# Patient Record
Sex: FEMALE | Race: WHITE | NOT HISPANIC OR LATINO | Employment: OTHER | URBAN - METROPOLITAN AREA
[De-identification: names, ages, dates, MRNs, and addresses within clinical notes are randomized per-mention and may not be internally consistent; named-entity substitution may affect disease eponyms.]

---

## 2023-06-14 ENCOUNTER — HOSPITAL ENCOUNTER (OUTPATIENT)
Dept: DATA CONVERSION | Facility: HOSPITAL | Age: 83
End: 2023-06-14
Attending: ANESTHESIOLOGY | Admitting: ANESTHESIOLOGY
Payer: MEDICARE

## 2023-06-14 DIAGNOSIS — G89.29 OTHER CHRONIC PAIN: ICD-10-CM

## 2023-06-14 DIAGNOSIS — M53.3 SACROCOCCYGEAL DISORDERS, NOT ELSEWHERE CLASSIFIED: ICD-10-CM

## 2023-06-14 DIAGNOSIS — M54.16 RADICULOPATHY, LUMBAR REGION: ICD-10-CM

## 2023-09-07 VITALS — BODY MASS INDEX: 23.59 KG/M2 | HEIGHT: 60 IN | WEIGHT: 120.15 LBS

## 2024-05-21 NOTE — PROGRESS NOTES
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  Patient has no incontinence no saddle paresthesia no paralysis no weakness in her lower extremities.  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 The patient is not interested in any more extensive procedure like Vertiflex or surgery since the injections are helping her. We will plan on bilateral L5-S1 interlaminar LORRAINE soon          Last procedure:   6/14/2023 right L5-S1 interlaminar LORRAINE patient has had 75% improvement in pain and function  9/6/2022 left L5-S1 interlaminar LORRAINE the patient has had a 60% improvement in pain and function  12/13/2021 bilateral L3-5 MBB the patient has had a 30% improvement in pain 30% functional improvement but only for few weeks and did not work until 2 weeks later.  Patient had some back injection at the [facility] in the past        Portions of record reviewed for pertinent issues:  active problem list, medication list, allergies, family history, social history, notes from last encounter, encounters, lab results, imaging and other system records.        I have personally reviewed the OARRS report for this patient. This report is scanned into the electronic medical record. I have considered the risks of abuse, dependence, addiction and diversion. It showed: No chronic controlled substance  OPIOID RISK ASSESSMENT SCORE 0/26  Aberrant behavior: None     Pending law suits: None retired teacher,   Social History: Lives with  who does the cooking, as 4 adult children, 11 grandchildren and 4 great-grandchildren, finished college BA                    Radiological studies:  5/27/2022 bone density done showed normal T score of the lumbar spine with -1.5 in the neck of the femur     5/9/2022 MRI of the lumbar spine showed severe stenosis at the L4-5 may be a candidate for Vertiflex procedure:  FINDINGS:  This report assumes 5 non-rib bearing lumbar vertebral bodies. The  lowest intervertebral disc will be labeled L5-S1.     There is dextrocurvature. There is straightening of the lumbar spine.  There is grade 1 anterolisthesis at L4-L5. Vertebral body heights are  maintained. There is marked intervertebral disc height narrowing at  L2-L3 and L5-S1 and mild to moderate intervertebral disc height  narrowing at T12-L1, L1-L2, L3-L4, and L4-L5 with chronic  degenerative endplate changes. There is slight STIR hyperintense  signal located within some of the endplates, most pronounced at  T12-L1 and L1-L2 which may reflect degenerative osseous edema versus  reactive hypervascularity. Marrow signal is overall within normal  limits.     The conus medullaris terminates at the level of L1-L2 and is  unremarkable in appearance.     At T12-L1, there is a small diffuse disc bulge with osteophytic  spurring and there is a right paracentral disc protrusion with  inferior migration which partially effaces the  ventral thecal sac.  There is no spinal canal stenosis. There is no neural foraminal  narrowing or facet osteoarthropathy.     At L1-L2, there is a small diffuse disc bulge, slightly eccentric to  the right with slight superior migration which along with ligamentum  flavum thickening causes mild-to-moderate spinal canal stenosis.  There is no striking neural foraminal narrowing or facet  osteoarthropathy.     At L2-L3, there are small posterior osteophytes which partially  effaces the ventral thecal sac and along with flavum thickening  causes overall mild-to-moderate spinal canal stenosis, more  pronounced on the left side. There is extension of the disc and  osteophyte into the bilateral neural foramina and there is resulting  mild bilateral neural foraminal narrowing. There is no striking facet  osteoarthropathy.     At L3-L4, there is a small diffuse disc bulge with osteophytic  spurring which along ligamentum flavum thickening causes  mild-to-moderate spinal canal stenosis. There is extension of disc  and osteophyte into the left neural foramen resulting in moderate  neural foraminal narrowing. There is mild extension of the disc and  osteophyte into the right neural foramen causing at most mild right  foraminal narrowing. There is moderate bilateral facet  osteoarthropathy.     At L4-L5, there is a diffuse disc bulge which along with grade 1  anterolisthesis, ligamentum flavum thickening, and marked facet  osteoarthropathy causes marked spinal canal stenosis. There is  extension of disc into the bilateral neural foramina and along with  facet arthropathy causes moderate to marked left and moderate right  neural foraminal narrowing.     At L5-S1, there is a small diffuse disc bulge with osteophytic  spurring. There is no spinal canal stenosis. Disc and osteophyte  extends into the right neural foramen and causes marked foraminal  narrowing. There is no striking narrowing of the left neural foramen.  There is  marked right facet osteoarthropathy.     IMPRESSION:  Multilevel degenerative changes of the lumbar spine including marked  spinal canal stenosis at L4-L5.     This study was interpreted at [facility].            11/19/2021 lumbar x-ray with flexion extension showed:  Lumbar x-ray with flexion-extension showed severe spondylosis and desiccated disc spaces in the home lumbar spine with flexion-extension     CT abdomen and pelvis in 2013 which I reviewed and shows multiple area of spinal stenosis with desiccated disc space in the lower lumbar               Review of Systems   HENT: Negative.     Eyes: Negative.    Respiratory: Negative.     Cardiovascular: Negative.    Gastrointestinal: Negative.    Endocrine: Negative.    Genitourinary: Negative.    Musculoskeletal:  Positive for back pain and myalgias.   Skin: Negative.    Neurological: Negative.    Hematological: Negative.    Psychiatric/Behavioral: Negative.          Physical Exam  Vitals and nursing note reviewed.   Constitutional:       Appearance: Normal appearance.       HENT:      Head: Normocephalic and atraumatic.      Nose: Nose normal.   Eyes:      Extraocular Movements: Extraocular movements intact.      Conjunctiva/sclera: Conjunctivae normal.      Pupils: Pupils are equal, round, and reactive to light.   Cardiovascular:      Rate and Rhythm: Normal rate and regular rhythm.      Pulses: Normal pulses.      Heart sounds: Normal heart sounds.   Pulmonary:      Effort: Pulmonary effort is normal.      Breath sounds: Normal breath sounds.   Abdominal:      General: Abdomen is flat. Bowel sounds are normal.      Palpations: Abdomen is soft.   Musculoskeletal:         General: Tenderness present.   Skin:     General: Skin is warm.   Neurological:      General: No focal deficit present.      Mental Status: She is alert.   Psychiatric:         Mood and Affect: Mood normal.         Behavior: Behavior normal.                     Plan  At least 50% of the visit was involved in the discussion of the options for treatment. We discussed exercises, medication, interventional therapies and surgery. Healthy life style is essential with patient hard work to achieve the wellness. In addition; discussion with the patient and/or family about any of the diagnostic results, impressions and/or recommended diagnostic studies, prognosis, risks and benefits of treatment options, instructions for treatment and/or follow-up, importance of compliance with chosen treatment options, risk-factor reduction, and patient/family education.         Pool therapy, walking in the pool, at least 3x per week for 30 minutes  Continue self-directed physical therapy  Repeat bilateral L5-S1 interlaminar LORRAINE to treat L4-5 stenosis after lorazepam 1 mg  Start a trial of tramadol 37.5 mg to take with acetaminophen 500 mg 3 times daily as needed  Healthy lifestyle and anti-inflammatory diet in addition to weight control discussed with the patient  Alternative chronic pain therapies was discussed, encouraged and information was handed  Return to Clinic 3 months     *Please note this report has been produced using speech recognition software and may contain errors related to that system including grammar, punctuation and spelling as well as words and phrases that may be inappropriate. If there are questions or concerns, please feel free to contact me to clarify.    Marlon Morris MD

## 2024-05-22 ENCOUNTER — OFFICE VISIT (OUTPATIENT)
Dept: PAIN MEDICINE | Facility: CLINIC | Age: 84
End: 2024-05-22
Payer: MEDICARE

## 2024-05-22 VITALS
RESPIRATION RATE: 18 BRPM | WEIGHT: 120 LBS | DIASTOLIC BLOOD PRESSURE: 65 MMHG | TEMPERATURE: 98 F | BODY MASS INDEX: 23.56 KG/M2 | SYSTOLIC BLOOD PRESSURE: 144 MMHG | OXYGEN SATURATION: 100 % | HEIGHT: 60 IN | HEART RATE: 69 BPM

## 2024-05-22 DIAGNOSIS — M54.16 SPINAL STENOSIS OF LUMBAR REGION WITH RADICULOPATHY: Primary | ICD-10-CM

## 2024-05-22 DIAGNOSIS — N18.30 STAGE 3 CHRONIC KIDNEY DISEASE, UNSPECIFIED WHETHER STAGE 3A OR 3B CKD (MULTI): ICD-10-CM

## 2024-05-22 DIAGNOSIS — M48.061 SPINAL STENOSIS OF LUMBAR REGION WITH RADICULOPATHY: Primary | ICD-10-CM

## 2024-05-22 PROCEDURE — 99214 OFFICE O/P EST MOD 30 MIN: CPT | Performed by: ANESTHESIOLOGY

## 2024-05-22 PROCEDURE — 1125F AMNT PAIN NOTED PAIN PRSNT: CPT | Performed by: ANESTHESIOLOGY

## 2024-05-22 PROCEDURE — 1036F TOBACCO NON-USER: CPT | Performed by: ANESTHESIOLOGY

## 2024-05-22 PROCEDURE — 1157F ADVNC CARE PLAN IN RCRD: CPT | Performed by: ANESTHESIOLOGY

## 2024-05-22 PROCEDURE — 1159F MED LIST DOCD IN RCRD: CPT | Performed by: ANESTHESIOLOGY

## 2024-05-22 RX ORDER — LOSARTAN POTASSIUM 50 MG/1
1 TABLET ORAL DAILY
COMMUNITY

## 2024-05-22 RX ORDER — FUROSEMIDE 40 MG/1
1 TABLET ORAL DAILY
COMMUNITY
Start: 2022-07-19 | End: 2024-05-31 | Stop reason: WASHOUT

## 2024-05-22 RX ORDER — TRAMADOL HYDROCHLORIDE AND ACETAMINOPHEN 37.5; 325 MG/1; MG/1
1 TABLET, FILM COATED ORAL EVERY 8 HOURS PRN
Qty: 21 TABLET | Refills: 0 | Status: SHIPPED | OUTPATIENT
Start: 2024-05-22

## 2024-05-22 RX ORDER — LORAZEPAM 1 MG/1
TABLET ORAL
Qty: 1 TABLET | Refills: 2 | Status: SHIPPED | OUTPATIENT
Start: 2024-05-22 | End: 2024-05-31 | Stop reason: WASHOUT

## 2024-05-22 RX ORDER — LANOLIN ALCOHOL/MO/W.PET/CERES
400 CREAM (GRAM) TOPICAL 2 TIMES DAILY
COMMUNITY
Start: 2023-07-20 | End: 2024-05-31 | Stop reason: WASHOUT

## 2024-05-22 RX ORDER — ACETAMINOPHEN 500 MG
TABLET ORAL
COMMUNITY

## 2024-05-22 RX ORDER — MULTIVIT-MIN/IRON/FOLIC ACID/K 45-800-120
CAPSULE ORAL
COMMUNITY

## 2024-05-22 RX ORDER — CARVEDILOL 6.25 MG/1
TABLET ORAL EVERY 12 HOURS
COMMUNITY
Start: 2022-07-28

## 2024-05-22 RX ORDER — LORAZEPAM 1 MG/1
TABLET ORAL
COMMUNITY
Start: 2022-08-24 | End: 2024-05-31 | Stop reason: WASHOUT

## 2024-05-22 RX ORDER — THYROID 90 MG/1
90 TABLET ORAL
COMMUNITY

## 2024-05-22 SDOH — ECONOMIC STABILITY: FOOD INSECURITY: WITHIN THE PAST 12 MONTHS, THE FOOD YOU BOUGHT JUST DIDN'T LAST AND YOU DIDN'T HAVE MONEY TO GET MORE.: NEVER TRUE

## 2024-05-22 SDOH — ECONOMIC STABILITY: FOOD INSECURITY: WITHIN THE PAST 12 MONTHS, YOU WORRIED THAT YOUR FOOD WOULD RUN OUT BEFORE YOU GOT MONEY TO BUY MORE.: NEVER TRUE

## 2024-05-22 ASSESSMENT — LIFESTYLE VARIABLES
AUDIT TOTAL SCORE: 1
TOTAL SCORE: 0
AUDIT-C TOTAL SCORE: 1
HOW OFTEN DO YOU HAVE A DRINK CONTAINING ALCOHOL: MONTHLY OR LESS
HOW OFTEN DURING THE LAST YEAR HAVE YOU BEEN UNABLE TO REMEMBER WHAT HAPPENED THE NIGHT BEFORE BECAUSE YOU HAD BEEN DRINKING: NEVER
HOW OFTEN DURING THE LAST YEAR HAVE YOU HAD A FEELING OF GUILT OR REMORSE AFTER DRINKING: NEVER
HOW OFTEN DURING THE LAST YEAR HAVE YOU FAILED TO DO WHAT WAS NORMALLY EXPECTED FROM YOU BECAUSE OF DRINKING: NEVER
HOW MANY STANDARD DRINKS CONTAINING ALCOHOL DO YOU HAVE ON A TYPICAL DAY: 1 OR 2
SKIP TO QUESTIONS 9-10: 1
HOW OFTEN DO YOU HAVE SIX OR MORE DRINKS ON ONE OCCASION: NEVER
HAS A RELATIVE, FRIEND, DOCTOR, OR ANOTHER HEALTH PROFESSIONAL EXPRESSED CONCERN ABOUT YOUR DRINKING OR SUGGESTED YOU CUT DOWN: NO
HAVE YOU OR SOMEONE ELSE BEEN INJURED AS A RESULT OF YOUR DRINKING: NO
HOW OFTEN DURING THE LAST YEAR HAVE YOU FOUND THAT YOU WERE NOT ABLE TO STOP DRINKING ONCE YOU HAD STARTED: NEVER
HOW OFTEN DURING THE LAST YEAR HAVE YOU NEEDED AN ALCOHOLIC DRINK FIRST THING IN THE MORNING TO GET YOURSELF GOING AFTER A NIGHT OF HEAVY DRINKING: NEVER

## 2024-05-22 ASSESSMENT — ENCOUNTER SYMPTOMS
MYALGIAS: 1
EYES NEGATIVE: 1
DEPRESSION: 0
GASTROINTESTINAL NEGATIVE: 1
PSYCHIATRIC NEGATIVE: 1
ENDOCRINE NEGATIVE: 1
BACK PAIN: 1
NEUROLOGICAL NEGATIVE: 1
LOSS OF SENSATION IN FEET: 0
RESPIRATORY NEGATIVE: 1
HEMATOLOGIC/LYMPHATIC NEGATIVE: 1
OCCASIONAL FEELINGS OF UNSTEADINESS: 0
CARDIOVASCULAR NEGATIVE: 1

## 2024-05-22 ASSESSMENT — PATIENT HEALTH QUESTIONNAIRE - PHQ9
SUM OF ALL RESPONSES TO PHQ9 QUESTIONS 1 AND 2: 0
1. LITTLE INTEREST OR PLEASURE IN DOING THINGS: NOT AT ALL
2. FEELING DOWN, DEPRESSED OR HOPELESS: NOT AT ALL

## 2024-05-22 ASSESSMENT — COLUMBIA-SUICIDE SEVERITY RATING SCALE - C-SSRS
6. HAVE YOU EVER DONE ANYTHING, STARTED TO DO ANYTHING, OR PREPARED TO DO ANYTHING TO END YOUR LIFE?: NO
2. HAVE YOU ACTUALLY HAD ANY THOUGHTS OF KILLING YOURSELF?: NO
1. IN THE PAST MONTH, HAVE YOU WISHED YOU WERE DEAD OR WISHED YOU COULD GO TO SLEEP AND NOT WAKE UP?: NO

## 2024-05-22 ASSESSMENT — PAIN SCALES - GENERAL: PAINLEVEL: 6

## 2024-05-22 NOTE — H&P (VIEW-ONLY)
This is 83 y.o.  female with who has been treated for Lower back pain. Pain was 75 %  better, lasting about six months. The pain is back ,the pain is described as achiness and sharp and is relieved by Lying down with who is here for follow-up   Chief Complaint   Patient presents with    Follow-up     6/14/2023 right L5-S1 interlaminar LORRAINE,  Would like another injection .     Pain is worst with walking or doing any physical activity .  Pain Therapies: Injections        Santana Each Box that Applies Female Male   FAMILY HISTORY OF SUBSTANCE ABUSE  Santana the boxes that applies   Alcohol ?  1    ? 3   Illegal drugs ?  2 ? 3   Rx drugs ?  4 ? 4   PERSONAL HISTORY OF SUBSTNACE ABUSE   Alcohol ?  3 ?  3   Illegal drugs ?  4 ?  4    Rx drugs ?  5 ?  5   Age Between 16-45 years ?  1 ?  1   History of Preadolescent Sexual Abuse ?  3 ?  0   PSYCHOLOGIC DISEASE   ADD, OCD, bipolar, schizophrenia ?  2 ?  2   Depression ?  1 ?  1   Scoring Totals 0      Scoring (Risk)  0-3 - Low  4-7 - Moderate  8 - High    Opioid Risk Assessment Score 0/26

## 2024-05-30 PROBLEM — I10 ESSENTIAL HYPERTENSION: Status: ACTIVE | Noted: 2021-01-05

## 2024-05-30 PROBLEM — M54.16 LUMBAR RADICULOPATHY: Status: ACTIVE | Noted: 2024-05-30

## 2024-05-30 PROBLEM — N39.41 URGE INCONTINENCE: Status: ACTIVE | Noted: 2017-06-14

## 2024-05-30 PROBLEM — N18.32 CHRONIC KIDNEY DISEASE (CKD) STAGE G3B/A2, MODERATELY DECREASED GLOMERULAR FILTRATION RATE (GFR) BETWEEN 30-44 ML/MIN/1.73 SQUARE METER AND ALBUMINURIA CREATININE RATIO BETWEEN 30-299 MG/G (C* (MULTI): Status: ACTIVE | Noted: 2020-07-24

## 2024-05-30 PROBLEM — E89.0 POST-SURGICAL HYPOTHYROIDISM: Status: ACTIVE | Noted: 2023-07-17

## 2024-05-30 PROBLEM — Z85.29 HISTORY OF MALIGNANT NEOPLASM OF THORACIC CAVITY STRUCTURE: Status: ACTIVE | Noted: 2024-05-30

## 2024-05-30 PROBLEM — I34.0 NONRHEUMATIC MITRAL VALVE REGURGITATION: Status: ACTIVE | Noted: 2022-07-28

## 2024-05-30 PROBLEM — Z85.038 HISTORY OF MALIGNANT NEOPLASM OF COLON: Status: ACTIVE | Noted: 2024-05-30

## 2024-05-30 PROBLEM — H90.3 SENSORY HEARING LOSS, BILATERAL: Status: ACTIVE | Noted: 2019-06-12

## 2024-05-31 ENCOUNTER — OFFICE VISIT (OUTPATIENT)
Dept: CARDIOLOGY | Facility: CLINIC | Age: 84
End: 2024-05-31
Payer: MEDICARE

## 2024-05-31 VITALS
BODY MASS INDEX: 20.9 KG/M2 | DIASTOLIC BLOOD PRESSURE: 62 MMHG | SYSTOLIC BLOOD PRESSURE: 144 MMHG | OXYGEN SATURATION: 100 % | WEIGHT: 107 LBS | HEART RATE: 81 BPM

## 2024-05-31 DIAGNOSIS — I34.0 NONRHEUMATIC MITRAL VALVE REGURGITATION: ICD-10-CM

## 2024-05-31 DIAGNOSIS — I10 ESSENTIAL HYPERTENSION: ICD-10-CM

## 2024-05-31 DIAGNOSIS — R06.02 SHORTNESS OF BREATH: ICD-10-CM

## 2024-05-31 DIAGNOSIS — N18.32 CHRONIC KIDNEY DISEASE (CKD) STAGE G3B/A2, MODERATELY DECREASED GLOMERULAR FILTRATION RATE (GFR) BETWEEN 30-44 ML/MIN/1.73 SQUARE METER AND ALBUMINURIA CREATININE RATIO BETWEEN 30-299 MG/G (C* (MULTI): ICD-10-CM

## 2024-05-31 DIAGNOSIS — I50.32 CHRONIC DIASTOLIC HEART FAILURE (MULTI): Primary | ICD-10-CM

## 2024-05-31 PROCEDURE — 3078F DIAST BP <80 MM HG: CPT | Performed by: STUDENT IN AN ORGANIZED HEALTH CARE EDUCATION/TRAINING PROGRAM

## 2024-05-31 PROCEDURE — 1159F MED LIST DOCD IN RCRD: CPT | Performed by: STUDENT IN AN ORGANIZED HEALTH CARE EDUCATION/TRAINING PROGRAM

## 2024-05-31 PROCEDURE — 1160F RVW MEDS BY RX/DR IN RCRD: CPT | Performed by: STUDENT IN AN ORGANIZED HEALTH CARE EDUCATION/TRAINING PROGRAM

## 2024-05-31 PROCEDURE — 3077F SYST BP >= 140 MM HG: CPT | Performed by: STUDENT IN AN ORGANIZED HEALTH CARE EDUCATION/TRAINING PROGRAM

## 2024-05-31 PROCEDURE — 1157F ADVNC CARE PLAN IN RCRD: CPT | Performed by: STUDENT IN AN ORGANIZED HEALTH CARE EDUCATION/TRAINING PROGRAM

## 2024-05-31 PROCEDURE — 99204 OFFICE O/P NEW MOD 45 MIN: CPT | Performed by: STUDENT IN AN ORGANIZED HEALTH CARE EDUCATION/TRAINING PROGRAM

## 2024-05-31 RX ORDER — TORSEMIDE 20 MG/1
20 TABLET ORAL DAILY
COMMUNITY

## 2024-05-31 NOTE — PROGRESS NOTES
Cardiology New  Outpatient Visit    Reason for referral: chronic diastolic heart failure; mitral regurgitation     HPI: Xin Fajardo is a 83 y.o.  female who presents today for chronic diastolic heart failure, hx mitral regurgitation. Past medical history of chronic diastolic heart failure, mitral regurgitation (Moderate per TTE ), primary hypertension, dyslipidemia, hypothyroidism, Stage IIIb CKD, osteoporosis, hx colon + lung cancer, hx thyroid cancer.     Xin presented to cardiology clinic on 2024.  Patient notes worsening dyspnea over the past year.  No recent hospitalizations for acute on chronic heart failure. History of 5-FU for colon cancer in remote past.  Patient with a history of moderate MR per transthoracic echocardiogram .  Recommended further evaluation with repeat TTE and checking BNP.     Past Medical History:   - As above    Surgical History:   She has a past surgical history that includes Other surgical history (2021); Other surgical history (2021); Other surgical history (2021); Other surgical history (2021); US guided percutaneous peritoneal or retroperitoneal fluid collection drainage (2013); and US guided abscess drain (2013).    Family History:   Family History   Problem Relation Name Age of Onset    Heart disease Mother      Heart attack Father  56         from MI    Coronary artery disease Father      Breast cancer Sister         Allergies:  Amlodipine     Social History:   - Non smoker (quit in remote past- quit ); no illicit drug use    Prior Cardiovascular Testing (personally reviewed):     TTE ()- moderate MR    Review of Systems:  Review of Systems   Constitutional: Negative.   HENT: Negative.     Eyes: Negative.    Cardiovascular:  Positive for dyspnea on exertion. Negative for chest pain, near-syncope, orthopnea, palpitations, paroxysmal nocturnal dyspnea and syncope.   Respiratory:  Positive for  shortness of breath.    Endocrine: Negative.    Hematologic/Lymphatic: Negative.    Skin: Negative.    Musculoskeletal: Negative.    Gastrointestinal: Negative.    Genitourinary: Negative.    Neurological: Negative.    Psychiatric/Behavioral: Negative.     Allergic/Immunologic: Negative.        Objective     Outpatient Medications:    Current Outpatient Medications:     carvedilol (Coreg) 6.25 mg tablet, Take by mouth every 12 hours., Disp: , Rfl:     cholecalciferol (Vitamin D-3) 50 mcg (2,000 unit) capsule, Take by mouth., Disp: , Rfl:     losartan (Cozaar) 50 mg tablet, Take 1 tablet (50 mg) by mouth once daily., Disp: , Rfl:     multivitamin-min-iron-FA-vit K (Bariatric Multivitamins) 45 mg iron- 800 mcg-120 mcg capsule, Take by mouth., Disp: , Rfl:     thyroid, pork, ([place] Thyroid) 90 mg tablet, Take 1 tablet (90 mg) by mouth once daily in the morning. Take before meals., Disp: , Rfl:     traMADoL-acetaminophen (Ultracet) 37.5-325 mg tablet, Take 1 tablet by mouth every 8 hours if needed for severe pain (7 - 10) for up to 21 doses. Take with acetaminophen 500 mg, Disp: 21 tablet, Rfl: 0    denosumab (Prolia) 60 mg/mL syringe, Inject 1 mL (60 mg) under the skin every 6 months., Disp: , Rfl:     torsemide (Demadex) 20 mg tablet, Take 1 tablet (20 mg) by mouth once daily., Disp: , Rfl:     Current Facility-Administered Medications:     perflutren lipid microspheres (Definity) injection 2 mL of dilution, 2 mL of dilution, intravenous, Once in imaging, Idris Wright MD     Last Recorded Vitals  /62 (BP Location: Left arm, Patient Position: Sitting, BP Cuff Size: Adult)   Pulse 81   Wt 48.5 kg (107 lb)   SpO2 100%   BMI 20.90 kg/m²     Physical Exam:  Physical Exam  Constitutional:       General: She is not in acute distress.  HENT:      Head: Normocephalic.      Mouth/Throat:      Mouth: Mucous membranes are moist.   Eyes:      Extraocular Movements: Extraocular movements intact.       Conjunctiva/sclera: Conjunctivae normal.   Neck:      Vascular: No carotid bruit or JVD.   Cardiovascular:      Rate and Rhythm: Normal rate and regular rhythm.      Pulses: Normal pulses.      Heart sounds: No murmur heard.  Pulmonary:      Effort: Pulmonary effort is normal. No respiratory distress.      Breath sounds: Normal breath sounds.   Abdominal:      General: Bowel sounds are normal. There is no distension.      Palpations: Abdomen is soft.   Musculoskeletal:         General: No swelling.   Skin:     General: Skin is warm and dry.   Neurological:      General: No focal deficit present.      Mental Status: She is alert.      Cranial Nerves: No cranial nerve deficit.      Motor: No weakness.   Psychiatric:         Mood and Affect: Mood normal.         Behavior: Behavior normal.         Lab Review:    Lab Results   Component Value Date    GLUCOSE 97 06/04/2024    CALCIUM 9.3 06/04/2024     06/04/2024    K 3.9 06/04/2024    CO2 11 (L) 06/04/2024     (H) 06/04/2024    BUN 43 (H) 06/04/2024    CREATININE 1.48 (H) 06/04/2024       Lab Results   Component Value Date    WBC 9.7 06/07/2024    HGB 10.1 (L) 06/07/2024    HCT 32.5 (L) 06/07/2024     (H) 06/07/2024     06/07/2024       Lab Results   Component Value Date    BNP 72 06/04/2024       Assessment:   83 y.o.  female who presents today for chronic diastolic heart failure, hx mitral regurgitation. Past medical history of chronic diastolic heart failure, mitral regurgitation (Moderate per TTE 2022), primary hypertension, dyslipidemia, hypothyroidism, Stage IIIb CKD, osteoporosis, hx colon + lung cancer, hx thyroid cancer.     Patient with worsening dyspnea that is unrelated to exertion over the past year.  Appears euvolemic on exam.  Recommend checking complete transthoracic echocardiogram and BNP given remote history of moderate mitral regurgitation per TTE in 2022.      Overall Plan:  1.  Dyspnea  - Check complete  "transthoracic echocardiogram  - Check BNP, CBC (dyspnea could be secondary to anemia)   - Will consider stress testing pending clinical course and echocardiogram results    2.  History of moderate mitral regurgitation  - Check complete transthoracic echocardiogram as above    3.  Healthcare maintenance  - Check CBC, CMP    4. Discussed \"red flag\" symptoms that should prompt immediate medical attention; patient verbalized understanding    Disposition: Return to cardiology clinic after above testing    Idris Wright MD        "

## 2024-05-31 NOTE — PATIENT INSTRUCTIONS
For your shortness of breath we will further investigate with a heart ultrasound (echocardiogram) and checking a metabolic panel / BNP lab    We will see you back in heart clinic after your testing.     Please call my nurse Denisse with any questions; her contact information is below.     Thank you for your visit today. Please contact our office (via [employer] or phone) with any additional questions.     [facility] [place]    Denisse, RN/Clinic Nurse for:    Dr. Adriana Blackmon    [address], [address]  [place] [address]    Phone: [phone] Press Option 5 then Option 3 to speak with the Clinic Nurse (Denisse)    _____    To Reach:    Billing Questions -    [phone]  Scheduling / Rescheduling -  Option 1  Refills / Medication Requests -  Option 3  General Office /  -  Option 4  Results -     Option 6  Medical Records -    Option 7  Repeat Options -    Option 9

## 2024-06-04 ENCOUNTER — LAB (OUTPATIENT)
Dept: LAB | Facility: LAB | Age: 84
End: 2024-06-04
Payer: MEDICARE

## 2024-06-04 ENCOUNTER — HOSPITAL ENCOUNTER (OUTPATIENT)
Dept: CARDIOLOGY | Facility: CLINIC | Age: 84
Discharge: HOME | End: 2024-06-04
Payer: MEDICARE

## 2024-06-04 DIAGNOSIS — I50.32 CHRONIC DIASTOLIC HEART FAILURE (MULTI): ICD-10-CM

## 2024-06-04 DIAGNOSIS — N18.32 CHRONIC KIDNEY DISEASE (CKD) STAGE G3B/A2, MODERATELY DECREASED GLOMERULAR FILTRATION RATE (GFR) BETWEEN 30-44 ML/MIN/1.73 SQUARE METER AND ALBUMINURIA CREATININE RATIO BETWEEN 30-299 MG/G (C* (MULTI): ICD-10-CM

## 2024-06-04 DIAGNOSIS — I50.22 CHRONIC SYSTOLIC (CONGESTIVE) HEART FAILURE (MULTI): ICD-10-CM

## 2024-06-04 DIAGNOSIS — R06.02 SHORTNESS OF BREATH: ICD-10-CM

## 2024-06-04 DIAGNOSIS — I34.0 NONRHEUMATIC MITRAL VALVE REGURGITATION: ICD-10-CM

## 2024-06-04 PROCEDURE — 93306 TTE W/DOPPLER COMPLETE: CPT

## 2024-06-04 PROCEDURE — 93306 TTE W/DOPPLER COMPLETE: CPT | Performed by: STUDENT IN AN ORGANIZED HEALTH CARE EDUCATION/TRAINING PROGRAM

## 2024-06-04 PROCEDURE — 36415 COLL VENOUS BLD VENIPUNCTURE: CPT

## 2024-06-04 PROCEDURE — 80053 COMPREHEN METABOLIC PANEL: CPT

## 2024-06-04 PROCEDURE — 83880 ASSAY OF NATRIURETIC PEPTIDE: CPT

## 2024-06-05 LAB
ALBUMIN SERPL BCP-MCNC: 3.3 G/DL (ref 3.4–5)
ALP SERPL-CCNC: 47 U/L (ref 33–136)
ALT SERPL W P-5'-P-CCNC: 12 U/L (ref 7–45)
ANION GAP SERPL CALC-SCNC: 16 MMOL/L (ref 10–20)
AST SERPL W P-5'-P-CCNC: 14 U/L (ref 9–39)
BILIRUB SERPL-MCNC: 0.2 MG/DL (ref 0–1.2)
BNP SERPL-MCNC: 72 PG/ML (ref 0–99)
BUN SERPL-MCNC: 43 MG/DL (ref 6–23)
CALCIUM SERPL-MCNC: 9.3 MG/DL (ref 8.6–10.6)
CHLORIDE SERPL-SCNC: 121 MMOL/L (ref 98–107)
CO2 SERPL-SCNC: 11 MMOL/L (ref 21–32)
CREAT SERPL-MCNC: 1.48 MG/DL (ref 0.5–1.05)
EGFRCR SERPLBLD CKD-EPI 2021: 35 ML/MIN/1.73M*2
GLUCOSE SERPL-MCNC: 97 MG/DL (ref 74–99)
POTASSIUM SERPL-SCNC: 3.9 MMOL/L (ref 3.5–5.3)
PROT SERPL-MCNC: 5.9 G/DL (ref 6.4–8.2)
SODIUM SERPL-SCNC: 144 MMOL/L (ref 136–145)

## 2024-06-06 LAB
EJECTION FRACTION APICAL 4 CHAMBER: 68.6
LEFT ATRIUM VOLUME AREA LENGTH INDEX BSA: 24.1 ML/M2
LEFT VENTRICLE INTERNAL DIMENSION DIASTOLE: 3.64 CM (ref 3.5–6)
LEFT VENTRICULAR OUTFLOW TRACT DIAMETER: 1.6 CM
LV EJECTION FRACTION BIPLANE: 70 %
MITRAL VALVE E/A RATIO: 0.6
MITRAL VALVE E/E' RATIO: 0.96
RIGHT VENTRICLE FREE WALL PEAK S': 13.4 CM/S
RIGHT VENTRICLE PEAK SYSTOLIC PRESSURE: 24.7 MMHG
TRICUSPID ANNULAR PLANE SYSTOLIC EXCURSION: 2.1 CM

## 2024-06-07 ENCOUNTER — LAB (OUTPATIENT)
Dept: LAB | Facility: LAB | Age: 84
End: 2024-06-07
Payer: MEDICARE

## 2024-06-07 ENCOUNTER — OFFICE VISIT (OUTPATIENT)
Dept: CARDIOLOGY | Facility: CLINIC | Age: 84
End: 2024-06-07
Payer: MEDICARE

## 2024-06-07 VITALS
OXYGEN SATURATION: 100 % | DIASTOLIC BLOOD PRESSURE: 90 MMHG | HEART RATE: 76 BPM | BODY MASS INDEX: 20.12 KG/M2 | SYSTOLIC BLOOD PRESSURE: 142 MMHG | WEIGHT: 103 LBS

## 2024-06-07 DIAGNOSIS — I34.0 NONRHEUMATIC MITRAL VALVE REGURGITATION: ICD-10-CM

## 2024-06-07 DIAGNOSIS — I50.32 CHRONIC DIASTOLIC HEART FAILURE (MULTI): ICD-10-CM

## 2024-06-07 DIAGNOSIS — I10 ESSENTIAL HYPERTENSION: ICD-10-CM

## 2024-06-07 DIAGNOSIS — R06.09 DYSPNEA ON EXERTION: ICD-10-CM

## 2024-06-07 DIAGNOSIS — N18.32 CHRONIC KIDNEY DISEASE (CKD) STAGE G3B/A2, MODERATELY DECREASED GLOMERULAR FILTRATION RATE (GFR) BETWEEN 30-44 ML/MIN/1.73 SQUARE METER AND ALBUMINURIA CREATININE RATIO BETWEEN 30-299 MG/G (C* (MULTI): ICD-10-CM

## 2024-06-07 LAB
ERYTHROCYTE [DISTWIDTH] IN BLOOD BY AUTOMATED COUNT: 14.5 % (ref 11.5–14.5)
HCT VFR BLD AUTO: 32.5 % (ref 36–46)
HGB BLD-MCNC: 10.1 G/DL (ref 12–16)
MCH RBC QN AUTO: 32.2 PG (ref 26–34)
MCHC RBC AUTO-ENTMCNC: 31.1 G/DL (ref 32–36)
MCV RBC AUTO: 104 FL (ref 80–100)
NRBC BLD-RTO: 0 /100 WBCS (ref 0–0)
PLATELET # BLD AUTO: 390 X10*3/UL (ref 150–450)
RBC # BLD AUTO: 3.14 X10*6/UL (ref 4–5.2)
WBC # BLD AUTO: 9.7 X10*3/UL (ref 4.4–11.3)

## 2024-06-07 PROCEDURE — 99213 OFFICE O/P EST LOW 20 MIN: CPT | Performed by: STUDENT IN AN ORGANIZED HEALTH CARE EDUCATION/TRAINING PROGRAM

## 2024-06-07 PROCEDURE — 36415 COLL VENOUS BLD VENIPUNCTURE: CPT

## 2024-06-07 PROCEDURE — 93000 ELECTROCARDIOGRAM COMPLETE: CPT | Performed by: STUDENT IN AN ORGANIZED HEALTH CARE EDUCATION/TRAINING PROGRAM

## 2024-06-07 PROCEDURE — 85027 COMPLETE CBC AUTOMATED: CPT

## 2024-06-07 PROCEDURE — 1159F MED LIST DOCD IN RCRD: CPT | Performed by: STUDENT IN AN ORGANIZED HEALTH CARE EDUCATION/TRAINING PROGRAM

## 2024-06-07 PROCEDURE — 3080F DIAST BP >= 90 MM HG: CPT | Performed by: STUDENT IN AN ORGANIZED HEALTH CARE EDUCATION/TRAINING PROGRAM

## 2024-06-07 PROCEDURE — 1157F ADVNC CARE PLAN IN RCRD: CPT | Performed by: STUDENT IN AN ORGANIZED HEALTH CARE EDUCATION/TRAINING PROGRAM

## 2024-06-07 PROCEDURE — 1160F RVW MEDS BY RX/DR IN RCRD: CPT | Performed by: STUDENT IN AN ORGANIZED HEALTH CARE EDUCATION/TRAINING PROGRAM

## 2024-06-07 PROCEDURE — 3077F SYST BP >= 140 MM HG: CPT | Performed by: STUDENT IN AN ORGANIZED HEALTH CARE EDUCATION/TRAINING PROGRAM

## 2024-06-07 RX ORDER — REGADENOSON 0.08 MG/ML
0.4 INJECTION, SOLUTION INTRAVENOUS
OUTPATIENT
Start: 2024-06-07

## 2024-06-07 NOTE — PATIENT INSTRUCTIONS
For your shortness of breath we will further evaluate with a heart stress test (pharmacologic NM stress test) and check a blood count.     For your fatigue we will also check a blood count.     We recommend that you see your PCP given recent unexplained weight loss and fatigue.     We will see you back in heart clinic after your testing.       Thank you for your visit today. Please contact our office (via [employer] or phone) with any additional questions.     [facility] [place]    Denisse, MARISSA/Clinic Nurse for:    Dr. Adriana Blackmon    [address], [address]  [place] [address]    Phone: [phone] Press Option 5 then Option 3 to speak with the Clinic Nurse (Denisse)    _____    To Reach:    Billing Questions -    [phone]  Scheduling / Rescheduling -  Option 1  Refills / Medication Requests -  Option 3  General Office /  -  Option 4  Results -     Option 6  Medical Records -    Option 7  Repeat Options -    Option 9

## 2024-06-07 NOTE — PROGRESS NOTES
Cardiology Established Outpatient Visit    Reason for visit: chronic diastolic heart failure; mitral regurgitation     HPI: Xin Fajardo is a 83 y.o.  female who presents today for chronic diastolic heart failure; mitral regurgitation . Past medical history of chronic diastolic heart failure, mitral regurgitation (Moderate per TTE ), primary hypertension, dyslipidemia, hypothyroidism, Stage IIIb CKD, osteoporosis, hx colon + lung cancer, hx thyroid cancer.     Xin presented to cardiology clinic on 2024.  Patient notes worsening dyspnea over the past year.  No recent hospitalizations for acute on chronic heart failure. History of 5-FU for colon cancer in remote past.  Patient with a history of moderate MR per transthoracic echocardiogram . Recommended further evaluation with repeat TTE and checking BNP.     Xin presented to cardiology clinic on 2024 for a follow-up visit.  Dyspnea unchanged. Dyspnea at rest and worse with exertion.  BNP was un-elevated. TTE showed normal LV size and systolic function; trace to mild MR.  Given exertional dyspnea (anginal equivalent), HTN, DLD, and family history of ASHD (father-  of MI age 56) > recommend further evaluation with NM stress.      Past Medical History:   - As above    Surgical History:   She has a past surgical history that includes Other surgical history (2021); Other surgical history (2021); Other surgical history (2021); Other surgical history (2021); US guided percutaneous peritoneal or retroperitoneal fluid collection drainage (2013); and US guided abscess drain (2013).    Family History:   Family History   Problem Relation Name Age of Onset    Heart disease Mother      Heart attack Father  56         from MI    Coronary artery disease Father      Breast cancer Sister         Allergies:  Amlodipine     Social History:   - Non smoker (quit in remote past- quit ); no illicit drug  use    Prior Cardiovascular Testing (personally reviewed):     ECG 6/7/2024)- Normal sinus rhythm; normal ECG     TTE (6/4/2024)  1. Left ventricular systolic function is normal with a 65-70% estimated ejection fraction.   2. Spectral Doppler shows an impaired relaxation pattern of left ventricular diastolic filling.   3. RVSP within normal limits.   4. Trace to mild mitral regurgitation    TTE (2022)- moderate MR     Review of Systems:  Review of Systems   Constitutional: Negative.   HENT: Negative.     Eyes: Negative.    Cardiovascular:  Positive for dyspnea on exertion. Negative for chest pain, near-syncope, orthopnea, palpitations, paroxysmal nocturnal dyspnea and syncope.   Respiratory:  Positive for shortness of breath.    Endocrine: Negative.    Hematologic/Lymphatic: Negative.    Skin: Negative.    Musculoskeletal: Negative.    Gastrointestinal: Negative.    Genitourinary: Negative.    Neurological: Negative.    Psychiatric/Behavioral: Negative.     Allergic/Immunologic: Negative.        Objective     Outpatient Medications:    Current Outpatient Medications:     carvedilol (Coreg) 6.25 mg tablet, Take by mouth every 12 hours., Disp: , Rfl:     cholecalciferol (Vitamin D-3) 50 mcg (2,000 unit) capsule, Take by mouth., Disp: , Rfl:     losartan (Cozaar) 50 mg tablet, Take 1 tablet (50 mg) by mouth once daily., Disp: , Rfl:     multivitamin-min-iron-FA-vit K (Bariatric Multivitamins) 45 mg iron- 800 mcg-120 mcg capsule, Take by mouth., Disp: , Rfl:     thyroid, pork, ([place] Thyroid) 90 mg tablet, Take 1 tablet (90 mg) by mouth once daily in the morning. Take before meals., Disp: , Rfl:     torsemide (Demadex) 20 mg tablet, Take 1 tablet (20 mg) by mouth once daily., Disp: , Rfl:     traMADoL-acetaminophen (Ultracet) 37.5-325 mg tablet, Take 1 tablet by mouth every 8 hours if needed for severe pain (7 - 10) for up to 21 doses. Take with acetaminophen 500 mg (Patient not taking: Reported on 6/7/2024), Disp: 21  tablet, Rfl: 0    Current Facility-Administered Medications:     perflutren lipid microspheres (Definity) injection 2 mL of dilution, 2 mL of dilution, intravenous, Once in imaging, Idris Wright MD     Last Recorded Vitals  /90 (BP Location: Right arm, Patient Position: Sitting, BP Cuff Size: Adult)   Pulse 76   Wt 46.7 kg (103 lb)   SpO2 100%   BMI 20.12 kg/m²     Physical Exam:  Physical Exam  Constitutional:       General: She is not in acute distress.  HENT:      Head: Normocephalic.      Mouth/Throat:      Mouth: Mucous membranes are moist.   Eyes:      Extraocular Movements: Extraocular movements intact.      Conjunctiva/sclera: Conjunctivae normal.   Neck:      Vascular: No carotid bruit or JVD.   Cardiovascular:      Rate and Rhythm: Normal rate and regular rhythm.      Pulses: Normal pulses.      Heart sounds: No murmur heard.  Pulmonary:      Effort: Pulmonary effort is normal. No respiratory distress.      Breath sounds: Normal breath sounds.   Abdominal:      General: Bowel sounds are normal. There is no distension.      Palpations: Abdomen is soft.   Musculoskeletal:         General: No swelling.   Skin:     General: Skin is warm and dry.   Neurological:      General: No focal deficit present.      Mental Status: She is alert.      Cranial Nerves: No cranial nerve deficit.      Motor: No weakness.   Psychiatric:         Mood and Affect: Mood normal.         Behavior: Behavior normal.         Lab Review:    Lab Results   Component Value Date    GLUCOSE 97 06/04/2024    CALCIUM 9.3 06/04/2024     06/04/2024    K 3.9 06/04/2024    CO2 11 (L) 06/04/2024     (H) 06/04/2024    BUN 43 (H) 06/04/2024    CREATININE 1.48 (H) 06/04/2024       Lab Results   Component Value Date    BNP 72 06/04/2024       Assessment:   83 y.o.  female who presents today for chronic diastolic heart failure; mitral regurgitation . Past medical history of chronic diastolic heart failure, mitral  "regurgitation (Moderate per TTE ), primary hypertension, dyslipidemia, hypothyroidism, Stage IIIb CKD, osteoporosis, hx colon + lung cancer, hx thyroid cancer.     Xin presented to cardiology clinic on 2024 for a follow-up visit.  Dyspnea unchanged. Dyspnea at rest and worse with exertion.  BNP was un-elevated. TTE showed normal LV size and systolic function; trace to mild MR.  Given exertional dyspnea (anginal equivalent), HTN, DLD, and family history of ASHD (father-  of MI age 56) > recommend further evaluation with NM stress and check a CBC to assess for anemia (non-cardiac cause for dyspnea).     Overall Plan:  1. Chronic dyspnea; worse with exertion; cardiac risk factors as above  - check NM stress as above  - check CBC > follow-up with PCP if lab test show anemia    2. Hx mitral regurgitation  - trace to mild on recent TTE > unlikely to explain patient's dyspnea  - monitor clinically    3. Primary hypertension  - goal BP < 130/90 mmHg  - continue losartan, carvedilol    4. Stage IIIb CKD  - avoid NSAIDs  - renally dose medications    5. Discussed \"red flag\" symptoms that should prompt immediate medical attention; patient verbalized understanding    Disposition: Return to cardiology clinic after above testing    Idris Wright MD        "

## 2024-06-10 ENCOUNTER — TELEPHONE (OUTPATIENT)
Dept: CARDIOLOGY | Facility: CLINIC | Age: 84
End: 2024-06-10
Payer: MEDICARE

## 2024-06-10 ENCOUNTER — HOSPITAL ENCOUNTER (OUTPATIENT)
Dept: PAIN MEDICINE | Facility: CLINIC | Age: 84
Discharge: HOME | End: 2024-06-10
Payer: MEDICARE

## 2024-06-10 ENCOUNTER — HOSPITAL ENCOUNTER (OUTPATIENT)
Dept: RADIOLOGY | Facility: CLINIC | Age: 84
Discharge: HOME | End: 2024-06-10
Payer: MEDICARE

## 2024-06-10 VITALS
RESPIRATION RATE: 16 BRPM | OXYGEN SATURATION: 94 % | SYSTOLIC BLOOD PRESSURE: 167 MMHG | DIASTOLIC BLOOD PRESSURE: 75 MMHG | TEMPERATURE: 97.3 F | HEART RATE: 76 BPM

## 2024-06-10 DIAGNOSIS — M48.061 SPINAL STENOSIS OF LUMBAR REGION WITH RADICULOPATHY: ICD-10-CM

## 2024-06-10 DIAGNOSIS — M54.16 SPINAL STENOSIS OF LUMBAR REGION WITH RADICULOPATHY: ICD-10-CM

## 2024-06-10 PROCEDURE — 62323 NJX INTERLAMINAR LMBR/SAC: CPT | Performed by: ANESTHESIOLOGY

## 2024-06-10 RX ORDER — METHYLPREDNISOLONE ACETATE 80 MG/ML
INJECTION, SUSPENSION INTRA-ARTICULAR; INTRALESIONAL; INTRAMUSCULAR; SOFT TISSUE
Status: DISCONTINUED
Start: 2024-06-10 | End: 2024-06-11 | Stop reason: HOSPADM

## 2024-06-10 RX ORDER — SODIUM CHLORIDE 9 MG/ML
INJECTION, SOLUTION INTRAMUSCULAR; INTRAVENOUS; SUBCUTANEOUS
Status: DISCONTINUED
Start: 2024-06-10 | End: 2024-06-11 | Stop reason: HOSPADM

## 2024-06-10 ASSESSMENT — PAIN - FUNCTIONAL ASSESSMENT: PAIN_FUNCTIONAL_ASSESSMENT: 0-10

## 2024-06-10 ASSESSMENT — PAIN SCALES - GENERAL
PAINLEVEL_OUTOF10: 6
PAINLEVEL_OUTOF10: 6

## 2024-06-10 ASSESSMENT — ENCOUNTER SYMPTOMS
DEPRESSION: 0
OCCASIONAL FEELINGS OF UNSTEADINESS: 1
LOSS OF SENSATION IN FEET: 0

## 2024-06-10 NOTE — Clinical Note
Patient tolerated the procedure well and is comfortable with no complaints of pain. Vital signs stable.

## 2024-06-10 NOTE — Clinical Note
Patient in room , Patient identified . Universal protocal , and preprocedure check list ,patient positioned prone  , site prepped mid to lower back .

## 2024-06-10 NOTE — TELEPHONE ENCOUNTER
Left detailed message for patient to contact PCP based upon blood work results and anemia that is shows.  Please call us with any questions.

## 2024-06-10 NOTE — TELEPHONE ENCOUNTER
----- Message from Idris Wright MD sent at 6/9/2024  9:39 AM EDT -----  Please let patient know that her recent lab test showed anemia (low red blood cell count) which could impart explain patient's fatigue.  Recommend follow-up with her primary care physician for further evaluation and treatment.  ----- Message -----  From: Lab, Background User  Sent: 6/7/2024   1:41 PM EDT  To: Idris Wright MD

## 2024-06-10 NOTE — Clinical Note
Patient tolerated the procedure well and is comfortable with no complaints of pain. Bandage applied  Vital signs stable. Arousable prior to transport. Patient transported to PACU via wheelchair . Handoff completed.

## 2024-06-14 ENCOUNTER — HOSPITAL ENCOUNTER (OUTPATIENT)
Dept: RADIOLOGY | Facility: CLINIC | Age: 84
Discharge: HOME | End: 2024-06-14
Payer: MEDICARE

## 2024-06-14 ENCOUNTER — HOSPITAL ENCOUNTER (OUTPATIENT)
Dept: CARDIOLOGY | Facility: CLINIC | Age: 84
Discharge: HOME | End: 2024-06-14
Payer: MEDICARE

## 2024-06-14 DIAGNOSIS — I10 ESSENTIAL HYPERTENSION: Primary | ICD-10-CM

## 2024-06-14 DIAGNOSIS — R06.09 DYSPNEA ON EXERTION: ICD-10-CM

## 2024-06-14 DIAGNOSIS — R06.02 SHORTNESS OF BREATH: Primary | ICD-10-CM

## 2024-06-14 DIAGNOSIS — R06.02 SHORTNESS OF BREATH: ICD-10-CM

## 2024-06-14 PROCEDURE — 93017 CV STRESS TEST TRACING ONLY: CPT

## 2024-06-14 PROCEDURE — 93018 CV STRESS TEST I&R ONLY: CPT | Performed by: STUDENT IN AN ORGANIZED HEALTH CARE EDUCATION/TRAINING PROGRAM

## 2024-06-14 PROCEDURE — 2500000004 HC RX 250 GENERAL PHARMACY W/ HCPCS (ALT 636 FOR OP/ED): Performed by: STUDENT IN AN ORGANIZED HEALTH CARE EDUCATION/TRAINING PROGRAM

## 2024-06-14 PROCEDURE — 93016 CV STRESS TEST SUPVJ ONLY: CPT | Performed by: STUDENT IN AN ORGANIZED HEALTH CARE EDUCATION/TRAINING PROGRAM

## 2024-06-14 PROCEDURE — 78452 HT MUSCLE IMAGE SPECT MULT: CPT

## 2024-06-14 RX ORDER — REGADENOSON 0.08 MG/ML
0.4 INJECTION, SOLUTION INTRAVENOUS
Status: COMPLETED | OUTPATIENT
Start: 2024-06-14 | End: 2024-06-14

## 2024-06-19 DIAGNOSIS — R06.02 SHORTNESS OF BREATH: ICD-10-CM

## 2024-06-19 DIAGNOSIS — Z85.29 HISTORY OF MALIGNANT NEOPLASM OF THORACIC CAVITY STRUCTURE: ICD-10-CM

## 2024-06-20 ENCOUNTER — HOSPITAL ENCOUNTER (OUTPATIENT)
Dept: RADIOLOGY | Facility: CLINIC | Age: 84
Discharge: HOME | End: 2024-06-20
Payer: MEDICARE

## 2024-06-20 DIAGNOSIS — C18.2 MALIGNANT NEOPLASM OF ASCENDING COLON (MULTI): ICD-10-CM

## 2024-06-20 DIAGNOSIS — R06.02 SHORTNESS OF BREATH: ICD-10-CM

## 2024-06-20 DIAGNOSIS — Z85.038 HISTORY OF MALIGNANT NEOPLASM OF COLON: Primary | ICD-10-CM

## 2024-06-20 DIAGNOSIS — R91.8 LUNG NODULES: ICD-10-CM

## 2024-06-20 DIAGNOSIS — Z85.29 HISTORY OF MALIGNANT NEOPLASM OF THORACIC CAVITY STRUCTURE: ICD-10-CM

## 2024-06-20 PROCEDURE — 71046 X-RAY EXAM CHEST 2 VIEWS: CPT

## 2024-06-20 PROCEDURE — 71046 X-RAY EXAM CHEST 2 VIEWS: CPT | Performed by: RADIOLOGY

## 2024-06-20 RX ORDER — SODIUM BICARBONATE 650 MG/1
650 TABLET ORAL 2 TIMES DAILY
COMMUNITY
End: 2024-06-21 | Stop reason: WASHOUT

## 2024-06-20 RX ORDER — DENOSUMAB 60 MG/ML
60 INJECTION SUBCUTANEOUS
COMMUNITY

## 2024-06-20 RX ORDER — CALCIUM CARBONATE 300MG(750)
400 TABLET,CHEWABLE ORAL 2 TIMES DAILY
COMMUNITY
End: 2024-06-21 | Stop reason: WASHOUT

## 2024-06-21 ENCOUNTER — OFFICE VISIT (OUTPATIENT)
Dept: PRIMARY CARE | Facility: CLINIC | Age: 84
End: 2024-06-21
Payer: MEDICARE

## 2024-06-21 ENCOUNTER — TELEPHONE (OUTPATIENT)
Dept: PRIMARY CARE | Facility: CLINIC | Age: 84
End: 2024-06-21

## 2024-06-21 ENCOUNTER — APPOINTMENT (OUTPATIENT)
Dept: CARDIOLOGY | Facility: CLINIC | Age: 84
End: 2024-06-21
Payer: MEDICARE

## 2024-06-21 VITALS
SYSTOLIC BLOOD PRESSURE: 105 MMHG | HEART RATE: 66 BPM | WEIGHT: 101 LBS | BODY MASS INDEX: 19.73 KG/M2 | DIASTOLIC BLOOD PRESSURE: 62 MMHG | TEMPERATURE: 98.6 F | OXYGEN SATURATION: 98 %

## 2024-06-21 DIAGNOSIS — Z85.038 HISTORY OF COLON CANCER: ICD-10-CM

## 2024-06-21 DIAGNOSIS — I34.0 NONRHEUMATIC MITRAL VALVE REGURGITATION: ICD-10-CM

## 2024-06-21 DIAGNOSIS — R06.09 DOE (DYSPNEA ON EXERTION): ICD-10-CM

## 2024-06-21 DIAGNOSIS — M48.061 SPINAL STENOSIS OF LUMBAR REGION, UNSPECIFIED WHETHER NEUROGENIC CLAUDICATION PRESENT: ICD-10-CM

## 2024-06-21 DIAGNOSIS — N18.32 CHRONIC KIDNEY DISEASE (CKD) STAGE G3B/A2, MODERATELY DECREASED GLOMERULAR FILTRATION RATE (GFR) BETWEEN 30-44 ML/MIN/1.73 SQUARE METER AND ALBUMINURIA CREATININE RATIO BETWEEN 30-299 MG/G (C* (MULTI): ICD-10-CM

## 2024-06-21 DIAGNOSIS — C18.9 CARCINOMA OF COLON METASTATIC TO LUNG (MULTI): ICD-10-CM

## 2024-06-21 DIAGNOSIS — R63.4 WEIGHT LOSS, ABNORMAL: ICD-10-CM

## 2024-06-21 DIAGNOSIS — C73 PAPILLARY THYROID CARCINOMA (MULTI): ICD-10-CM

## 2024-06-21 DIAGNOSIS — E89.0 POST-SURGICAL HYPOTHYROIDISM: ICD-10-CM

## 2024-06-21 DIAGNOSIS — C78.00 CARCINOMA OF COLON METASTATIC TO LUNG (MULTI): ICD-10-CM

## 2024-06-21 DIAGNOSIS — E87.20 ACIDOSIS, UNSPECIFIED: ICD-10-CM

## 2024-06-21 DIAGNOSIS — R13.12 OROPHARYNGEAL DYSPHAGIA: ICD-10-CM

## 2024-06-21 DIAGNOSIS — I10 ESSENTIAL HYPERTENSION: Primary | ICD-10-CM

## 2024-06-21 DIAGNOSIS — Z85.29 HISTORY OF MALIGNANT NEOPLASM OF THORACIC CAVITY STRUCTURE: ICD-10-CM

## 2024-06-21 DIAGNOSIS — D53.9 MACROCYTIC ANEMIA: ICD-10-CM

## 2024-06-21 DIAGNOSIS — I50.32 CHRONIC DIASTOLIC HEART FAILURE (MULTI): ICD-10-CM

## 2024-06-21 PROCEDURE — 1159F MED LIST DOCD IN RCRD: CPT | Performed by: FAMILY MEDICINE

## 2024-06-21 PROCEDURE — 3078F DIAST BP <80 MM HG: CPT | Performed by: FAMILY MEDICINE

## 2024-06-21 PROCEDURE — 1160F RVW MEDS BY RX/DR IN RCRD: CPT | Performed by: FAMILY MEDICINE

## 2024-06-21 PROCEDURE — 1157F ADVNC CARE PLAN IN RCRD: CPT | Performed by: FAMILY MEDICINE

## 2024-06-21 PROCEDURE — 3074F SYST BP LT 130 MM HG: CPT | Performed by: FAMILY MEDICINE

## 2024-06-21 PROCEDURE — 99215 OFFICE O/P EST HI 40 MIN: CPT | Performed by: FAMILY MEDICINE

## 2024-06-21 PROCEDURE — 1036F TOBACCO NON-USER: CPT | Performed by: FAMILY MEDICINE

## 2024-06-21 NOTE — PROGRESS NOTES
Subjective   Patient ID: Xin Fajardo is a 83 y.o. female who presents for Follow-up ([facility]. Pt states there are no new issues to discuss today. ).    HPI   Ms. Fajardo was seen today, daughter Tigist Casarez present, to establish primary care within the  system.  She has multiple specialists, including:    Dr. Wright/cardiology for chronic diastolic heart failure and non-rheumatic mitral valve regurgitation  Dr. Morris/pain management for lumbar spinal stenosis with radiculopathy  Dr. Lexus Grady/endocrinology ([facility]) for osteoporosis and history of papillary thyroid cancer  Dr. Deneen Quesada/nephrology for kidney disease    Ms. Ni is concerned about loss of appetite, difficulty swallowing at times, general malaise and lethargy.  She has also had a mild degree of dyspnea on exertion, without chest pain or change in lower extremity edema.  She has a malignancy history significant for right-sided colon cancer in 1977, treated with surgery and chemotherapy (5 fluorouracil).  4 years later she had multiple lung, treated with a partial lung resection and again 5 fluorouracil.  Roughly 10 years ago she was diagnosed with papillary thyroid carcinoma and is status post thyroidectomy.    6-4-24 echocardiogram was reassuring, EF was 65-70%, impaired diastolic filling present  6-14-24 had normal stress test, both nuclear and EKG portions    Labs were recently checked 6-4-24, revealing a Creatinine of 1.48, GFR of 35, hemoglobin of 10.1 (stable),     On June 20  she had a chest x-ray done at [facility], with no acute changes.  She has not had colonoscopy in a number of years, I do not see documentation of one going back at least 8 years.  Likewise with EGD.      Review of Systems  The full review of systems is negative with the exception of what is noted in HPI     Problem List     Chronic kidney disease (CKD) stage G3b/A2, moderately decreased glomerular filtration rate (GFR) between 30-44 mL/min/1.73  square meter and albuminuria creatinine ratio between  mg/g (C* (Multi)  Essential hypertension  Chronic low back pain  History of malignant neoplasm of colon  History of malignant neoplasm of thoracic cavity structure  Lumbar radiculopathy  Mixed hyperlipidemia  Multinodular thyroid  Nonrheumatic mitral valve regurgitation  Osteoporosis  Post-surgical hypothyroidism  Sensory hearing loss, bilateral  Urge incontinence  Chronic diastolic heart failure (Multi)  Shortness of breath    Medication(s) are being taken and tolerated as prescribed, without concerns, list reconciled today in EMR    Objective   /62 (BP Location: Right arm, Patient Position: Sitting, BP Cuff Size: Adult)   Pulse 66   Temp 37 °C (98.6 °F) (Temporal)   Wt 45.8 kg (101 lb)   SpO2 98%   BMI 19.73 kg/m²     Physical Exam  Constitutional/General appearance: alert, oriented, thin appearing, in no distress  Head and face exam is normal  No scleral icterus or conjunctival erythema present  Hearing appears to be mildly reduced  Respiratory effort is normal, no dyspnea noted  Cortical function is normal  Mood, affect, are pleasant, appropriate, and interactive.  Insight is normal  Cardiac exam reveals a regular rate and rhythm,  murmur present.   Lungs are clear bilaterally,  Decreased air exchange noted in the left base.  Trace bilateral lower extremity edema present, primarily ankles  Abdomen is soft, nondistended, normal bowel sounds present.  Mild abdominal tenderness without rebound or guarding noted superior to the umbilicus to the epigastric region  Assessment/Plan     Today we discussed in detail Ms. Fajardo's collection of symptoms, including dysphagia, unintentional weight loss, weakness, anorexia, lethargy, all of which have developed in the last 6 weeks.  She also has had a degree of dyspnea on exertion (recent nuclear stress and echocardiogram showed normal EF and no ischemia).  Her prior cancer history is significant for colon  cancer in 1977, colon metastasis to the lung in the early 1980's, and thyroid papillary carcinoma roughly 10 years ago.  Chest x-ray done yesterday showed no acute findings, just postoperative left lung changes.  Recent labs are all near her baseline, including blood counts, kidney/liver/thyroid function.    I recommend a stat CT scan of the chest without IV contrast, as well as a stat CT scan of the abdomen and pelvis, also without IV contrast (given her chronic kidney disease, recent GFR of 35).  I am concerned her symptoms could be from a gastrointestinal etiology, such as esophageal, gastric, colon (given personal history), less likely pancreatic, since her bilirubin, transaminases, and alkaline phosphatase levels are normal.  We will expedite a gastroenterology referral after the CT results are resulted.  We discussed the need for an EGD, and colon cancer review depending on these results.  No changes in medication are warranted at this time.  Patient is pending Monday afternoon June 24 with Dr. Bucky Turcios, bedside PFTs may be of benefit versus formal.  The CT scan from Monday morning available to review as well.    I've asked them to call should symptoms acutely change.  I will call with imaging results when available.      Total time spent with patient, reviewing records, and completing charting was over one hour, over half of it spent counseling and/or coordinating care  **Portions of this medical record have been created using voice recognition software and may have minor errors which are inherent in voice recognition systems. It has not been fully edited for typographical or grammatical errors**

## 2024-06-21 NOTE — TELEPHONE ENCOUNTER
Xin had a chest x-ray done yesterday at [facility], not resulted yet.  I need this ASAP.  (She has a 330 appointment today).

## 2024-06-22 ASSESSMENT — ENCOUNTER SYMPTOMS
PND: 0
DYSPNEA ON EXERTION: 1
EYES NEGATIVE: 1
SYNCOPE: 0
PSYCHIATRIC NEGATIVE: 1
CONSTITUTIONAL NEGATIVE: 1
HEMATOLOGIC/LYMPHATIC NEGATIVE: 1
MUSCULOSKELETAL NEGATIVE: 1
ENDOCRINE NEGATIVE: 1
ORTHOPNEA: 0
SHORTNESS OF BREATH: 1
ALLERGIC/IMMUNOLOGIC NEGATIVE: 1
PALPITATIONS: 0
NEUROLOGICAL NEGATIVE: 1
GASTROINTESTINAL NEGATIVE: 1
NEAR-SYNCOPE: 0

## 2024-06-24 ENCOUNTER — HOSPITAL ENCOUNTER (OUTPATIENT)
Dept: RADIOLOGY | Facility: CLINIC | Age: 84
Discharge: HOME | End: 2024-06-24
Payer: MEDICARE

## 2024-06-24 ENCOUNTER — OFFICE VISIT (OUTPATIENT)
Dept: PULMONOLOGY | Facility: CLINIC | Age: 84
End: 2024-06-24
Payer: MEDICARE

## 2024-06-24 ENCOUNTER — LAB (OUTPATIENT)
Dept: LAB | Facility: LAB | Age: 84
End: 2024-06-24
Payer: MEDICARE

## 2024-06-24 VITALS
BODY MASS INDEX: 19.73 KG/M2 | DIASTOLIC BLOOD PRESSURE: 58 MMHG | SYSTOLIC BLOOD PRESSURE: 147 MMHG | RESPIRATION RATE: 16 BRPM | TEMPERATURE: 97.2 F | WEIGHT: 101 LBS | OXYGEN SATURATION: 100 % | HEART RATE: 67 BPM

## 2024-06-24 DIAGNOSIS — R63.4 WEIGHT LOSS, ABNORMAL: ICD-10-CM

## 2024-06-24 DIAGNOSIS — R06.00 DYSPNEA, UNSPECIFIED TYPE: Primary | ICD-10-CM

## 2024-06-24 DIAGNOSIS — Z85.038 HISTORY OF COLON CANCER: ICD-10-CM

## 2024-06-24 DIAGNOSIS — R06.00 DYSPNEA, UNSPECIFIED TYPE: ICD-10-CM

## 2024-06-24 LAB
ANION GAP SERPL CALC-SCNC: 15 MMOL/L (ref 10–20)
BUN SERPL-MCNC: 60 MG/DL (ref 6–23)
CALCIUM SERPL-MCNC: 8.4 MG/DL (ref 8.6–10.3)
CHLORIDE SERPL-SCNC: 120 MMOL/L (ref 98–107)
CO2 SERPL-SCNC: 11 MMOL/L (ref 21–32)
CREAT SERPL-MCNC: 1.54 MG/DL (ref 0.5–1.05)
D DIMER PPP FEU-MCNC: ABNORMAL NG/ML FEU
EGFRCR SERPLBLD CKD-EPI 2021: 33 ML/MIN/1.73M*2
GLUCOSE SERPL-MCNC: 96 MG/DL (ref 74–99)
POTASSIUM SERPL-SCNC: 4.4 MMOL/L (ref 3.5–5.3)
SODIUM SERPL-SCNC: 142 MMOL/L (ref 136–145)

## 2024-06-24 PROCEDURE — 3077F SYST BP >= 140 MM HG: CPT | Performed by: INTERNAL MEDICINE

## 2024-06-24 PROCEDURE — A9698 NON-RAD CONTRAST MATERIALNOC: HCPCS | Performed by: FAMILY MEDICINE

## 2024-06-24 PROCEDURE — 99215 OFFICE O/P EST HI 40 MIN: CPT | Performed by: INTERNAL MEDICINE

## 2024-06-24 PROCEDURE — 2550000001 HC RX 255 CONTRASTS: Performed by: FAMILY MEDICINE

## 2024-06-24 PROCEDURE — 85379 FIBRIN DEGRADATION QUANT: CPT

## 2024-06-24 PROCEDURE — 74176 CT ABD & PELVIS W/O CONTRAST: CPT | Performed by: RADIOLOGY

## 2024-06-24 PROCEDURE — 71250 CT THORAX DX C-: CPT | Performed by: RADIOLOGY

## 2024-06-24 PROCEDURE — 74176 CT ABD & PELVIS W/O CONTRAST: CPT

## 2024-06-24 PROCEDURE — 3078F DIAST BP <80 MM HG: CPT | Performed by: INTERNAL MEDICINE

## 2024-06-24 PROCEDURE — 1036F TOBACCO NON-USER: CPT | Performed by: INTERNAL MEDICINE

## 2024-06-24 PROCEDURE — 1159F MED LIST DOCD IN RCRD: CPT | Performed by: INTERNAL MEDICINE

## 2024-06-24 PROCEDURE — 80048 BASIC METABOLIC PNL TOTAL CA: CPT

## 2024-06-24 PROCEDURE — 1157F ADVNC CARE PLAN IN RCRD: CPT | Performed by: INTERNAL MEDICINE

## 2024-06-24 PROCEDURE — 1160F RVW MEDS BY RX/DR IN RCRD: CPT | Performed by: INTERNAL MEDICINE

## 2024-06-24 PROCEDURE — 36415 COLL VENOUS BLD VENIPUNCTURE: CPT

## 2024-06-24 PROCEDURE — 99205 OFFICE O/P NEW HI 60 MIN: CPT | Performed by: INTERNAL MEDICINE

## 2024-06-24 ASSESSMENT — ENCOUNTER SYMPTOMS
UNEXPECTED WEIGHT CHANGE: 1
HEADACHES: 0
SINUS PRESSURE: 0
HEMATURIA: 0
LIGHT-HEADEDNESS: 1
CONSTIPATION: 0
APPETITE CHANGE: 1
WEAKNESS: 0
ABDOMINAL DISTENTION: 0
FREQUENCY: 0
FACIAL SWELLING: 0
ADENOPATHY: 0
EYE REDNESS: 0
PALPITATIONS: 0
DIZZINESS: 0
RHINORRHEA: 0
JOINT SWELLING: 0
AGITATION: 0
NUMBNESS: 0
APNEA: 0
CHOKING: 0
DIFFICULTY URINATING: 0
NERVOUS/ANXIOUS: 0
SLEEP DISTURBANCE: 0
WHEEZING: 0
BRUISES/BLEEDS EASILY: 0
NAUSEA: 0
FEVER: 0
SINUS PAIN: 0
EYE DISCHARGE: 0
FATIGUE: 0
COUGH: 0
ARTHRALGIAS: 0
DYSURIA: 0
ABDOMINAL PAIN: 0
SPEECH DIFFICULTY: 0
SHORTNESS OF BREATH: 1
STRIDOR: 0
TREMORS: 0

## 2024-06-24 NOTE — PROGRESS NOTES
@PULMONARY CONSULTATION@         Reason for Consult:   SOUZA    ASSESSMENT:   The patient is a 83-year-old with chronic congestive heart failure, mitral regurgitation, hypertension, dyslipidemia and hypothyroidism with chronic kidney disease.  She had colon cancer resection in 1977 with metastatic colon cancer to the lung resected in 1981.  She was treated with 5-FU for a period of time.  She has noted shortness of breath over the last year or so worse over the last 2 months.  She is short of breath walking down the hallway and climbing any number of steps.  She has mMRC class I shortness of breath.  She has no wheezing and has a remote mild smoking history.  Of concern is thromboembolic disease in view of the chest x-ray revealing clear lung fields except for the chronic findings on the left related to her previous lung resection.  Her cardiac workup to date has been negative.  Also of concern is the weight loss over the last several months with decrease in appetite.  1 would wonder about an underlying malignancy and hence a increased risk for thromboembolic disease    PLAN:   A stat D-dimer has been ordered and we will decide on further evaluation thereafter          HISTORY OF PRESENT ILLNESS:           The patient is a 83-year-old with chronic congestive heart failure, mitral regurgitation with hypertension, dyslipidemia and hypothyroidism.  She also has stage III chronic kidney disease with osteoporosis and history of colon and lung cancer and thyroid cancer.  She has been reported to have increasing shortness of breath over the last year.    Nuclear stress test from June 17, 2024 revealed the following  1. Normal stress myocardial perfusion imaging in response to  pharmacologic stress.  2. Well-maintained left ventricular function.  77%      The echocardiogram from June 4, 2024 revealed the following   1. Left ventricular systolic function is normal with a 65-70% estimated ejection fraction.   2. Spectral  Doppler shows an impaired relaxation pattern of left ventricular diastolic filling.   3. RVSP within normal limits.      The patient reports that she had a colon resection in 1977 followed in 1981 with resection in her left lower lobe for metastatic adenocarcinoma from her colon.  She did receive 5-FU for a period of time.  She has been stable but over the last year has noted shortness of breath with dyspnea on exertion.  She has no coughing or congestion or wheezing.  Over the last 2 months her breathing has been worse.  She is particularly short of breath when she starts doing things in the morning after getting out of bed.  She has no chest pains.  She broke both ankles in the past and has chronic swelling left greater than right.  She has no history of DVT.  She has lost around 20 pounds for unexplained reasons over the last several months.  There is no family history of breathing problems.  She smoked around 15 years stopping in 1975.  She has no wheezing or asthma.  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  Allergies   Allergen Reactions    Amlodipine Swelling        PAST MEDICAL HISTORY:   chronic congestive heart failure, mitral regurgitation with hypertension, dyslipidemia and hypothyroidism.  She also has stage III chronic kidney disease with osteoporosis and history of colon and lung cancer and thyroid cancer.  Current Outpatient Medications:     carvedilol (Coreg) 6.25 mg tablet, Take by mouth every 12 hours., Disp: , Rfl:     cholecalciferol (Vitamin D-3) 50 mcg (2,000 unit) capsule, Take by mouth., Disp: , Rfl:     denosumab (Prolia) 60 mg/mL syringe, Inject 1 mL (60 mg) under the skin every 6 months., Disp: , Rfl:     losartan (Cozaar) 50 mg tablet, Take 1 tablet (50 mg) by mouth once daily., Disp: , Rfl:     multivitamin-min-iron-FA-vit K (Bariatric Multivitamins) 45 mg iron- 800 mcg-120 mcg capsule, Take by mouth., Disp: , Rfl:     thyroid, pork, ([place]  Thyroid) 90 mg tablet, Take 1 tablet (90 mg) by mouth once daily in the morning. Take before meals., Disp: , Rfl:     torsemide (Demadex) 20 mg tablet, Take 1 tablet (20 mg) by mouth once daily., Disp: , Rfl:     traMADoL-acetaminophen (Ultracet) 37.5-325 mg tablet, Take 1 tablet by mouth every 8 hours if needed for severe pain (7 - 10) for up to 21 doses. Take with acetaminophen 500 mg, Disp: 21 tablet, Rfl: 0    Current Facility-Administered Medications:     perflutren lipid microspheres (Definity) injection 2 mL of dilution, 2 mL of dilution, intravenous, Once in imaging, Idris Wright MD     FAMILY HISTORY:   chronic congestive heart failure, mitral regurgitation with hypertension, dyslipidemia and hypothyroidism.  She also has stage III chronic kidney disease with osteoporosis and history of colon and lung cancer and thyroid cancer.   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 Negative for fatigue and fever.   HENT:  Negative for congestion, facial swelling, nosebleeds, postnasal drip, rhinorrhea, sinus pressure and sinus pain.    Eyes:  Negative for discharge, redness and visual disturbance.   Respiratory:  Positive for shortness of breath. Negative for apnea, cough, choking, wheezing and stridor.    Cardiovascular:  Negative for chest pain, palpitations and leg swelling.   Gastrointestinal:  Negative for abdominal distention, abdominal pain, constipation and nausea.   Endocrine: Negative for cold intolerance and heat intolerance.   Genitourinary:  Negative for difficulty urinating, dysuria, frequency and hematuria.   Musculoskeletal:  Negative for arthralgias, gait problem and joint swelling.   Allergic/Immunologic: Negative for environmental allergies, food allergies  and immunocompromised state.   Neurological:  Positive for light-headedness. Negative for dizziness, tremors, syncope, speech difficulty, weakness, numbness and headaches.   Hematological:  Negative for adenopathy. Does not bruise/bleed easily.   Psychiatric/Behavioral:  Negative for agitation, behavioral problems and sleep disturbance. The patient is not nervous/anxious.         Vitals:    06/24/24 1356   BP: 147/58   Pulse: 67   Resp: 16   Temp: 36.2 °C (97.2 °F)   SpO2: 100%        Physical Exam  Vitals reviewed.   Constitutional:       Appearance: Normal appearance.   HENT:      Head: Normocephalic and atraumatic.   Eyes:      Extraocular Movements: Extraocular movements intact.   Cardiovascular:      Rate and Rhythm: Normal rate and regular rhythm.      Heart sounds: No murmur heard.     No friction rub. No gallop.   Pulmonary:      Effort: Pulmonary effort is normal. No respiratory distress.      Breath sounds: Normal breath sounds. No stridor. No wheezing, rhonchi or rales.   Chest:      Chest wall: No tenderness.   Abdominal:      General: Abdomen is flat. There is no distension.      Palpations: Abdomen is soft. There is no mass.      Tenderness: There is no abdominal tenderness.   Musculoskeletal:         General: Normal range of motion.      Cervical back: Normal range of motion.      Right lower leg: Edema present.      Left lower leg: Edema present.      Comments: Swelling 1+ left greater than right   Skin:     General: Skin is warm and dry.   Neurological:      Mental Status: She is alert and oriented to person, place, and time.   Psychiatric:         Mood and Affect: Mood normal.         Behavior: Behavior normal.

## 2024-06-24 NOTE — H&P (VIEW-ONLY)
@PULMONARY CONSULTATION@         Reason for Consult:   SOUZA    ASSESSMENT:   The patient is a 83-year-old with chronic congestive heart failure, mitral regurgitation, hypertension, dyslipidemia and hypothyroidism with chronic kidney disease.  She had colon cancer resection in 1977 with metastatic colon cancer to the lung resected in 1981.  She was treated with 5-FU for a period of time.  She has noted shortness of breath over the last year or so worse over the last 2 months.  She is short of breath walking down the hallway and climbing any number of steps.  She has mMRC class I shortness of breath.  She has no wheezing and has a remote mild smoking history.  Of concern is thromboembolic disease in view of the chest x-ray revealing clear lung fields except for the chronic findings on the left related to her previous lung resection.  Her cardiac workup to date has been negative.  Also of concern is the weight loss over the last several months with decrease in appetite.  1 would wonder about an underlying malignancy and hence a increased risk for thromboembolic disease    PLAN:   A stat D-dimer has been ordered and we will decide on further evaluation thereafter          HISTORY OF PRESENT ILLNESS:           The patient is a 83-year-old with chronic congestive heart failure, mitral regurgitation with hypertension, dyslipidemia and hypothyroidism.  She also has stage III chronic kidney disease with osteoporosis and history of colon and lung cancer and thyroid cancer.  She has been reported to have increasing shortness of breath over the last year.    Nuclear stress test from June 17, 2024 revealed the following  1. Normal stress myocardial perfusion imaging in response to  pharmacologic stress.  2. Well-maintained left ventricular function.  77%      The echocardiogram from June 4, 2024 revealed the following   1. Left ventricular systolic function is normal with a 65-70% estimated ejection fraction.   2. Spectral  Doppler shows an impaired relaxation pattern of left ventricular diastolic filling.   3. RVSP within normal limits.      The patient reports that she had a colon resection in 1977 followed in 1981 with resection in her left lower lobe for metastatic adenocarcinoma from her colon.  She did receive 5-FU for a period of time.  She has been stable but over the last year has noted shortness of breath with dyspnea on exertion.  She has no coughing or congestion or wheezing.  Over the last 2 months her breathing has been worse.  She is particularly short of breath when she starts doing things in the morning after getting out of bed.  She has no chest pains.  She broke both ankles in the past and has chronic swelling left greater than right.  She has no history of DVT.  She has lost around 20 pounds for unexplained reasons over the last several months.  There is no family history of breathing problems.  She smoked around 15 years stopping in 1975.  She has no wheezing or asthma.  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  Allergies   Allergen Reactions    Amlodipine Swelling        PAST MEDICAL HISTORY:   chronic congestive heart failure, mitral regurgitation with hypertension, dyslipidemia and hypothyroidism.  She also has stage III chronic kidney disease with osteoporosis and history of colon and lung cancer and thyroid cancer.  Current Outpatient Medications:     carvedilol (Coreg) 6.25 mg tablet, Take by mouth every 12 hours., Disp: , Rfl:     cholecalciferol (Vitamin D-3) 50 mcg (2,000 unit) capsule, Take by mouth., Disp: , Rfl:     denosumab (Prolia) 60 mg/mL syringe, Inject 1 mL (60 mg) under the skin every 6 months., Disp: , Rfl:     losartan (Cozaar) 50 mg tablet, Take 1 tablet (50 mg) by mouth once daily., Disp: , Rfl:     multivitamin-min-iron-FA-vit K (Bariatric Multivitamins) 45 mg iron- 800 mcg-120 mcg capsule, Take by mouth., Disp: , Rfl:     thyroid, pork, ([place]  Thyroid) 90 mg tablet, Take 1 tablet (90 mg) by mouth once daily in the morning. Take before meals., Disp: , Rfl:     torsemide (Demadex) 20 mg tablet, Take 1 tablet (20 mg) by mouth once daily., Disp: , Rfl:     traMADoL-acetaminophen (Ultracet) 37.5-325 mg tablet, Take 1 tablet by mouth every 8 hours if needed for severe pain (7 - 10) for up to 21 doses. Take with acetaminophen 500 mg, Disp: 21 tablet, Rfl: 0    Current Facility-Administered Medications:     perflutren lipid microspheres (Definity) injection 2 mL of dilution, 2 mL of dilution, intravenous, Once in imaging, Idris Wright MD     FAMILY HISTORY:   chronic congestive heart failure, mitral regurgitation with hypertension, dyslipidemia and hypothyroidism.  She also has stage III chronic kidney disease with osteoporosis and history of colon and lung cancer and thyroid cancer.   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 Negative for fatigue and fever.   HENT:  Negative for congestion, facial swelling, nosebleeds, postnasal drip, rhinorrhea, sinus pressure and sinus pain.    Eyes:  Negative for discharge, redness and visual disturbance.   Respiratory:  Positive for shortness of breath. Negative for apnea, cough, choking, wheezing and stridor.    Cardiovascular:  Negative for chest pain, palpitations and leg swelling.   Gastrointestinal:  Negative for abdominal distention, abdominal pain, constipation and nausea.   Endocrine: Negative for cold intolerance and heat intolerance.   Genitourinary:  Negative for difficulty urinating, dysuria, frequency and hematuria.   Musculoskeletal:  Negative for arthralgias, gait problem and joint swelling.   Allergic/Immunologic: Negative for environmental allergies, food allergies  and immunocompromised state.   Neurological:  Positive for light-headedness. Negative for dizziness, tremors, syncope, speech difficulty, weakness, numbness and headaches.   Hematological:  Negative for adenopathy. Does not bruise/bleed easily.   Psychiatric/Behavioral:  Negative for agitation, behavioral problems and sleep disturbance. The patient is not nervous/anxious.         Vitals:    06/24/24 1356   BP: 147/58   Pulse: 67   Resp: 16   Temp: 36.2 °C (97.2 °F)   SpO2: 100%        Physical Exam  Vitals reviewed.   Constitutional:       Appearance: Normal appearance.   HENT:      Head: Normocephalic and atraumatic.   Eyes:      Extraocular Movements: Extraocular movements intact.   Cardiovascular:      Rate and Rhythm: Normal rate and regular rhythm.      Heart sounds: No murmur heard.     No friction rub. No gallop.   Pulmonary:      Effort: Pulmonary effort is normal. No respiratory distress.      Breath sounds: Normal breath sounds. No stridor. No wheezing, rhonchi or rales.   Chest:      Chest wall: No tenderness.   Abdominal:      General: Abdomen is flat. There is no distension.      Palpations: Abdomen is soft. There is no mass.      Tenderness: There is no abdominal tenderness.   Musculoskeletal:         General: Normal range of motion.      Cervical back: Normal range of motion.      Right lower leg: Edema present.      Left lower leg: Edema present.      Comments: Swelling 1+ left greater than right   Skin:     General: Skin is warm and dry.   Neurological:      Mental Status: She is alert and oriented to person, place, and time.   Psychiatric:         Mood and Affect: Mood normal.         Behavior: Behavior normal.

## 2024-06-24 NOTE — LETTER
June 24, 2024     Bucky Arrington MD  [address] [facility], [address]  [facility] [address]    Patient: Xin Fajardo   YOB: 1940   Date of Visit: 6/24/2024       Dear Dr. Bucky Arrington MD:    Thank you for referring Xin Fajardo to me for evaluation. Below are my notes for this consultation.  If you have questions, please do not hesitate to call me. I look forward to following your patient along with you.       Sincerely,     Bucky Turcios MD MPH      CC: No Recipients  ______________________________________________________________________________________    @PULMONARY CONSULTATION@         Reason for Consult:   SOUZA    ASSESSMENT:   The patient is a 83-year-old with chronic congestive heart failure, mitral regurgitation, hypertension, dyslipidemia and hypothyroidism with chronic kidney disease.  She had colon cancer resection in 1977 with metastatic colon cancer to the lung resected in 1981.  She was treated with 5-FU for a period of time.  She has noted shortness of breath over the last year or so worse over the last 2 months.  She is short of breath walking down the hallway and climbing any number of steps.  She has mMRC class I shortness of breath.  She has no wheezing and has a remote mild smoking history.  Of concern is thromboembolic disease in view of the chest x-ray revealing clear lung fields except for the chronic findings on the left related to her previous lung resection.  Her cardiac workup to date has been negative.  Also of concern is the weight loss over the last several months with decrease in appetite.  1 would wonder about an underlying malignancy and hence a increased risk for thromboembolic disease    PLAN:   A stat D-dimer has been ordered and we will decide on further evaluation thereafter          HISTORY OF PRESENT ILLNESS:           The patient is a 83-year-old with chronic congestive heart failure, mitral regurgitation with hypertension, dyslipidemia  and hypothyroidism.  She also has stage III chronic kidney disease with osteoporosis and history of colon and lung cancer and thyroid cancer.  She has been reported to have increasing shortness of breath over the last year.    Nuclear stress test from June 17, 2024 revealed the following  1. Normal stress myocardial perfusion imaging in response to  pharmacologic stress.  2. Well-maintained left ventricular function.  77%      The echocardiogram from June 4, 2024 revealed the following   1. Left ventricular systolic function is normal with a 65-70% estimated ejection fraction.   2. Spectral Doppler shows an impaired relaxation pattern of left ventricular diastolic filling.   3. RVSP within normal limits.      The patient reports that she had a colon resection in 1977 followed in 1981 with resection in her left lower lobe for metastatic adenocarcinoma from her colon.  She did receive 5-FU for a period of time.  She has been stable but over the last year has noted shortness of breath with dyspnea on exertion.  She has no coughing or congestion or wheezing.  Over the last 2 months her breathing has been worse.  She is particularly short of breath when she starts doing things in the morning after getting out of bed.  She has no chest pains.  She broke both ankles in the past and has chronic swelling left greater than right.  She has no history of DVT.  She has lost around 20 pounds for unexplained reasons over the last several months.  There is no family history of breathing problems.  She smoked around 15 years stopping in 1975.  She has no wheezing or asthma.  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  Allergies   Allergen Reactions   • Amlodipine Swelling        PAST MEDICAL HISTORY:   chronic congestive heart failure, mitral regurgitation with hypertension, dyslipidemia and hypothyroidism.  She also has stage III chronic kidney disease with osteoporosis and history of colon and  lung cancer and thyroid cancer.  Current Outpatient Medications:   •  carvedilol (Coreg) 6.25 mg tablet, Take by mouth every 12 hours., Disp: , Rfl:   •  cholecalciferol (Vitamin D-3) 50 mcg (2,000 unit) capsule, Take by mouth., Disp: , Rfl:   •  denosumab (Prolia) 60 mg/mL syringe, Inject 1 mL (60 mg) under the skin every 6 months., Disp: , Rfl:   •  losartan (Cozaar) 50 mg tablet, Take 1 tablet (50 mg) by mouth once daily., Disp: , Rfl:   •  multivitamin-min-iron-FA-vit K (Bariatric Multivitamins) 45 mg iron- 800 mcg-120 mcg capsule, Take by mouth., Disp: , Rfl:   •  thyroid, pork, ([place] Thyroid) 90 mg tablet, Take 1 tablet (90 mg) by mouth once daily in the morning. Take before meals., Disp: , Rfl:   •  torsemide (Demadex) 20 mg tablet, Take 1 tablet (20 mg) by mouth once daily., Disp: , Rfl:   •  traMADoL-acetaminophen (Ultracet) 37.5-325 mg tablet, Take 1 tablet by mouth every 8 hours if needed for severe pain (7 - 10) for up to 21 doses. Take with acetaminophen 500 mg, Disp: 21 tablet, Rfl: 0    Current Facility-Administered Medications:   •  perflutren lipid microspheres (Definity) injection 2 mL of dilution, 2 mL of dilution, intravenous, Once in imaging, Idris Wright MD     FAMILY HISTORY:   chronic congestive heart failure, mitral regurgitation with hypertension, dyslipidemia and hypothyroidism.  She also has stage III chronic kidney disease with osteoporosis and history of colon and lung cancer and thyroid cancer.   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 Negative for fatigue and fever.   HENT:  Negative for congestion, facial swelling, nosebleeds, postnasal drip, rhinorrhea, sinus pressure and sinus pain.    Eyes:  Negative for discharge, redness and visual  disturbance.   Respiratory:  Positive for shortness of breath. Negative for apnea, cough, choking, wheezing and stridor.    Cardiovascular:  Negative for chest pain, palpitations and leg swelling.   Gastrointestinal:  Negative for abdominal distention, abdominal pain, constipation and nausea.   Endocrine: Negative for cold intolerance and heat intolerance.   Genitourinary:  Negative for difficulty urinating, dysuria, frequency and hematuria.   Musculoskeletal:  Negative for arthralgias, gait problem and joint swelling.   Allergic/Immunologic: Negative for environmental allergies, food allergies and immunocompromised state.   Neurological:  Positive for light-headedness. Negative for dizziness, tremors, syncope, speech difficulty, weakness, numbness and headaches.   Hematological:  Negative for adenopathy. Does not bruise/bleed easily.   Psychiatric/Behavioral:  Negative for agitation, behavioral problems and sleep disturbance. The patient is not nervous/anxious.         Vitals:    06/24/24 1356   BP: 147/58   Pulse: 67   Resp: 16   Temp: 36.2 °C (97.2 °F)   SpO2: 100%        Physical Exam  Vitals reviewed.   Constitutional:       Appearance: Normal appearance.   HENT:      Head: Normocephalic and atraumatic.   Eyes:      Extraocular Movements: Extraocular movements intact.   Cardiovascular:      Rate and Rhythm: Normal rate and regular rhythm.      Heart sounds: No murmur heard.     No friction rub. No gallop.   Pulmonary:      Effort: Pulmonary effort is normal. No respiratory distress.      Breath sounds: Normal breath sounds. No stridor. No wheezing, rhonchi or rales.   Chest:      Chest wall: No tenderness.   Abdominal:      General: Abdomen is flat. There is no distension.      Palpations: Abdomen is soft. There is no mass.      Tenderness: There is no abdominal tenderness.   Musculoskeletal:         General: Normal range of motion.      Cervical back: Normal range of motion.      Right lower leg: Edema  present.      Left lower leg: Edema present.      Comments: Swelling 1+ left greater than right   Skin:     General: Skin is warm and dry.   Neurological:      Mental Status: She is alert and oriented to person, place, and time.   Psychiatric:         Mood and Affect: Mood normal.         Behavior: Behavior normal.

## 2024-06-25 ENCOUNTER — HOSPITAL ENCOUNTER (OUTPATIENT)
Dept: RADIOLOGY | Facility: HOSPITAL | Age: 84
Discharge: HOME | End: 2024-06-25
Payer: MEDICARE

## 2024-06-25 DIAGNOSIS — R91.1 LUNG NODULE: Primary | ICD-10-CM

## 2024-06-25 DIAGNOSIS — R06.00 DYSPNEA, UNSPECIFIED TYPE: ICD-10-CM

## 2024-06-25 DIAGNOSIS — C34.90 MALIGNANT NEOPLASM OF LUNG, UNSPECIFIED LATERALITY, UNSPECIFIED PART OF LUNG (MULTI): ICD-10-CM

## 2024-06-25 PROCEDURE — 71046 X-RAY EXAM CHEST 2 VIEWS: CPT | Performed by: RADIOLOGY

## 2024-06-25 PROCEDURE — 78580 LUNG PERFUSION IMAGING: CPT

## 2024-06-25 PROCEDURE — 71046 X-RAY EXAM CHEST 2 VIEWS: CPT

## 2024-06-25 PROCEDURE — A9540 TC99M MAA: HCPCS | Performed by: INTERNAL MEDICINE

## 2024-06-25 PROCEDURE — 3430000001 HC RX 343 DIAGNOSTIC RADIOPHARMACEUTICALS: Performed by: INTERNAL MEDICINE

## 2024-06-25 NOTE — PROGRESS NOTES
Patient's lung scan is low probability but with her multiple innumerable pulmonary nodules metastatic cancer is the likely diagnosis.  Looking for a primary is of critical importance and will order a PET scan.

## 2024-06-26 ENCOUNTER — TELEPHONE (OUTPATIENT)
Dept: PRIMARY CARE | Facility: CLINIC | Age: 84
End: 2024-06-26
Payer: MEDICARE

## 2024-06-26 NOTE — PROGRESS NOTES
I spoke with Mrs. Fajardo and her daughter Tigist last evening about the CT scan (chest and abdomen) findings, and the need to order a PET/CT per radiology recommendation.  Also discussed the possibility of a partial small bowel obstruction, and facilitated an appointment with Dr. Ledesma, will be this Thursday, June 27.  Answered all questions, await test results.

## 2024-06-27 ENCOUNTER — HOSPITAL ENCOUNTER (OUTPATIENT)
Dept: RADIOLOGY | Facility: CLINIC | Age: 84
Discharge: HOME | End: 2024-06-27
Payer: MEDICARE

## 2024-06-27 ENCOUNTER — OFFICE VISIT (OUTPATIENT)
Dept: SURGERY | Facility: CLINIC | Age: 84
End: 2024-06-27
Payer: MEDICARE

## 2024-06-27 ENCOUNTER — APPOINTMENT (OUTPATIENT)
Dept: CARDIOLOGY | Facility: CLINIC | Age: 84
End: 2024-06-27
Payer: MEDICARE

## 2024-06-27 VITALS
TEMPERATURE: 97.6 F | HEIGHT: 60 IN | BODY MASS INDEX: 19.71 KG/M2 | WEIGHT: 100.4 LBS | SYSTOLIC BLOOD PRESSURE: 128 MMHG | DIASTOLIC BLOOD PRESSURE: 52 MMHG | HEART RATE: 73 BPM

## 2024-06-27 DIAGNOSIS — Z01.818 PRE-OP TESTING: ICD-10-CM

## 2024-06-27 DIAGNOSIS — R91.1 LUNG NODULE: ICD-10-CM

## 2024-06-27 DIAGNOSIS — Z85.038 HISTORY OF MALIGNANT NEOPLASM OF COLON: ICD-10-CM

## 2024-06-27 DIAGNOSIS — R91.8 LUNG INFILTRATE: Primary | ICD-10-CM

## 2024-06-27 DIAGNOSIS — K56.50: Primary | ICD-10-CM

## 2024-06-27 DIAGNOSIS — C34.90 MALIGNANT NEOPLASM OF LUNG, UNSPECIFIED LATERALITY, UNSPECIFIED PART OF LUNG (MULTI): ICD-10-CM

## 2024-06-27 PROCEDURE — 78815 PET IMAGE W/CT SKULL-THIGH: CPT | Mod: PS

## 2024-06-27 PROCEDURE — 3078F DIAST BP <80 MM HG: CPT | Performed by: SURGERY

## 2024-06-27 PROCEDURE — 1036F TOBACCO NON-USER: CPT | Performed by: SURGERY

## 2024-06-27 PROCEDURE — 3074F SYST BP LT 130 MM HG: CPT | Performed by: SURGERY

## 2024-06-27 PROCEDURE — 1159F MED LIST DOCD IN RCRD: CPT | Performed by: SURGERY

## 2024-06-27 PROCEDURE — A9552 F18 FDG: HCPCS | Performed by: INTERNAL MEDICINE

## 2024-06-27 PROCEDURE — 1160F RVW MEDS BY RX/DR IN RCRD: CPT | Performed by: SURGERY

## 2024-06-27 PROCEDURE — 1125F AMNT PAIN NOTED PAIN PRSNT: CPT | Performed by: SURGERY

## 2024-06-27 PROCEDURE — 3430000001 HC RX 343 DIAGNOSTIC RADIOPHARMACEUTICALS: Performed by: INTERNAL MEDICINE

## 2024-06-27 PROCEDURE — 99204 OFFICE O/P NEW MOD 45 MIN: CPT | Performed by: SURGERY

## 2024-06-27 PROCEDURE — 1157F ADVNC CARE PLAN IN RCRD: CPT | Performed by: SURGERY

## 2024-06-27 RX ORDER — FLUDEOXYGLUCOSE F 18 200 MCI/ML
14.2 INJECTION, SOLUTION INTRAVENOUS
Status: COMPLETED | OUTPATIENT
Start: 2024-06-27 | End: 2024-06-27

## 2024-06-27 RX ORDER — IBUPROFEN 200 MG
400 TABLET ORAL 2 TIMES DAILY
COMMUNITY

## 2024-06-27 ASSESSMENT — PAIN SCALES - GENERAL: PAINLEVEL: 2

## 2024-06-27 NOTE — PROGRESS NOTES
Bronchoscopy Scheduling Request    Pre-bronchoscopy visit: Not needed   Please schedule procedure: Next available    Cytology on-site:  No  Location:  Either location  Performing physician:  General bronchoscopist  Referring physician:  Bucky Turcios MD, Bucky Arrington MD  Indication:  diffuse GGOs, wt loss & SOUZA, remote h/o metastatic colon cancer s/p LLL lobectomy  Sedation / Anesthesia:  GA  Procedure:  BAL, TBBx  Time:  Tier 1  Fluorscopy:   Yes  Imaging needed:  None  Labs:  CBC, BMP  Meds:  None  Special Considerations:  None  Reviewed by:  Ha Lazo MD

## 2024-06-27 NOTE — PROGRESS NOTES
History Of Present Illness  Xin Fajardo is a 83 y.o. female presenting with weight loss and some vague abdominal pains.  She is lost some significant weight with failure to thrive and appetite recently.  She does have a history of colon cancer in the past.  She is also increasingly short of breath.  This all occurred in the last 6 years.  She does have very complex previous abdominal surgeries.  She had a colon resection 1977.  She subsequently had abdominal hernias repaired with mesh at some point time.  In 2013 she had a laparoscopic cholecystectomy.  They did note mesh in her abdominal cavity.  After that operation he subsequent got much worse with her abdominal problems.  She had an enterocutaneous fistula with mesh involvement.  She underwent an extensive exploratory laparotomy at [facility] in 2013.  They excised the infected mesh.  They had to do a right hemicolectomy for that.  They also had to resect a small bowel because of multiple enterotomies.  There is significant intra-abdominal adhesions at that time.  She has had no other abdominal surgeries since then.  She did have a follow-up colonoscopy in 2017 that showed no abnormalities did show that the colonic ileal anastomosis was open.  She has CT scan several days ago that showed some dilated loops of bowel with contrast did go through that.  A follow-up x-ray also showed contrast in the colon.  In comparing that daily for bowel that appears to be where the small bowel anastomosis was.  There is an end-to-end side-to-side anastomosis in 2013.  That can lead if in the area that is chronically dilated.  Looking at old CT scan with similar to that although slightly more dilated.  Patient just feels this lack of eating.  She says she occasionally takes Tums.  She has not been on any proton pump inhibitors.  The CT scan did show some dilated stomach.  The CT scan had food in her stomach although she says she had not eaten since the day before.   So she may have some gastritis possible gastroparesis.  That could be adding to her problem.  She was here with her daughter.  There was some concern on whether or not she could have metastatic disease or new cancer.        Last Recorded Vitals  Blood pressure 128/52, pulse 73, temperature 36.4 °C (97.6 °F), height 1.524 m (5'), weight 45.5 kg (100 lb 6.4 oz).  Physical Examination awake and alert.  Normal respiration.  Her abdomen has incisions from previous surgery.  Do not see any incisional hernias or feel any incisional hernias.  She is not tender in any areas.    Relevant Results I reviewed much of her previous imaging and the CT scans along with most recent CT scan.  Since I had seen her earlier this morning her PET scan had been done that showed no evidence of any metastatic disease.      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  She has had several different bowel resections from initial colon cancer to the enterocutaneous fistula and small bowel excisions.  I think any type of exploratory surgery would be very difficult.  I discussed with her and her daughter just to try a trial of proton pump inhibitors.  Other things that might be causing this would be gastroparesis.  An upper endoscopy might be warranted if she does not improve in the next several weeks with a trial of PPI.  She could also have bacterial overgrowth or intestines.  Presently I think a trial of PPI and trying to advance her diet is warranted.  He will follow-up me as needed.    Pool Ledesma MD FACS  Professor of Surgery  [facility] in Surgical [place]  [facility] [employer]  [address] [place] [address], [id number]  Phone [phone]  email: [email or web address]

## 2024-06-28 ENCOUNTER — DOCUMENTATION (OUTPATIENT)
Dept: PULMONOLOGY | Facility: HOSPITAL | Age: 84
End: 2024-06-28
Payer: MEDICARE

## 2024-06-28 NOTE — PROGRESS NOTES
The PET scan does not reveal any uptake in the lungs but with the marked nodularity we will proceed with bronchoscopy and transbronchial biopsies.  Patient is aware.

## 2024-07-04 ASSESSMENT — ENCOUNTER SYMPTOMS
PND: 0
ENDOCRINE NEGATIVE: 1
GASTROINTESTINAL NEGATIVE: 1
PALPITATIONS: 0
DYSPNEA ON EXERTION: 1
NEUROLOGICAL NEGATIVE: 1
EYES NEGATIVE: 1
HEMATOLOGIC/LYMPHATIC NEGATIVE: 1
ALLERGIC/IMMUNOLOGIC NEGATIVE: 1
SHORTNESS OF BREATH: 1
NEAR-SYNCOPE: 0
SYNCOPE: 0
ORTHOPNEA: 0
MUSCULOSKELETAL NEGATIVE: 1
PSYCHIATRIC NEGATIVE: 1
CONSTITUTIONAL NEGATIVE: 1

## 2024-07-08 ENCOUNTER — ANESTHESIA (OUTPATIENT)
Dept: GASTROENTEROLOGY | Facility: HOSPITAL | Age: 84
End: 2024-07-08
Payer: MEDICARE

## 2024-07-08 ENCOUNTER — HOSPITAL ENCOUNTER (OUTPATIENT)
Dept: RADIOLOGY | Facility: HOSPITAL | Age: 84
Discharge: HOME | End: 2024-07-08
Payer: MEDICARE

## 2024-07-08 ENCOUNTER — ANESTHESIA EVENT (OUTPATIENT)
Dept: GASTROENTEROLOGY | Facility: HOSPITAL | Age: 84
End: 2024-07-08
Payer: MEDICARE

## 2024-07-08 ENCOUNTER — HOSPITAL ENCOUNTER (OUTPATIENT)
Dept: GASTROENTEROLOGY | Facility: HOSPITAL | Age: 84
Discharge: HOME | End: 2024-07-08
Payer: MEDICARE

## 2024-07-08 VITALS
DIASTOLIC BLOOD PRESSURE: 49 MMHG | SYSTOLIC BLOOD PRESSURE: 133 MMHG | HEIGHT: 60 IN | HEART RATE: 83 BPM | BODY MASS INDEX: 19.63 KG/M2 | RESPIRATION RATE: 19 BRPM | TEMPERATURE: 97.3 F | OXYGEN SATURATION: 99 % | WEIGHT: 100 LBS

## 2024-07-08 DIAGNOSIS — R91.8 LUNG INFILTRATE: ICD-10-CM

## 2024-07-08 DIAGNOSIS — Z85.038 HISTORY OF MALIGNANT NEOPLASM OF COLON: ICD-10-CM

## 2024-07-08 DIAGNOSIS — R91.8 OTHER NONSPECIFIC ABNORMAL FINDING OF LUNG FIELD: ICD-10-CM

## 2024-07-08 PROCEDURE — A31624 PR BRONCHOSCOPY,DIAGNOSTIC W LAVAGE: Performed by: ANESTHESIOLOGY

## 2024-07-08 PROCEDURE — 87632 RESP VIRUS 6-11 TARGETS: CPT | Performed by: INTERNAL MEDICINE

## 2024-07-08 PROCEDURE — 2720000007 HC OR 272 NO HCPCS

## 2024-07-08 PROCEDURE — 87801 DETECT AGNT MULT DNA AMPLI: CPT | Performed by: INTERNAL MEDICINE

## 2024-07-08 PROCEDURE — 87305 ASPERGILLUS AG IA: CPT | Performed by: INTERNAL MEDICINE

## 2024-07-08 PROCEDURE — 7100000002 HC RECOVERY ROOM TIME - EACH INCREMENTAL 1 MINUTE

## 2024-07-08 PROCEDURE — 87486 CHLMYD PNEUM DNA AMP PROBE: CPT | Mod: 59 | Performed by: INTERNAL MEDICINE

## 2024-07-08 PROCEDURE — A31624 PR BRONCHOSCOPY,DIAGNOSTIC W LAVAGE: Performed by: INTERNAL MEDICINE

## 2024-07-08 PROCEDURE — 88305 TISSUE EXAM BY PATHOLOGIST: CPT | Mod: TC,AHULAB | Performed by: INTERNAL MEDICINE

## 2024-07-08 PROCEDURE — 99100 ANES PT EXTEME AGE<1 YR&>70: CPT | Performed by: ANESTHESIOLOGY

## 2024-07-08 PROCEDURE — 87075 CULTR BACTERIA EXCEPT BLOOD: CPT | Mod: AHULAB | Performed by: INTERNAL MEDICINE

## 2024-07-08 PROCEDURE — 2500000005 HC RX 250 GENERAL PHARMACY W/O HCPCS: Performed by: INTERNAL MEDICINE

## 2024-07-08 PROCEDURE — 31628 BRONCHOSCOPY/LUNG BX EACH: CPT | Performed by: INTERNAL MEDICINE

## 2024-07-08 PROCEDURE — 87530 HSV DNA QUANT: CPT | Performed by: INTERNAL MEDICINE

## 2024-07-08 PROCEDURE — 7100000009 HC PHASE TWO TIME - INITIAL BASE CHARGE

## 2024-07-08 PROCEDURE — 7100000001 HC RECOVERY ROOM TIME - INITIAL BASE CHARGE

## 2024-07-08 PROCEDURE — 3700000002 HC GENERAL ANESTHESIA TIME - EACH INCREMENTAL 1 MINUTE

## 2024-07-08 PROCEDURE — 3700000001 HC GENERAL ANESTHESIA TIME - INITIAL BASE CHARGE

## 2024-07-08 PROCEDURE — 87385 HISTOPLASMA CAPSUL AG IA: CPT | Performed by: INTERNAL MEDICINE

## 2024-07-08 PROCEDURE — 87799 DETECT AGENT NOS DNA QUANT: CPT | Performed by: INTERNAL MEDICINE

## 2024-07-08 PROCEDURE — 87116 MYCOBACTERIA CULTURE: CPT | Mod: AHULAB | Performed by: INTERNAL MEDICINE

## 2024-07-08 PROCEDURE — 87449 NOS EACH ORGANISM AG IA: CPT | Performed by: INTERNAL MEDICINE

## 2024-07-08 PROCEDURE — 87533 HHV-6 DNA QUANT: CPT | Performed by: INTERNAL MEDICINE

## 2024-07-08 PROCEDURE — 76000 FLUOROSCOPY <1 HR PHYS/QHP: CPT

## 2024-07-08 PROCEDURE — 89051 BODY FLUID CELL COUNT: CPT | Mod: AHULAB | Performed by: INTERNAL MEDICINE

## 2024-07-08 PROCEDURE — 31624 DX BRONCHOSCOPE/LAVAGE: CPT | Performed by: INTERNAL MEDICINE

## 2024-07-08 PROCEDURE — 87102 FUNGUS ISOLATION CULTURE: CPT | Mod: AHULAB | Performed by: INTERNAL MEDICINE

## 2024-07-08 PROCEDURE — 88185 FLOWCYTOMETRY/TC ADD-ON: CPT | Mod: TC,AHULAB | Performed by: INTERNAL MEDICINE

## 2024-07-08 PROCEDURE — 88112 CYTOPATH CELL ENHANCE TECH: CPT | Mod: TC | Performed by: INTERNAL MEDICINE

## 2024-07-08 PROCEDURE — 7100000010 HC PHASE TWO TIME - EACH INCREMENTAL 1 MINUTE

## 2024-07-08 PROCEDURE — 2500000004 HC RX 250 GENERAL PHARMACY W/ HCPCS (ALT 636 FOR OP/ED): Performed by: INTERNAL MEDICINE

## 2024-07-08 RX ORDER — ROCURONIUM BROMIDE 10 MG/ML
INJECTION, SOLUTION INTRAVENOUS AS NEEDED
Status: DISCONTINUED | OUTPATIENT
Start: 2024-07-08 | End: 2024-07-08

## 2024-07-08 RX ORDER — PHENYLEPHRINE HCL IN 0.9% NACL 1 MG/10 ML
SYRINGE (ML) INTRAVENOUS AS NEEDED
Status: DISCONTINUED | OUTPATIENT
Start: 2024-07-08 | End: 2024-07-08

## 2024-07-08 RX ORDER — SODIUM CHLORIDE, SODIUM LACTATE, POTASSIUM CHLORIDE, CALCIUM CHLORIDE 600; 310; 30; 20 MG/100ML; MG/100ML; MG/100ML; MG/100ML
100 INJECTION, SOLUTION INTRAVENOUS CONTINUOUS
Status: DISCONTINUED | OUTPATIENT
Start: 2024-07-08 | End: 2024-07-09 | Stop reason: HOSPADM

## 2024-07-08 RX ORDER — PROPOFOL 10 MG/ML
INJECTION, EMULSION INTRAVENOUS AS NEEDED
Status: DISCONTINUED | OUTPATIENT
Start: 2024-07-08 | End: 2024-07-08

## 2024-07-08 RX ORDER — HYDRALAZINE HYDROCHLORIDE 20 MG/ML
5 INJECTION INTRAMUSCULAR; INTRAVENOUS EVERY 30 MIN PRN
Status: DISCONTINUED | OUTPATIENT
Start: 2024-07-08 | End: 2024-07-09 | Stop reason: HOSPADM

## 2024-07-08 RX ORDER — ONDANSETRON HYDROCHLORIDE 2 MG/ML
4 INJECTION, SOLUTION INTRAVENOUS ONCE AS NEEDED
Status: DISCONTINUED | OUTPATIENT
Start: 2024-07-08 | End: 2024-07-09 | Stop reason: HOSPADM

## 2024-07-08 RX ORDER — LIDOCAINE HYDROCHLORIDE 20 MG/ML
INJECTION, SOLUTION EPIDURAL; INFILTRATION; INTRACAUDAL; PERINEURAL AS NEEDED
Status: DISCONTINUED | OUTPATIENT
Start: 2024-07-08 | End: 2024-07-08

## 2024-07-08 RX ORDER — FENTANYL CITRATE 50 UG/ML
INJECTION, SOLUTION INTRAMUSCULAR; INTRAVENOUS AS NEEDED
Status: DISCONTINUED | OUTPATIENT
Start: 2024-07-08 | End: 2024-07-08

## 2024-07-08 RX ORDER — OXYCODONE HYDROCHLORIDE 5 MG/1
5 TABLET ORAL EVERY 4 HOURS PRN
Status: DISCONTINUED | OUTPATIENT
Start: 2024-07-08 | End: 2024-07-09 | Stop reason: HOSPADM

## 2024-07-08 SDOH — HEALTH STABILITY: MENTAL HEALTH: CURRENT SMOKER: 0

## 2024-07-08 ASSESSMENT — PAIN SCALES - GENERAL
PAINLEVEL_OUTOF10: 0 - NO PAIN

## 2024-07-08 ASSESSMENT — PAIN - FUNCTIONAL ASSESSMENT
PAIN_FUNCTIONAL_ASSESSMENT: 0-10

## 2024-07-08 ASSESSMENT — COLUMBIA-SUICIDE SEVERITY RATING SCALE - C-SSRS
2. HAVE YOU ACTUALLY HAD ANY THOUGHTS OF KILLING YOURSELF?: NO
1. IN THE PAST MONTH, HAVE YOU WISHED YOU WERE DEAD OR WISHED YOU COULD GO TO SLEEP AND NOT WAKE UP?: NO
6. HAVE YOU EVER DONE ANYTHING, STARTED TO DO ANYTHING, OR PREPARED TO DO ANYTHING TO END YOUR LIFE?: NO

## 2024-07-08 NOTE — ANESTHESIA PROCEDURE NOTES
Airway  Date/Time: 7/8/2024 2:29 PM  Urgency: elective    Airway not difficult    Staffing  Performed: CRNA   Authorized by: Priyanka Combs MD    Performed by: Gus Hinton, LUCILLE-CNP, APRN-CRNA  Patient location during procedure: OR    Indications and Patient Condition  Indications for airway management: anesthesia  Spontaneous ventilation: present  Sedation level: deep  Preoxygenated: yes  Patient position: sniffing  MILS maintained throughout  Mask difficulty assessment: 1 - vent by mask  No planned trial extubation    Final Airway Details  Final airway type: endotracheal airway      Successful airway: ETT  Cuffed: yes   Successful intubation technique: direct laryngoscopy  Blade: Salgado  Blade size: #3  ETT size (mm): 7.5  Cormack-Lehane Classification: grade IIa - partial view of glottis  Placement verified by: capnometry   Measured from: teeth  ETT to teeth (cm): 23  Number of attempts at approach: 1

## 2024-07-08 NOTE — ANESTHESIA PREPROCEDURE EVALUATION
"Patient: Xin Fajardo \"Prisca\"    Procedure Information       Date/Time: 07/08/24 1430    Scheduled providers: Liz MCCULLOUGH MD; Priyanka Combs MD; Gus Hinton, LUCILLE-CNP, APRDAVIS-CRNA; Dunia Lozada, MARISSA; Israel Pimentel MA; Danyelle Shahid RN    Procedure: BRONCHOSCOPY    Location: [facility]                                                         Pre- Anesthesia Evaluation                                            Xin Fajardo \"Osiris" is a 83 y.o. female who presents for bronchoscopy.    Past Medical History:   Diagnosis Date    Essential (primary) hypertension     HTN (hypertension), benign    Personal history of other diseases of the circulatory system     History of congestive heart failure    Personal history of other diseases of the circulatory system     History of coronary artery disease    Personal history of other endocrine, nutritional and metabolic disease     History of hypothyroidism    Personal history of other malignant neoplasm of bronchus and lung     History of malignant neoplasm of lung    Personal history of other malignant neoplasm of large intestine     History of malignant neoplasm of colon     Past Surgical History:   Procedure Laterality Date    OTHER SURGICAL HISTORY  11/17/2021    Thyroid surgery    OTHER SURGICAL HISTORY  11/17/2021    Colon surgery    OTHER SURGICAL HISTORY  11/17/2021    Lung lobectomy    OTHER SURGICAL HISTORY  11/17/2021    Hysterectomy    US GUIDED ABSCESS DRAIN  5/29/2013    US GUIDED ABSCESS DRAIN 5/29/2013 [facility] INPATIENT LEGACY    US GUIDED PERCUTANEOUS PERITONEAL OR RETROPERITONEAL FLUID COLLECTION DRAINAGE  5/29/2013    US GUIDED PERCUTANEOUS PERITONEAL OR RETROPERITONEAL FLUID COLLECTION DRAINAGE 5/29/2013 [facility] INPATIENT LEGACY     Social History   She reports that she has quit smoking. Her smoking use included cigarettes. She started smoking about 50 years ago. She has never been exposed to tobacco smoke. She has never used smokeless " tobacco. She reports current alcohol use of about 1.0 - 2.0 standard drink of alcohol per week. She reports that she does not use drugs.    Allergies and Medications   Allergies   Allergen Reactions    Amlodipine Swelling     Current Outpatient Medications   Medication Instructions    carvedilol (Coreg) 6.25 mg tablet oral, Every 12 hours    cholecalciferol (Vitamin D-3) 50 mcg (2,000 unit) capsule oral    ibuprofen 400 mg, oral, 2 times daily    losartan (Cozaar) 50 mg tablet 1 tablet, oral, Daily    multivitamin-min-iron-FA-vit K (Bariatric Multivitamins) 45 mg iron- 800 mcg-120 mcg capsule oral    Prolia 60 mg, subcutaneous, Every 6 months    thyroid (pork) (ARMOUR THYROID) 90 mg, oral, Daily before breakfast    torsemide (DEMADEX) 20 mg, oral, Daily    traMADoL-acetaminophen (Ultracet) 37.5-325 mg tablet 1 tablet, oral, Every 8 hours PRN, Take with acetaminophen 500 mg       Recent Labs     06/07/24  1212   WBC 9.7   HGB 10.1*   HCT 32.5*      *       Recent Labs     06/24/24  1445 06/04/24  1337   GLUCOSE 96 97   EGFR 33* 35*   ANIONGAP 15 16   BUN 60* 43*   CREATININE 1.54* 1.48*    144   K 4.4 3.9   * 121*   CO2 11* 11*   CALCIUM 8.4* 9.3     Recent Labs     06/04/24  1337   PROT 5.9*   BILITOT 0.2   ALKPHOS 47   ALT 12   AST 14     Lab Results   Component Value Date    BNP 72 06/04/2024     EKG 6/7/24    Normal sinus rhythm; normal ECG    Echo 6/4/24    PHYSICIAN INTERPRETATION:  Left Ventricle: The left ventricular systolic function is normal, with an estimated ejection fraction of 65-70%. There are no regional wall motion abnormalities. The left ventricular cavity size is decreased. The left ventricular septal wall thickness is mildly increased. There is mildly increased left ventricular posterior wall thickness. There is left ventricular concentric remodeling. Spectral Doppler shows an impaired relaxation pattern of left ventricular diastolic filling.  Left Atrium: The left  atrium is normal in size.  Right Ventricle: The right ventricle is normal in size. There is normal right ventricular global systolic function.  Right Atrium: The right atrium is normal in size.  Aortic Valve: The aortic valve is trileaflet. There is mild aortic valve cusp calcification. There is mild aortic valve thickening. There is no evidence of aortic valve regurgitation.  Mitral Valve: The mitral valve is mildly thickened. There is mild mitral annular calcification. There is trace to mild mitral valve regurgitation.  Tricuspid Valve: The tricuspid valve is structurally normal. There is trace tricuspid regurgitation. The Doppler estimated RVSP is within normal limits at 24.7 mmHg.  Pulmonic Valve: The pulmonic valve is structurally normal. There is physiologic pulmonic valve regurgitation.  Pericardium: There is a trivial pericardial effusion.  Aorta: The aortic root is normal.  Systemic Veins: The inferior vena cava appears to be of normal size.  In comparison to the previous echocardiogram(s): There are no prior studies on this patient for comparison purposes.      CONCLUSIONS:  1. Left ventricular systolic function is normal with a 65-70% estimated ejection fraction.  2. Spectral Doppler shows an impaired relaxation pattern of left ventricular diastolic filling.  3. RVSP within normal limits.    Nuclear stress Test 6/14/24    Impression  1. Normal stress myocardial perfusion imaging in response to  pharmacologic stress.  2. Well-maintained left ventricular function.  77%      CT CHEST ABDOMEN PELVIS WO CONTRAST 6/24/24    - Impression -  CHEST  1.  Postsurgical changes in the left hemithorax related to a prior  lobectomy.  2. Numerous bilateral ground-glass pulmonary nodules. Metastatic  disease can not be excluded and further evaluation with PET-CT is  suggested.    ABDOMEN - PELVIS  1.  Status post cholecystectomy.  2. Numerous bilateral hypodensities arising off of both kidneys,  which most likely related  to cysts. Confirmation with ultrasound may  be obtained.  3. Status post hysterectomy.    4. Complex postsurgical changes in the bowel, with distension of  proximal small bowel loops measuring up to 5.7 cm in the left mid  abdomen at the level of the surgical sutures. Small-bowel obstruction  can not be excluded.    MACRO:  Critical Finding:  There are multiple critical findings. See  findings. notification was initiated on 6/25/2024 at 9:28 am by  Isabela Menon.  (**-OCF-**)    Signed by: Isabela Menon 6/25/2024 9:28 AM  Dictation workstation:   [id number]  Other Imaging     === 06/25/24 ===    XR CHEST 2 VIEWS    - Impression -  1.  Hyperinflated lungs  2. Small left pleural effusion versus pleural thickening, similar to  the prior      NM PET CT LUNG CA  INITIAL DIAGNOSIS    - Impression -  1. Bilateral subcentimeter ground-glass lung opacities do not  demonstrate significant hypermetabolic activity favoring an  infectious/inflammatory process. Follow-up chest CT is recommended to  ensure improvement/resolution.  2. Focal apparent increased radiotracer uptake in the left  ventricular apex which is nonspecific and may be physiologic, however  correlation with echocardiogram to rule out thrombus may be of value.    Visit Vitals  /56   Pulse 80   Temp 36.3 °C (97.3 °F) (Temporal)   Resp 18   Ht 1.524 m (5')   Wt 45.4 kg (100 lb)   SpO2 100%   BMI 19.53 kg/m²   OB Status Postmenopausal   Smoking Status Former   BSA 1.39 m²     Medical Gas Therapy: None (Room air)         Relevant Problems   Cardiac   (+) Essential hypertension   (+) Mixed hyperlipidemia   (+) Nonrheumatic mitral valve regurgitation      Neuro   (+) Lumbar radiculopathy      Endocrine   (+) Multinodular thyroid   (+) Post-surgical hypothyroidism      Musculoskeletal   (+) Chronic low back pain      HEENT   (+) Sensory hearing loss, bilateral       Clinical information reviewed:   Tobacco  Allergies  Meds   Med Hx  Surg Hx  OB Status  Fam  Hx  Soc   Hx        NPO Detail:  NPO/Void Status  Date of Last Liquid: 07/08/24  Time of Last Liquid: 0900  Date of Last Solid: 07/07/24  Time of Last Solid: 1900         Physical Exam    Airway  Mallampati: II  TM distance: >3 FB  Neck ROM: full     Cardiovascular   Rhythm: regular  Rate: normal     Dental - normal exam     Pulmonary   Comments: Normal RR  Non-labored respiration   Abdominal            Anesthesia Plan    History of general anesthesia?: yes  History of complications of general anesthesia?: no    ASA 3     general   (General with ETT. Standard ASA monitoring)  The patient is not a current smoker.    intravenous induction   Postoperative administration of opioids is intended.  Anesthetic plan and risks discussed with patient.    Plan discussed with CAA and CRNA.

## 2024-07-08 NOTE — LETTER
Dear, Xin Fajardo      6/28/2024                                                    INFORMATION FOR YOUR PROCEDURE     INSTRUCTIONS MUST BE FOLLOWED OR YOU RUN THE RISK OF YOUR CASE BEING CANCELED     Information is attached to this e-mail for your upcoming procedure. (Look for the paperclip in your email to access this information)  Lab requisitions are also included as well as procedural instructions.    You are scheduled for your Bronchoscopy on  7/8/24,    with Dr. Liz Rothman    Arrival is at  01:00  PM,  for Check In prior to your actual procedure time. This allows us to prepare you for your procedure.  Location:   [place] [address]    Check In:  2nd Floor OR Waiting Room  Approach the  for Check In    Patient information is right there if assistance is needed.    NPO (No food or drink) after midnight the night before your procedure. This includes coffee, water, and soda, hard candy, gum or mints.  If taking your morning medications, a small sip of water is allowed to get the medication down.    For your safety, you must have a responsible, adult  accompany you to your procedure.  You will not be permitted to drive yourself home if you have received any type of anesthesia or sedation.    Automated calls about your upcoming scheduled appointments can be quite confusing for patients.    The times may vary depending on what you have scheduled for procedure day. Follow the time/s I have given you  so there is no confusion.    Blood work will need to be done prior to your procedure, preferably at a  Facility.  Your blood work is current.  No need to repeat.      Please reach out to me with any questions you may have Kassidy @ [phone] or   Dwayne @ [phone]    Have a nice day!    Kassidy Presley    Bronchoscopy   Interventional Pulmonology    MD Meir Baig MD Sameer Avasarala,  MD Kalia Mercedes MD        [facility]  Pulmonary, Critical Care and Sleep Medicine  [address] [place] [address]  P -[phone]  - [phone]  [email or web address]

## 2024-07-08 NOTE — ANESTHESIA POSTPROCEDURE EVALUATION
"Patient: Xin Fajardo \"Prisca\"    Procedure Summary       Date: 07/08/24 Room / Location: [facility]    Anesthesia Start: 1422 Anesthesia Stop: 1512    Procedure: BRONCHOSCOPY Diagnosis:       Lung infiltrate      History of malignant neoplasm of colon    Scheduled Providers: Liz MCCULLOUGH MD; Piryanka Combs MD; Gus Hinton, APRN-CNP, APRN-CRNA; Dunia Lozada RN; Danyelle Shahid RN Responsible Provider: Priyanka Combs MD    Anesthesia Type: general ASA Status: 3            Anesthesia Type: general    Vitals Value Taken Time   /58 07/08/24 1530   Temp 36.3 °C (97.3 °F) 07/08/24 1505   Pulse 78 07/08/24 1530   Resp 20 07/08/24 1530   SpO2 99 % 07/08/24 1530       Anesthesia Post Evaluation    Patient location during evaluation: PACU  Patient participation: complete - patient participated  Level of consciousness: awake  Pain management: satisfactory to patient  Multimodal analgesia pain management approach  Airway patency: patent  Cardiovascular status: hemodynamically stable  Respiratory status: spontaneous ventilation  Hydration status: acceptable  Postoperative Nausea and Vomiting: none        No notable events documented.    "

## 2024-07-08 NOTE — INTERVAL H&P NOTE
H&P reviewed. The patient was examined and there are no changes to the H&P.  GA reviewed patient's case, chart, Proceed with bronch.

## 2024-07-09 LAB
BASOPHILS NFR FLD MANUAL: 0 %
BLASTS NFR FLD MANUAL: 0 %
CD3+CD4+ CELLS NFR BLD: 45 %
CD3+CD4+ CELLS/CD3+CD8+ CLL BLD: 0.94 %
CD3+CD8+ CELLS NFR BLD: 48 %
CLARITY FLD: ABNORMAL
COLOR FLD: COLORLESS
EOSINOPHIL NFR FLD MANUAL: 0 %
IMMATURE GRANULOCYTES IN FLUID: 0 %
LYMPHOCYTES NFR FLD MANUAL: 5 %
MONOS+MACROS NFR FLD MANUAL: 91 %
NEUTROPHILS NFR FLD MANUAL: 4 %
OTHER CELLS NFR FLD MANUAL: 0 %
PATH REVIEW-CELL CT,FLUID: NORMAL
PLASMA CELLS NFR FLD MANUAL: 0 %
RBC # FLD AUTO: 1000 /UL
TOTAL CELLS COUNTED FLD: 87
WBC # FLD AUTO: 56 /UL

## 2024-07-10 LAB
ACID FAST STN SPEC: NORMAL
ADENOVIRUS QPCR, VIRC: NOT DETECTED COPIES/ML
ADENOVIRUS RVP, VIRC: NOT DETECTED
ASPERGILLUS GALACTOMANNAN EIA (NON-BLOOD SPECIMEN): 0.07
ASPERGILLUS GALACTOMANNAN EIA (NON-BLOOD SPECIMEN): 0.09
CHLAMYDIA.PNEUMONIAE PCR, VIRC: NOT DETECTED
CYTOMEGALOVIRUS DNA PCR, (NON-BLOOD SPECI: NOT DETECTED IU/ML
ENTEROVIRUS/RHINOVIRUS RVP, VIRC: NOT DETECTED
FUNGITELL BETA-D GLUCAN PCR, QUANTITATIVE (NON-BLOOD SPECIMEN): <45 PG/ML
HERPES SIMPLEX VIRUS TYPE 1 PCR, QUANTITATIVE (NON-CSF, NON-BLOOD SPECIMEN): NOT DETECTED COPIES/ML
HERPES SIMPLEX VIRUS TYPE 2 PCR, QUANTITATIVE (NON-CSF, NON-BLOOD SPECIMEN): NOT DETECTED COPIES/ML
HUMAN BOCAVIRUS RVP, VIRC: NOT DETECTED
HUMAN CORONAVIRUS RVP, VIRC: NOT DETECTED
HUMAN HERPESVIRUS-6 DNA PCR, QUANTITATIVE (NON-BLOOD SPECIMEN): NOT DETECTED COPIES/ML
INFLUENZA A , VIRC: NOT DETECTED
INFLUENZA A H1N1-09 , VIRC: NOT DETECTED
INFLUENZA B PCR, VIRC: NOT DETECTED
LABORATORY COMMENT REPORT: NORMAL
LEGIONELLA PNEUMO PCR, VIRC: NOT DETECTED
LEGIONELLA PNEUMO PCR, VIRC: NOT DETECTED
METAPNEUMOVIRUS , VIRC: NOT DETECTED
MYCOBACTERIUM SPEC CULT: NORMAL
MYCOPLASMA.PNEUMONIAE PCR, VIRC: NOT DETECTED
PAN.LEGIONELLA PCR, VIRC: NOT DETECTED
PAN.LEGIONELLA PCR, VIRC: NOT DETECTED
PARAINFLUENZA PCR, VIRC: NOT DETECTED
PATH REPORT.FINAL DX SPEC: NORMAL
PATH REPORT.FINAL DX SPEC: NORMAL
PATH REPORT.GROSS SPEC: NORMAL
PATH REPORT.GROSS SPEC: NORMAL
PATH REPORT.TOTAL CANCER: NORMAL
PATH REPORT.TOTAL CANCER: NORMAL
PNEUMOCYSTIS PCR,QUANTITATIVE (NON-BLOOD SPECIMEN): NOT DETECTED COPIES/ML
PNEUMOCYSTIS PCR,QUANTITATIVE (NON-BLOOD SPECIMEN): NOT DETECTED COPIES/ML
RSV PCR, RVP, VIRC: NOT DETECTED

## 2024-07-11 LAB
BACTERIA SPEC RESP CULT: NORMAL
GRAM STN SPEC: NORMAL
GRAM STN SPEC: NORMAL
H CAPSUL AG SPEC QL: NOT DETECTED
SCAN RESULT: NORMAL

## 2024-07-12 ENCOUNTER — DOCUMENTATION (OUTPATIENT)
Dept: PULMONOLOGY | Facility: HOSPITAL | Age: 84
End: 2024-07-12
Payer: MEDICARE

## 2024-07-12 DIAGNOSIS — R91.1 LUNG NODULE: Primary | ICD-10-CM

## 2024-07-12 LAB
FUNGUS SPEC CULT: NORMAL
FUNGUS SPEC FUNGUS STN: NORMAL

## 2024-07-12 NOTE — PROGRESS NOTES
Biopsies are negative for any malignancy and cultures to date have not grown anything.  I spoke with the patient.  Will obtain a follow-up CT scan in 3 months.

## 2024-07-17 LAB
ACID FAST STN SPEC: NORMAL
MYCOBACTERIUM SPEC CULT: NORMAL

## 2024-07-22 ENCOUNTER — APPOINTMENT (OUTPATIENT)
Dept: PULMONOLOGY | Facility: HOSPITAL | Age: 84
End: 2024-07-22
Payer: MEDICARE

## 2024-07-23 ENCOUNTER — APPOINTMENT (OUTPATIENT)
Dept: DERMATOLOGY | Facility: CLINIC | Age: 84
End: 2024-07-23
Payer: MEDICARE

## 2024-07-23 VITALS — SYSTOLIC BLOOD PRESSURE: 145 MMHG | DIASTOLIC BLOOD PRESSURE: 73 MMHG | HEART RATE: 76 BPM

## 2024-07-23 DIAGNOSIS — C44.329 SQUAMOUS CELL CANCER OF SKIN OF RIGHT CHEEK: ICD-10-CM

## 2024-07-23 LAB
FUNGUS SPEC CULT: NORMAL
FUNGUS SPEC FUNGUS STN: NORMAL

## 2024-07-23 PROCEDURE — 17311 MOHS 1 STAGE H/N/HF/G: CPT | Performed by: DERMATOLOGY

## 2024-07-23 PROCEDURE — 99204 OFFICE O/P NEW MOD 45 MIN: CPT | Performed by: DERMATOLOGY

## 2024-07-23 PROCEDURE — 13132 CMPLX RPR F/C/C/M/N/AX/G/H/F: CPT | Performed by: DERMATOLOGY

## 2024-07-23 NOTE — LETTER
[facility] Provider/Referral Letter Treatment Plan    Patient: Xin Fajardo   YOB: 1940   Date of Visit: 7/23/2024   MRN: [id number]     Emerald Torres PA-C  [address] [place] [address]    Dear Emerald Torres PA-C,    I had the pleasure of seeing Xin Fajardo today in consultation at your request for evaluation and treatment of:  1. 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  After informed consent was obtained, the patient underwent the procedure without complication.    The skin cancer was removed, wound care instructions were given and the patient was advised to follow up with you.  I will see the patient post-operatively as indicated.    Thank you very much for your confidence in me and for allowing me to share in the care of this patient.    1. 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  Right inferior lateral malar cheek  Is a 1.5 x 1.5 cm scar    Mohs surgery    Consent obtained: written    Universal Protocol:  Procedure explained and questions answered to patient or proxy's satisfaction: Yes    Test results available and properly labeled: Yes    Pathology report reviewed: Yes    External notes reviewed: Yes    Photo or diagram used for site identification: Yes    Site/side marked: Yes    Slide independently reviewed by Mohs surgeon: Yes    Immediately prior to procedure a time out was called: Yes    Patient identity confirmed: verbally with patient  Preparation: Patient was prepped and draped in usual sterile fashion      Anticoagulation:  Was the anticoagulation regimen changed prior to Mohs? No      Anesthesia:  Anesthesia method: local  infiltration  Local anesthetic: lidocaine 1% WITH epi    Procedure Details:  Biopsy accession number: [id number] outside path  Date of biopsy: 5/17/2024  Pre-Op diagnosis: squamous cell carcinoma  SCC subtype: in situ  Surgery side: right  Surgical site (from skin exam): Right inferior lateral malar cheek  Pre-operative length (cm): 1.5  Pre-operative width (cm): 1.5  Indications for Mohs surgery: anatomic location where tissue conservation is critical  Previously treated? No      Micrographic Surgery Details:  Post-operative length (cm): 3  Post-operative width (cm): 2  Number of Mohs stages: 2    Stage 1     Comments: The patient was brought into the operating room and placed in the procedure chair in the appropriate position.  The area positive by previous biopsy was identified and confirmed with the patient. The area of clinically obvious tumor was debulked using a curette and/or scalpel as needed. An incision was made following the Mohs approach through the skin. The specimen was taken to the lab, divided into 2 piece(s) and appropriately chromacoded and processed.    Tumor was seen on the lateral margins as indicated on the on the Mohs map.  Superficial basal cell carcinoma. Histologic examination revealed small buds of atypical basaloid cells with peripheral palisading and tumoral clefting.             Tumor features identified on Mohs section: squamous cell carcinoma     Depth of invasion comment: epidermis    Stage 2     Comments: The area of positivity as noted on the Mohs map in the previous stage was identified and removed using the Mohs technique. The specimen was taken to the lab and appropriately chromacoded and processed in 2 piece(s).     Tumor features identified on Mohs section: no tumor identified    Depth of defect: subcutaneous fat    Patient tolerance of procedure: tolerated well, no immediate complications    Reconstruction:  Was the defect reconstructed? Yes    Was reconstruction performed  by the same Mohs surgeon? Yes    Setting of reconstruction: outpatient office  When was reconstruction performed? same day  Type of reconstruction: linear  Linear reconstruction: complex  Length of linear repair (cm): 7  Subcutaneous Layers (Deep Stitches)   Suture size:  5-0  Suture type:  Vicryl  Stitches:  Buried vertical mattress  Fine/surface layer approximation (top stitches)   Epidermal/Superficial suture size:  5-0  Epidermal/Superficial suture type:  Fast-absorbing gut  Stitches: simple running    Hemostasis achieved with: electrodesiccation  Outcome: patient tolerated procedure well with no complications    Post-procedure details: sterile dressing applied and wound care instructions given    Dressing type: pressure dressing        Other Procedures Placed This Encounter  Staff Communication: Dermatology Local Anesthesia: Site Location: Right Inferior Lateral Malar Cheek 1 % Lidocaine / Epinephrine - Amount: 9 ml         Sincerely,       Zacarias Humphries MD  [facility]

## 2024-07-23 NOTE — PROGRESS NOTES
Mohs Surgery Operative Note    Date of Surgery:  7/23/2024  Surgeon:  Zacarias Humphries MD  Office Location: [address]  [facility] [address]  Dept: [phone]  Dept Fax: [phone]  Referring Provider: Bill Trujillo MD PhD  [address] [facility]  [address], [facility] [address]  [place] [address]      Assessment/Plan   Pre-procedure:   Obtained informed consent: written from patient  The surgical site was identified and confirmed with the patient.     Intra-operative:   Audible time out called at : 1:45 PM 07/23/24  by: Andreina Delarosa MA   Verified patient name, birthdate, site, specimen bottle label & requisition.    The planned procedure(s) was again reviewed with the patient. The risks of bleeding, infection, nerve damage and scarring were reviewed. Written authorization was obtained. The patient identity, surgical site, and planned procedure(s) were verified. The provider acted as both surgeon and pathologist.     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  Right inferior lateral malar cheek    Mohs surgery    Consent obtained: written    Universal Protocol:  Procedure explained and questions answered to patient or proxy's satisfaction: Yes    Test results available and properly labeled: Yes    Pathology report reviewed: Yes    External notes reviewed: Yes    Photo or diagram used for site identification: Yes    Site/side marked: Yes    Slide independently reviewed by Mohs surgeon: Yes    Immediately prior to procedure a time out was called: Yes    Patient identity confirmed: verbally with patient  Preparation: Patient was prepped and draped in usual sterile fashion      Anticoagulation:  Was the anticoagulation regimen changed prior to Mohs? No      Anesthesia:  Anesthesia method: local infiltration  Local anesthetic: lidocaine 1% WITH epi    Procedure Details:  Biopsy accession number: [id number] outside path  Date of biopsy: 5/17/2024  Pre-Op diagnosis: squamous  cell carcinoma  SCC subtype: in situ  Surgery side: right  Surgical site (from skin exam): Right inferior lateral malar cheek  Pre-operative length (cm): 1.5  Pre-operative width (cm): 1.5  Indications for Mohs surgery: anatomic location where tissue conservation is critical  Previously treated? No      Micrographic Surgery Details:  Post-operative length (cm): 3  Post-operative width (cm): 2  Number of Mohs stages: 2    Stage 1     Comments: The patient was brought into the operating room and placed in the procedure chair in the appropriate position.  The area positive by previous biopsy was identified and confirmed with the patient. The area of clinically obvious tumor was debulked using a curette and/or scalpel as needed. An incision was made following the Mohs approach through the skin. The specimen was taken to the lab, divided into 2 piece(s) and appropriately chromacoded and processed.    Tumor was seen on the lateral margins as indicated on the on the Mohs map.  Superficial basal cell carcinoma. Histologic examination revealed small buds of atypical basaloid cells with peripheral palisading and tumoral clefting.    Tumor features identified on Mohs section: squamous cell carcinoma     Depth of invasion comment: epidermis    Stage 2     Comments: The area of positivity as noted on the Mohs map in the previous stage was identified and removed using the Mohs technique. The specimen was taken to the lab and appropriately chromacoded and processed in 2 piece(s).     Tumor features identified on Mohs section: no tumor identified    Depth of defect: subcutaneous fat    Patient tolerance of procedure: tolerated well, no immediate complications    Reconstruction:  Was the defect reconstructed? Yes    Was reconstruction performed by the same Mohs surgeon? Yes    Setting of reconstruction: outpatient office  When was reconstruction performed? same day  Type of reconstruction: linear  Linear reconstruction:  complex  Length of linear repair (cm): 7  Subcutaneous Layers (Deep Stitches)   Suture size:  5-0  Suture type:  Vicryl  Stitches:  Buried vertical mattress  Fine/surface layer approximation (top stitches)   Epidermal/Superficial suture size:  5-0  Epidermal/Superficial suture type:  Fast-absorbing gut  Stitches: simple running    Hemostasis achieved with: electrodesiccation  Outcome: patient tolerated procedure well with no complications    Post-procedure details: sterile dressing applied and wound care instructions given    Dressing type: pressure dressing          Complex Linear Repair - Wide Undermining:  Given the location and size of the defect, it was determined that a complex layered linear closure was required to restore normal anatomy and function. The repair was considered complex because extensive undermining was required and performed. The amount of undermining performed was greater than the maximum width of the defect as measured perpendicular to the closure line along at least one entire edge of the defect. Standing cutaneous cones were removed using Burow's triangles. The wound edges were brought into close approximation with buried vertical mattress sutures. The remainder of the wound was then closed with epidermal top sutures.    The final repair measured 7 cm                Wound care was discussed, and the patient was given written post-operative wound care instructions.      The patient will follow up with Zacarias Humphries MD as needed for any post operative problems or concerns, and will follow up with their primary dermatologist as scheduled.

## 2024-07-23 NOTE — PROGRESS NOTES
"Office Visit Note  Date: 7/23/2024  Surgeon:  Zacarias Humphries MD  Office Location:  [address]  [facility] [address]  Dept: [phone]  Dept Fax: [phone]  Referring Provider: Bill Trujillo MD PhD  [address]  [place] [address]    Subjective   Xin Fajardo \"Osiris" is a 83 y.o. female who presents for the following: MOHS Surgery    According to the patient, the lesion has been present for approximately 6 months at the time of diagnosis.  The lesion is not causing symptoms.  The lesion has not been treated previously.    The patient does not have a pacemaker / defibrillator.  The patient does not have a heart valve / joint replacement.    The patient is not on blood thinners.  The patient does not have a history of hepatitis B or C.  The patient does not have a history of HIV.  The patient does not have a history of immunosuppression (e.g. organ transplantation, malignancy, medications)    Review of Systems:  No other skin or systemic complaints other than what is documented elsewhere in the note.    MEDICAL HISTORY: clinically relevant history including significant past medical history, medications and allergies was reviewed and documented in Epic.    Objective   Well appearing patient in no apparent distress; mood and affect are within normal limits.  Vital signs: See record.  Noted on the Right Inferior Lateral Malar Cheek  Is a 1.5 x 1.5 cm scar        The patient confirmed the identified site.    Discussion:  The nature of the diagnosis was explained. The lesion is a skin cancer.  It has a risk of local growth and distant spread. The condition is associated with sun exposure.  Warning signs of non-melanoma skin cancer discussed. Patient was instructed to perform monthly self skin examination.  We recommended that the patient have regular full skin exams given an increased risk of subsequent skin cancers. The patient was " instructed to use sun protective behaviors including use of broad spectrum sunscreens and sun protective clothing to reduce risk of skin cancers.      Risks, benefits, side effects of Mohs surgery were discussed with patient and the patient voiced understanding.  It was explained that even though the cure rate of Mohs is very high it is not 100%. Risks of surgery including but not limited to bleeding, infection, numbness, nerve damage, and scar were reviewed.  Discussion included wound care requirements, activity restrictions, likely scar outcome and time to heal.     After Mohs surgery, the defect may need to be repaired surgically and the scar may be longer than the original lesion.  Reconstruction options, risks, and benefits were reviewed including second intention healing, linear repair (4-1 ratio was explained), local flaps, skin grafts, cartilage grafts and interpolation flaps (the need for multiple surgeries was explained). Possible outcomes were reviewed including likely scar appearance, failure of flap survival, infection, bleeding and the need for revision surgery.     The pathology was reviewed, the photograph was reviewed, and the referring physician's note was reviewed.    Patient elected for Mohs surgery.    The patient has a squamous cell carcinoma.  The pathology was reviewed, the photograph was reviewed, and the referring physicians note was reviewed.  Multiple treatment options including mohs surgery (which has moderate risk of morbidity) were reviewed.    Medical Decision Making  Column 1- Squamous Cell Carcinoma (1 acute uncomplicated illness- Low)  Column 2- 3 tests reviewed (pathology, photograph, referring physician notes- Moderate)  Column 3- Modertate risk of morbidity from additional treatment- mohs surgry- Moderate)    Overall Moderate MDM

## 2024-07-24 ENCOUNTER — TELEPHONE (OUTPATIENT)
Dept: PRIMARY CARE | Facility: CLINIC | Age: 84
End: 2024-07-24
Payer: MEDICARE

## 2024-07-24 DIAGNOSIS — R13.12 OROPHARYNGEAL DYSPHAGIA: ICD-10-CM

## 2024-07-24 DIAGNOSIS — Z85.038 HISTORY OF COLON CANCER: ICD-10-CM

## 2024-07-24 DIAGNOSIS — R63.4 WEIGHT LOSS, ABNORMAL: Primary | ICD-10-CM

## 2024-07-24 LAB
ACID FAST STN SPEC: NORMAL
MYCOBACTERIUM SPEC CULT: NORMAL

## 2024-07-24 NOTE — TELEPHONE ENCOUNTER
Thanks for checking.  She is in the middle of a significant workup for weight loss, among other symptoms, so will try to get by the end of the year.  I'm in frequent contact with her, so no need to schedule now

## 2024-07-24 NOTE — TELEPHONE ENCOUNTER
----- Message from Cailin KINNEY sent at 7/24/2024 10:41 AM EDT -----  Regarding: follow up needed?  I know this is new patient seen June. She is on her [facility] list of pts not meeting measure. I know your availability is slim.     Nothing upcoming for her with you. Does this patient need to be scheduled for a follow up?     Let me know. thanks

## 2024-07-27 ENCOUNTER — TELEPHONE (OUTPATIENT)
Dept: DERMATOLOGY | Facility: HOSPITAL | Age: 84
End: 2024-07-27
Payer: MEDICARE

## 2024-07-27 NOTE — TELEPHONE ENCOUNTER
TELEPHONE ENCOUNTER  7/27/2024  Xin Fajardo  [phone]    Reason for Call:  - concern for post-op infection  - Noted increased erythema around incision site  - Denied increased pain, increased drainage, malodor, fevers, or chills    Patient advised:  - Clinical photo reviewed  - Inflammation and redness is common a few days after procedure, and can be especially so with absorbable sutures  - At this time, erythema alone keeping to suture line is not a red flag for infection, but recommended that patient continue to wash with gentle soap and water daily and apply vaseline and bandage  - Counseled that if redness further increases, patient notes increase in pain or drainage, or developments systemic symptoms such as fever or chills, patient should call back in to the  to speak to dermatology    This case was discussed with Dr. Tank Garcia.    Debra Gil MD, CESAR  PGY-3, Department of Dermatology

## 2024-07-30 ENCOUNTER — TELEPHONE (OUTPATIENT)
Dept: DERMATOLOGY | Facility: CLINIC | Age: 84
End: 2024-07-30
Payer: MEDICARE

## 2024-07-30 DIAGNOSIS — C44.329 SQUAMOUS CELL CANCER OF SKIN OF RIGHT CHEEK: Primary | ICD-10-CM

## 2024-07-30 RX ORDER — MUPIROCIN 20 MG/G
OINTMENT TOPICAL
Qty: 30 G | Refills: 1 | Status: ON HOLD | OUTPATIENT
Start: 2024-07-30

## 2024-07-30 RX ORDER — DOXYCYCLINE 100 MG/1
TABLET ORAL
Qty: 14 TABLET | Refills: 0 | Status: ON HOLD | OUTPATIENT
Start: 2024-07-30

## 2024-07-31 LAB
ACID FAST STN SPEC: NORMAL
MYCOBACTERIUM SPEC CULT: NORMAL

## 2024-08-01 ENCOUNTER — OFFICE VISIT (OUTPATIENT)
Dept: DERMATOLOGY | Facility: CLINIC | Age: 84
End: 2024-08-01
Payer: MEDICARE

## 2024-08-01 DIAGNOSIS — Z51.89 VISIT FOR WOUND CHECK: ICD-10-CM

## 2024-08-01 DIAGNOSIS — T81.49XA SURGICAL SITE INFECTION: Primary | ICD-10-CM

## 2024-08-01 PROCEDURE — 87077 CULTURE AEROBIC IDENTIFY: CPT | Performed by: DERMATOLOGY

## 2024-08-01 NOTE — PROGRESS NOTES
"Subjective     Prisca Fajardo is a 83 y.o. female who presents for the following: Wound Check ( Right inferior lateral malar cheek).     Today, patient reports that over the last 72 hours she has had worsening redness, tenderness, and purulence of the right cheek in the setting of Mohs surgery on 7/23/24. She had previously been prescribed doxycycline 100 mg BID on the evening of 7/31/24. Her first dose of doxycycline was on the AM of 8/1/24. At the time of our visit, she had taken 3 doses doxycycline. According to patient and daughter, she did not have any fever, chills or systemic systems. Her primary complaint was pain, purulent fluid, and an area was the wound was \"\".    Review of Systems:  Denies fever, chills, chest pain, pain/tenderenss outside of the parameters of the immediate wound.     The following portions of the chart were reviewed this encounter and updated as appropriate:          Specialty Problems    None       Objective   no apparent distress  A focused skin examination  of the face was performed. All findings within normal limits unless otherwise noted below.    - There is a linear wound from prior mohs surgery. The wound is deeply erythematous circumferentially and there is tenderness to touch limited to the area of erythema. There is no tenderness beyond the erythema of the wound.   - The superior pole of the wound expresses purulent fluid.  - At the inferior pole of the wound, there is dehiscence and alin purulent fluid draining from the wound.     Assessment/Plan   1. Visit for wound check  2. Surgical site infection   Right inferior lateral malar cheek  New, acute, not at goal   Patient with Mohs surgery on 7/23/24. At this time, patient has a purulent surgical site infection with associated dehiscence at the inferior pole of the wound. She has no fever, chills, chest pain, or evidence of systemic involvement. On examination, there is no evidence of an abscess, hematoma, or " dissecting cellulitis. Overall, the history and findings are consistent a surgical site infection limited to the wound area. We discussed that we can address the dehiscence in time, but will need to clear the infection first. Our clinical findings and below plan were also discussed with Dr. Bonner and Dr. Jacobo who both agree with our assessment and plan. Patient was provided our contact information and encouraged to reach out at any time. Discussed the following plan with daughter and patient and they are agreeable to the following:  Continue doxycycline 100 mg BID   Bacterial culture obtained  Follow up tomorrow for wound recheck, appointment scheduled for 11:00 am   If surgical site infection improved, continue to monitor and follow up in the outpatient setting.   If surgical site infection appears worse, Dr. Bonner has agreed to facilitate admission.     Specimen A - Tissue/Wound Culture/Smear  Differential Diagnosis: R/O infection   Check Margins Yes/No?:  No  Comments:    Dermpath Lab: N/A    Tank Garcia MD, PGY-5  Micrographic Surgery and Cutaneous Oncology Fellow  8/1/2024    I saw and evaluated the patient, reviewed the Dr. Garcia's notes and discussed the patient's managment.  I agree with the documented findings and plan of care.   Bill Trujillo MD, PhD     Detox Facility

## 2024-08-02 ENCOUNTER — APPOINTMENT (OUTPATIENT)
Dept: CARDIOLOGY | Facility: HOSPITAL | Age: 84
End: 2024-08-02
Payer: MEDICARE

## 2024-08-02 ENCOUNTER — OFFICE VISIT (OUTPATIENT)
Dept: DERMATOLOGY | Facility: CLINIC | Age: 84
End: 2024-08-02
Payer: MEDICARE

## 2024-08-02 ENCOUNTER — HOSPITAL ENCOUNTER (INPATIENT)
Facility: HOSPITAL | Age: 84
End: 2024-08-02
Attending: INTERNAL MEDICINE | Admitting: STUDENT IN AN ORGANIZED HEALTH CARE EDUCATION/TRAINING PROGRAM
Payer: MEDICARE

## 2024-08-02 DIAGNOSIS — D64.9 SYMPTOMATIC ANEMIA: ICD-10-CM

## 2024-08-02 DIAGNOSIS — Z85.038 HISTORY OF MALIGNANT NEOPLASM OF COLON: ICD-10-CM

## 2024-08-02 DIAGNOSIS — E87.20 METABOLIC ACIDOSIS: ICD-10-CM

## 2024-08-02 DIAGNOSIS — N18.32 CHRONIC KIDNEY DISEASE (CKD) STAGE G3B/A2, MODERATELY DECREASED GLOMERULAR FILTRATION RATE (GFR) BETWEEN 30-44 ML/MIN/1.73 SQUARE METER AND ALBUMINURIA CREATININE RATIO BETWEEN 30-299 MG/G (C* (MULTI): ICD-10-CM

## 2024-08-02 DIAGNOSIS — K59.09 OTHER CONSTIPATION: ICD-10-CM

## 2024-08-02 DIAGNOSIS — M10.9 GOUT INVOLVING TOE, UNSPECIFIED CAUSE, UNSPECIFIED CHRONICITY, UNSPECIFIED LATERALITY: ICD-10-CM

## 2024-08-02 DIAGNOSIS — K21.9 GASTROESOPHAGEAL REFLUX DISEASE, UNSPECIFIED WHETHER ESOPHAGITIS PRESENT: ICD-10-CM

## 2024-08-02 DIAGNOSIS — L03.211 FACIAL CELLULITIS: ICD-10-CM

## 2024-08-02 DIAGNOSIS — N18.9 ACUTE KIDNEY INJURY SUPERIMPOSED ON CHRONIC KIDNEY DISEASE (CMS-HCC): ICD-10-CM

## 2024-08-02 DIAGNOSIS — I50.32 CHRONIC DIASTOLIC HEART FAILURE (MULTI): ICD-10-CM

## 2024-08-02 DIAGNOSIS — T81.49XA SURGICAL SITE INFECTION: Primary | ICD-10-CM

## 2024-08-02 DIAGNOSIS — L03.90 CELLULITIS: Primary | ICD-10-CM

## 2024-08-02 DIAGNOSIS — R06.02 SHORTNESS OF BREATH: ICD-10-CM

## 2024-08-02 DIAGNOSIS — K64.9 ACUTE HEMORRHOID: ICD-10-CM

## 2024-08-02 DIAGNOSIS — N17.9 ACUTE KIDNEY INJURY SUPERIMPOSED ON CHRONIC KIDNEY DISEASE (CMS-HCC): ICD-10-CM

## 2024-08-02 DIAGNOSIS — R62.7 FAILURE TO THRIVE IN ADULT: ICD-10-CM

## 2024-08-02 LAB
ALBUMIN SERPL BCP-MCNC: 3.4 G/DL (ref 3.4–5)
ANION GAP BLDV CALCULATED.4IONS-SCNC: 17 MMOL/L (ref 10–25)
ANION GAP SERPL CALC-SCNC: 16 MMOL/L (ref 10–20)
BASE EXCESS BLDV CALC-SCNC: -17.1 MMOL/L (ref -2–3)
BASOPHILS # BLD AUTO: 0.05 X10*3/UL (ref 0–0.1)
BASOPHILS NFR BLD AUTO: 0.4 %
BODY TEMPERATURE: 37 DEGREES CELSIUS
BUN SERPL-MCNC: 55 MG/DL (ref 6–23)
CA-I BLDV-SCNC: 1.4 MMOL/L (ref 1.1–1.33)
CALCIUM SERPL-MCNC: 9.3 MG/DL (ref 8.6–10.6)
CHLORIDE BLDV-SCNC: 116 MMOL/L (ref 98–107)
CHLORIDE SERPL-SCNC: 119 MMOL/L (ref 98–107)
CO2 SERPL-SCNC: 8 MMOL/L (ref 21–32)
CREAT SERPL-MCNC: 1.76 MG/DL (ref 0.5–1.05)
EGFRCR SERPLBLD CKD-EPI 2021: 28 ML/MIN/1.73M*2
EOSINOPHIL # BLD AUTO: 0.32 X10*3/UL (ref 0–0.4)
EOSINOPHIL NFR BLD AUTO: 2.7 %
ERYTHROCYTE [DISTWIDTH] IN BLOOD BY AUTOMATED COUNT: 13.9 % (ref 11.5–14.5)
GLUCOSE BLDV-MCNC: 150 MG/DL (ref 74–99)
GLUCOSE SERPL-MCNC: 92 MG/DL (ref 74–99)
HCO3 BLDV-SCNC: 9.5 MMOL/L (ref 22–26)
HCT VFR BLD AUTO: 28.7 % (ref 36–46)
HCT VFR BLD EST: 31 % (ref 36–46)
HGB BLD-MCNC: 8.6 G/DL (ref 12–16)
HGB BLDV-MCNC: 10.2 G/DL (ref 12–16)
IMM GRANULOCYTES # BLD AUTO: 0.08 X10*3/UL (ref 0–0.5)
IMM GRANULOCYTES NFR BLD AUTO: 0.7 % (ref 0–0.9)
INHALED O2 CONCENTRATION: 0 %
LACTATE BLDV-SCNC: 0.9 MMOL/L (ref 0.4–2)
LYMPHOCYTES # BLD AUTO: 1.55 X10*3/UL (ref 0.8–3)
LYMPHOCYTES NFR BLD AUTO: 13 %
MAGNESIUM SERPL-MCNC: 1.72 MG/DL (ref 1.6–2.4)
MCH RBC QN AUTO: 32 PG (ref 26–34)
MCHC RBC AUTO-ENTMCNC: 30 G/DL (ref 32–36)
MCV RBC AUTO: 107 FL (ref 80–100)
MONOCYTES # BLD AUTO: 1.38 X10*3/UL (ref 0.05–0.8)
MONOCYTES NFR BLD AUTO: 11.6 %
NEUTROPHILS # BLD AUTO: 8.54 X10*3/UL (ref 1.6–5.5)
NEUTROPHILS NFR BLD AUTO: 71.6 %
NRBC BLD-RTO: 0 /100 WBCS (ref 0–0)
OXYHGB MFR BLDV: 24.1 % (ref 45–75)
PCO2 BLDV: 25 MM HG (ref 41–51)
PH BLDV: 7.19 PH (ref 7.33–7.43)
PHOSPHATE SERPL-MCNC: 4.3 MG/DL (ref 2.5–4.9)
PLATELET # BLD AUTO: 299 X10*3/UL (ref 150–450)
PO2 BLDV: 15 MM HG (ref 35–45)
POTASSIUM BLDV-SCNC: 3.8 MMOL/L (ref 3.5–5.3)
POTASSIUM SERPL-SCNC: 4.4 MMOL/L (ref 3.5–5.3)
RBC # BLD AUTO: 2.69 X10*6/UL (ref 4–5.2)
SAO2 % BLDV: 24 % (ref 45–75)
SODIUM BLDV-SCNC: 139 MMOL/L (ref 136–145)
SODIUM SERPL-SCNC: 139 MMOL/L (ref 136–145)
WBC # BLD AUTO: 11.9 X10*3/UL (ref 4.4–11.3)

## 2024-08-02 PROCEDURE — 2500000004 HC RX 250 GENERAL PHARMACY W/ HCPCS (ALT 636 FOR OP/ED)

## 2024-08-02 PROCEDURE — 83735 ASSAY OF MAGNESIUM: CPT

## 2024-08-02 PROCEDURE — 99223 1ST HOSP IP/OBS HIGH 75: CPT | Performed by: STUDENT IN AN ORGANIZED HEALTH CARE EDUCATION/TRAINING PROGRAM

## 2024-08-02 PROCEDURE — 84132 ASSAY OF SERUM POTASSIUM: CPT

## 2024-08-02 PROCEDURE — 83550 IRON BINDING TEST: CPT

## 2024-08-02 PROCEDURE — 85025 COMPLETE CBC W/AUTO DIFF WBC: CPT

## 2024-08-02 PROCEDURE — 1157F ADVNC CARE PLAN IN RCRD: CPT | Performed by: DERMATOLOGY

## 2024-08-02 PROCEDURE — 83540 ASSAY OF IRON: CPT

## 2024-08-02 PROCEDURE — 82728 ASSAY OF FERRITIN: CPT

## 2024-08-02 PROCEDURE — 85045 AUTOMATED RETICULOCYTE COUNT: CPT

## 2024-08-02 PROCEDURE — 2500000001 HC RX 250 WO HCPCS SELF ADMINISTERED DRUGS (ALT 637 FOR MEDICARE OP)

## 2024-08-02 PROCEDURE — 80069 RENAL FUNCTION PANEL: CPT

## 2024-08-02 PROCEDURE — 36415 COLL VENOUS BLD VENIPUNCTURE: CPT

## 2024-08-02 PROCEDURE — 93005 ELECTROCARDIOGRAM TRACING: CPT

## 2024-08-02 PROCEDURE — 93010 ELECTROCARDIOGRAM REPORT: CPT | Performed by: INTERNAL MEDICINE

## 2024-08-02 PROCEDURE — 83605 ASSAY OF LACTIC ACID: CPT

## 2024-08-02 PROCEDURE — 1100000001 HC PRIVATE ROOM DAILY

## 2024-08-02 RX ORDER — POLYETHYLENE GLYCOL 3350 17 G/17G
17 POWDER, FOR SOLUTION ORAL DAILY
Status: DISCONTINUED | OUTPATIENT
Start: 2024-08-02 | End: 2024-08-03

## 2024-08-02 RX ORDER — ONDANSETRON 4 MG/1
4 TABLET, FILM COATED ORAL EVERY 8 HOURS PRN
Status: DISCONTINUED | OUTPATIENT
Start: 2024-08-02 | End: 2024-08-07 | Stop reason: HOSPADM

## 2024-08-02 RX ORDER — AMOXICILLIN 250 MG
2 CAPSULE ORAL 2 TIMES DAILY
Status: DISCONTINUED | OUTPATIENT
Start: 2024-08-02 | End: 2024-08-02

## 2024-08-02 RX ORDER — ACETAMINOPHEN 325 MG/1
650 TABLET ORAL EVERY 4 HOURS PRN
Status: DISCONTINUED | OUTPATIENT
Start: 2024-08-02 | End: 2024-08-03

## 2024-08-02 RX ORDER — ACETAMINOPHEN 160 MG/5ML
650 SOLUTION ORAL EVERY 4 HOURS PRN
Status: DISCONTINUED | OUTPATIENT
Start: 2024-08-02 | End: 2024-08-03

## 2024-08-02 RX ORDER — CEFAZOLIN SODIUM 1 G/50ML
1 SOLUTION INTRAVENOUS EVERY 8 HOURS
Status: DISCONTINUED | OUTPATIENT
Start: 2024-08-02 | End: 2024-08-02

## 2024-08-02 RX ORDER — CEFAZOLIN SODIUM 1 G/50ML
1 SOLUTION INTRAVENOUS ONCE
Status: DISCONTINUED | OUTPATIENT
Start: 2024-08-02 | End: 2024-08-02

## 2024-08-02 RX ORDER — HEPARIN SODIUM 5000 [USP'U]/ML
5000 INJECTION, SOLUTION INTRAVENOUS; SUBCUTANEOUS EVERY 8 HOURS
Status: DISCONTINUED | OUTPATIENT
Start: 2024-08-02 | End: 2024-08-07 | Stop reason: HOSPADM

## 2024-08-02 RX ORDER — CHOLECALCIFEROL (VITAMIN D3) 25 MCG
2000 TABLET ORAL DAILY
Status: DISCONTINUED | OUTPATIENT
Start: 2024-08-02 | End: 2024-08-07 | Stop reason: HOSPADM

## 2024-08-02 RX ORDER — CARVEDILOL 12.5 MG/1
6.25 TABLET ORAL 2 TIMES DAILY
Status: DISCONTINUED | OUTPATIENT
Start: 2024-08-02 | End: 2024-08-07 | Stop reason: HOSPADM

## 2024-08-02 RX ORDER — VANCOMYCIN HYDROCHLORIDE 1 G/200ML
1000 INJECTION, SOLUTION INTRAVENOUS ONCE
Status: COMPLETED | OUTPATIENT
Start: 2024-08-02 | End: 2024-08-03

## 2024-08-02 RX ORDER — VANCOMYCIN HYDROCHLORIDE 1 G/20ML
INJECTION, POWDER, LYOPHILIZED, FOR SOLUTION INTRAVENOUS DAILY PRN
Status: DISCONTINUED | OUTPATIENT
Start: 2024-08-02 | End: 2024-08-04

## 2024-08-02 RX ORDER — AMOXICILLIN 250 MG
2 CAPSULE ORAL 2 TIMES DAILY
Status: DISCONTINUED | OUTPATIENT
Start: 2024-08-03 | End: 2024-08-05

## 2024-08-02 RX ORDER — ACETAMINOPHEN 650 MG/1
650 SUPPOSITORY RECTAL EVERY 4 HOURS PRN
Status: DISCONTINUED | OUTPATIENT
Start: 2024-08-02 | End: 2024-08-03

## 2024-08-02 RX ORDER — ONDANSETRON HYDROCHLORIDE 2 MG/ML
4 INJECTION, SOLUTION INTRAVENOUS EVERY 8 HOURS PRN
Status: DISCONTINUED | OUTPATIENT
Start: 2024-08-02 | End: 2024-08-07 | Stop reason: HOSPADM

## 2024-08-02 RX ORDER — TORSEMIDE 20 MG/1
20 TABLET ORAL DAILY
Status: DISCONTINUED | OUTPATIENT
Start: 2024-08-02 | End: 2024-08-07 | Stop reason: HOSPADM

## 2024-08-02 RX ORDER — HEPARIN SODIUM 5000 [USP'U]/ML
5000 INJECTION, SOLUTION INTRAVENOUS; SUBCUTANEOUS EVERY 8 HOURS
Status: CANCELLED | OUTPATIENT
Start: 2024-08-02

## 2024-08-02 RX ORDER — MIRTAZAPINE 15 MG/1
7.5 TABLET, FILM COATED ORAL NIGHTLY
Status: DISCONTINUED | OUTPATIENT
Start: 2024-08-02 | End: 2024-08-02

## 2024-08-02 RX ORDER — LOSARTAN POTASSIUM 25 MG/1
50 TABLET ORAL DAILY
Status: DISCONTINUED | OUTPATIENT
Start: 2024-08-02 | End: 2024-08-07 | Stop reason: HOSPADM

## 2024-08-02 RX ADMIN — CARVEDILOL 6.25 MG: 12.5 TABLET, FILM COATED ORAL at 22:09

## 2024-08-02 RX ADMIN — POLYETHYLENE GLYCOL 3350 17 G: 17 POWDER, FOR SOLUTION ORAL at 18:21

## 2024-08-02 RX ADMIN — SODIUM CHLORIDE, POTASSIUM CHLORIDE, SODIUM LACTATE AND CALCIUM CHLORIDE 500 ML: 600; 310; 30; 20 INJECTION, SOLUTION INTRAVENOUS at 22:16

## 2024-08-02 RX ADMIN — ACETAMINOPHEN 650 MG: 325 TABLET ORAL at 23:21

## 2024-08-02 RX ADMIN — PIPERACILLIN SODIUM AND TAZOBACTAM SODIUM 2.25 G: 2; .25 INJECTION, SOLUTION INTRAVENOUS at 22:09

## 2024-08-02 SDOH — SOCIAL STABILITY: SOCIAL INSECURITY: DO YOU FEEL UNSAFE GOING BACK TO THE PLACE WHERE YOU ARE LIVING?: NO

## 2024-08-02 SDOH — SOCIAL STABILITY: SOCIAL INSECURITY: ABUSE: ADULT

## 2024-08-02 SDOH — SOCIAL STABILITY: SOCIAL INSECURITY: HAS ANYONE EVER THREATENED TO HURT YOUR FAMILY OR YOUR PETS?: NO

## 2024-08-02 SDOH — SOCIAL STABILITY: SOCIAL INSECURITY: ARE YOU OR HAVE YOU BEEN THREATENED OR ABUSED PHYSICALLY, EMOTIONALLY, OR SEXUALLY BY ANYONE?: NO

## 2024-08-02 SDOH — SOCIAL STABILITY: SOCIAL INSECURITY: WERE YOU ABLE TO COMPLETE ALL THE BEHAVIORAL HEALTH SCREENINGS?: YES

## 2024-08-02 SDOH — SOCIAL STABILITY: SOCIAL INSECURITY: ARE THERE ANY APPARENT SIGNS OF INJURIES/BEHAVIORS THAT COULD BE RELATED TO ABUSE/NEGLECT?: NO

## 2024-08-02 SDOH — SOCIAL STABILITY: SOCIAL INSECURITY: DOES ANYONE TRY TO KEEP YOU FROM HAVING/CONTACTING OTHER FRIENDS OR DOING THINGS OUTSIDE YOUR HOME?: NO

## 2024-08-02 SDOH — SOCIAL STABILITY: SOCIAL INSECURITY: DO YOU FEEL ANYONE HAS EXPLOITED OR TAKEN ADVANTAGE OF YOU FINANCIALLY OR OF YOUR PERSONAL PROPERTY?: NO

## 2024-08-02 SDOH — SOCIAL STABILITY: SOCIAL INSECURITY: HAVE YOU HAD THOUGHTS OF HARMING ANYONE ELSE?: NO

## 2024-08-02 ASSESSMENT — LIFESTYLE VARIABLES
SKIP TO QUESTIONS 9-10: 1
HOW OFTEN DO YOU HAVE A DRINK CONTAINING ALCOHOL: NEVER
AUDIT-C TOTAL SCORE: 0
HOW OFTEN DO YOU HAVE 6 OR MORE DRINKS ON ONE OCCASION: NEVER
AUDIT-C TOTAL SCORE: 0
HOW MANY STANDARD DRINKS CONTAINING ALCOHOL DO YOU HAVE ON A TYPICAL DAY: PATIENT DOES NOT DRINK

## 2024-08-02 ASSESSMENT — PAIN SCALES - GENERAL
PAINLEVEL_OUTOF10: 5 - MODERATE PAIN
PAINLEVEL_OUTOF10: 5 - MODERATE PAIN

## 2024-08-02 ASSESSMENT — COGNITIVE AND FUNCTIONAL STATUS - GENERAL
PATIENT BASELINE BEDBOUND: NO
MOBILITY SCORE: 23
DAILY ACTIVITIY SCORE: 23
CLIMB 3 TO 5 STEPS WITH RAILING: A LITTLE
DRESSING REGULAR LOWER BODY CLOTHING: A LITTLE

## 2024-08-02 ASSESSMENT — PAIN - FUNCTIONAL ASSESSMENT: PAIN_FUNCTIONAL_ASSESSMENT: 0-10

## 2024-08-02 ASSESSMENT — PATIENT HEALTH QUESTIONNAIRE - PHQ9
SUM OF ALL RESPONSES TO PHQ9 QUESTIONS 1 & 2: 0
2. FEELING DOWN, DEPRESSED OR HOPELESS: NOT AT ALL
1. LITTLE INTEREST OR PLEASURE IN DOING THINGS: NOT AT ALL

## 2024-08-02 ASSESSMENT — ACTIVITIES OF DAILY LIVING (ADL)
LACK_OF_TRANSPORTATION: NO
GROOMING: INDEPENDENT
BATHING: NEEDS ASSISTANCE
HEARING - RIGHT EAR: FUNCTIONAL
PATIENT'S MEMORY ADEQUATE TO SAFELY COMPLETE DAILY ACTIVITIES?: YES
FEEDING YOURSELF: INDEPENDENT
WALKS IN HOME: NEEDS ASSISTANCE
ADEQUATE_TO_COMPLETE_ADL: YES
JUDGMENT_ADEQUATE_SAFELY_COMPLETE_DAILY_ACTIVITIES: YES
TOILETING: INDEPENDENT
HEARING - LEFT EAR: FUNCTIONAL
DRESSING YOURSELF: NEEDS ASSISTANCE

## 2024-08-02 ASSESSMENT — COLUMBIA-SUICIDE SEVERITY RATING SCALE - C-SSRS
6. HAVE YOU EVER DONE ANYTHING, STARTED TO DO ANYTHING, OR PREPARED TO DO ANYTHING TO END YOUR LIFE?: NO
1. IN THE PAST MONTH, HAVE YOU WISHED YOU WERE DEAD OR WISHED YOU COULD GO TO SLEEP AND NOT WAKE UP?: NO
2. HAVE YOU ACTUALLY HAD ANY THOUGHTS OF KILLING YOURSELF?: NO

## 2024-08-02 ASSESSMENT — PAIN DESCRIPTION - LOCATION: LOCATION: FACE

## 2024-08-02 ASSESSMENT — PAIN DESCRIPTION - ORIENTATION: ORIENTATION: RIGHT

## 2024-08-02 NOTE — PROGRESS NOTES
Subjective     Prisca Fajardo is a 83 y.o. female who presents for the following: Surgical site infection following     Patient was last seen yesterday for a wound check and diagnosed with a surgical site infection in the setting of Mohs surgery 7/23/24.     She was prescribed doxycycline 100 mg BID on 7/30/24. She took 3 total doses (2x on 7/31/24 and 1x on 8/1/24). At our visit on 8/1/24, we had agreed that she would continue the antibiotics and we would reassess her status the following morning. While she stated she was constipated yesterday, neither she nor her daughter expressed that the constipation was a barrier to her taking her doxycyline.     Today, she states that she stopped her antibiotic secondary to constipation. She reports that her constipation has been going on for 3-4 days (7/29-7/30), but she only started taking the antibiotic on 7/31. Clinically, the wound appears improved from previous, with less purulent drainage. The redness is stable. However, she reports worsening pain around the involved site. She is stable and there is not evidence of systemic involvement.     Per our previous discussion, if patient was not improved     Review of Systems:  Denies fever, chills, chest pain, shortness of breath. Endorses constipation     The following portions of the chart were reviewed this encounter and updated as appropriate:        Specialty Problems    None       Objective   Well appearing patient in no apparent distress; mood and affect are within normal limits.    A focused skin examination of the face was performed. All findings within normal limits unless otherwise noted below.    - There is a linear wound from prior mohs surgery. The wound is deeply erythematous circumferentially and there is tenderness to touch limited to the area of erythema. There remains no tenderness beyond the erythema of the wound.   - The superior pole is erythematous, and there is no purulent fluid. The induration of the  superior pole is improved from previous.   - At the inferior pole of the wound, there is dehiscence and alin purulent fluid draining from the wound, with the purulent fluid improved from previous.     Assessment/Plan     1. Surgical site infection  Acute, not at goal   Patient was previously provided doxycycline 100 mg BID in the setting of a surgical site infection. Patient states that she stopped taking doxycyline secondary to constipation. Provided that the is unable to take her antibiotics in the setting of her GI side effects, we have coordinated with the inpatient team to admit her for localized surgical site infection to ensure she gets her antibiotics and medical management for her constipation. We again emphasized that we can address the dehiscence once her infection is resolved. Patient and son agree to the plan as follows:   - admit to medicine, coordinated by Dr. Jacobo and Dr. Bonner  - antibiotics and fluids per primary team  - continue excellent wound care    I saw and evaluated the patient, reviewed the resident's notes and discussed the patient's managment.  I agree with the documented findings and plan of care.   I spoke with the patient and the patient's son Demetrio and answered all questions.  If the patient cannot take PO abx, I defer to ID to manage this infection.  Considerations of revisions for the scar cannot be planned until the wound has healed.  I am happy to assist in any further need.  Bill Trujillo MD, PhD

## 2024-08-02 NOTE — H&P
"Subjective    History of Present Illness  Xin Fajardo \"Osiris" is a 83 y.o. female with PMH of remote colon + lung cancer, complex abdominal surgical hx, hemorrhoids, papillary thyroid cancer s/p thyroidectomy, chronic diastolic heart failure, mitral regurgitation (moderate per TTE 2022, HTN, HLD, stage IIIb CKD, osteoporosis, macular degeneration,  presenting s/p right cheek Mohs surgery(7/23) now with surgical site infection.    Pt had right cheek Mohs surgery w/ dermatology (Dr. Zacarias Humphries) for superficial basal cell carcinoma on 7/23/24. Discharged with instructions for wound care, not on antibiotics. Pt and daughters noted increased erythema along suture line and called dermatology clinic on 7/27--advised to continue with wound washing. Called dermatology again on 7/30 w/ increased concern for infection--prescribed doxycycline 100mg BID x7 days at that time. Pt took doxycycline for 1.5 days, but then stopped on 8/1 2/2 severe constipation. On 8/1, presented to dermatology clinic. At that time, was told to continue to take doxy, wound cultures were taken, and pt told to return the next day if no improvement. On 8/2, pt represented to dermatology clinic and the decision was made to direct admit her to [facility] for inpatient care of infection and constipation.    The pt was seen on arrival at [facility] in her room resting comfortably in bed. She states that she is continuing to have constipation and her last bowel movement was on 7/30 (3 days ago). She took a laxative on 8/1 with no effect and is now experiencing some liquid stool occasionally when she uses the bathroom which she describes as painful 2/2 known hemorrhoids. Pt denies hx of chronic constipation. Also endorses mild abdominal pain. She denies pain in her cheek, fevers, chills, nausea, vomiting.    The patient lives with her  Yg. She states she has a living will. Her daughter, Emmy, has been involved in her care and is the primary point " of contact for care updates and care decisions if necessary.    She states that at home she takes her CoReg, Walcott Thyroid, Losartan every day and her Torsemide about every other day when her legs feel swollen. States she has stopped taking the mirtazapine because it wasn't helping her sleep.     Home Medications  No current facility-administered medications on file prior to encounter.     Current Outpatient Medications on File Prior to Encounter   Medication Sig Dispense Refill    carvedilol (Coreg) 6.25 mg tablet Take by mouth every 12 hours.      cholecalciferol (Vitamin D-3) 50 mcg (2,000 unit) capsule Take by mouth.      denosumab (Prolia) 60 mg/mL syringe Inject 1 mL (60 mg) under the skin every 6 months.      doxycycline (Adoxa) 100 mg tablet Take 1 pill twice daily.  Take with a meals and swallow with full glass of water.  Do not lie down for at least 30 minutes after 14 tablet 0    ibuprofen 200 mg tablet Take 2 tablets (400 mg) by mouth 2 times a day.      losartan (Cozaar) 50 mg tablet Take 1 tablet (50 mg) by mouth once daily.      mirtazapine (Remeron) 7.5 mg tablet Take 1 tablet (7.5 mg) by mouth once daily at bedtime. 30 tablet 0    multivitamin-min-iron-FA-vit K (Bariatric Multivitamins) 45 mg iron- 800 mcg-120 mcg capsule Take by mouth.      mupirocin (Bactroban) 2 % ointment Apply to surface of wound during dressing changes 30 g 1    thyroid, pork, (Walcott Thyroid) 90 mg tablet Take 1 tablet (90 mg) by mouth once daily in the morning. Take before meals.      torsemide (Demadex) 20 mg tablet Take 1 tablet (20 mg) by mouth once daily.      traMADoL-acetaminophen (Ultracet) 37.5-325 mg tablet Take 1 tablet by mouth every 8 hours if needed for severe pain (7 - 10) for up to 21 doses. Take with acetaminophen 500 mg 21 tablet 0     Past Medical History  - remote colon cancer treated w/ 5-FU colon  - lung cancer  - papillary thyroid cancer s/p thyroidectomy w/ hypothyroidism  - chronic diastolic heart  "failure  - mitral regurgitation (moderate per TTE )  - HTN  - HLD  - Stage IIIb CKD  - Osteoporosis  - Macular degeneration    Social History  - smoked cigarettes 50 yrs ago  - denies alcohol or other drugs    Surgical History  -  - colon resection  -  - metastatic lung cancer requiring left lower lobectomy  -  - hysterectomy  -  repair of ankle fracture  -  - ChemoMohs surgery  -  - abdominal plasty - hernia repair with mesh  -  - laparoscopic cholecystomy  -  - 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, right hemicolectomy, resection of parts of small bowel d/t multiple enterotomies at  (significant abdominal adhesions noted at that time)  -  - colonoscopy (no abnormalities, colonic ileal anastomosis was open)    Family History  Family History   Problem Relation Name Age of Onset    Heart disease Mother      Heart attack Father  56         from MI    Coronary artery disease Father      Breast cancer Sister          Allergies  Amlodipine - lower extremity swelling    Review of systems  - Per HPI    Objective    Last Recorded Vitals  /78   Pulse 79   Temp 36.8 °C (98.3 °F) (Temporal)   Resp 18   Ht 1.473 m (4' 10\")   Wt (!) 43.1 kg (95 lb)   SpO2 100%   BMI 19.86 kg/m²      Physical Exam  Vitals reviewed.   Constitutional:       General: She is not in acute distress.  HENT:      Head: Normocephalic.      Comments: Right cheek wound bandaged with an island dressing; some strikethrough appreciated.     Nose: Nose normal.      Mouth/Throat:      Mouth: Mucous membranes are moist.      Pharynx: Oropharynx is clear.   Eyes:      Conjunctiva/sclera: Conjunctivae normal.   Cardiovascular:      Rate and Rhythm: Normal rate and regular rhythm.      Pulses: Normal pulses.      Heart sounds: Normal heart sounds.   Pulmonary:      Effort: Pulmonary effort is normal.      Breath sounds: Normal breath sounds.   Abdominal:      " General: Abdomen is flat. There is no distension.      Palpations: Abdomen is soft. There is no mass.      Tenderness: There is abdominal tenderness in the left lower quadrant. There is no guarding.      Hernia: No hernia is present.   Musculoskeletal:         General: Normal range of motion.      Cervical back: Normal range of motion.      Right lower leg: Edema present.      Left lower leg: Edema present.   Skin:     General: Skin is warm and dry.      Capillary Refill: Capillary refill takes less than 2 seconds.      Coloration: Skin is not jaundiced.   Neurological:      General: No focal deficit present.      Mental Status: She is alert and oriented to person, place, and time. Mental status is at baseline.   Psychiatric:         Mood and Affect: Mood normal.         Behavior: Behavior normal.         Thought Content: Thought content normal.         Judgment: Judgment normal.         Relevant Results  Labs   Results for orders placed or performed during the hospital encounter of 08/02/24 (from the past 24 hour(s))   CBC and Auto Differential   Result Value Ref Range    WBC 11.9 (H) 4.4 - 11.3 x10*3/uL    nRBC 0.0 0.0 - 0.0 /100 WBCs    RBC 2.69 (L) 4.00 - 5.20 x10*6/uL    Hemoglobin 8.6 (L) 12.0 - 16.0 g/dL    Hematocrit 28.7 (L) 36.0 - 46.0 %     (H) 80 - 100 fL    MCH 32.0 26.0 - 34.0 pg    MCHC 30.0 (L) 32.0 - 36.0 g/dL    RDW 13.9 11.5 - 14.5 %    Platelets 299 150 - 450 x10*3/uL    Neutrophils % 71.6 40.0 - 80.0 %    Immature Granulocytes %, Automated 0.7 0.0 - 0.9 %    Lymphocytes % 13.0 13.0 - 44.0 %    Monocytes % 11.6 2.0 - 10.0 %    Eosinophils % 2.7 0.0 - 6.0 %    Basophils % 0.4 0.0 - 2.0 %    Neutrophils Absolute 8.54 (H) 1.60 - 5.50 x10*3/uL    Immature Granulocytes Absolute, Automated 0.08 0.00 - 0.50 x10*3/uL    Lymphocytes Absolute 1.55 0.80 - 3.00 x10*3/uL    Monocytes Absolute 1.38 (H) 0.05 - 0.80 x10*3/uL    Eosinophils Absolute 0.32 0.00 - 0.40 x10*3/uL    Basophils Absolute 0.05 0.00  "- 0.10 x10*3/uL      Imaging  - no relevant imaging    Micro/Culture Data  Susceptibility data from last 90 days.  Collected Specimen Info Organism   07/08/24 Fluid from BAL Mixed Microorganisms Resembling Upper Respiratory Angela        Assessment and Plan   Summary Assessment  Xin Fajardo \"Osiris" is a 83 y.o. female with PMH of remote colon + lung cancer, complex abdominal surgical hx, hemorrhoids, papillary thyroid cancer s/p thyroidectomy, chronic diastolic heart failure, mitral regurgitation (moderate per TTE 2022, HTN, HLD, stage IIIb CKD, osteoporosis, macular degeneration,  presenting s/p right cheek Mohs surgery (7/23) now with surgical site infection.    Wound culture obtained in dermatology clinic 8/1. Will plan to follow-up results for abx decision making. Pending results, will treat with vanc/zosyn to cover for MRSA and possible pseudomonas given recent surgery.    Given complex abdominal surgical hx, currently suspect constipation is 2/2 to chronic colonic dilation, colonic adhesions, and possible gastroparesis i/s/o acute infection. Will plan to start with Miralax tonight and add Senna tomorrow. Will continue to monitor and escalate as required. Given benign abdominal exam, not currently worried about obstruction or need for urgent surgical intervention.     Problem-Based Plan   #surgical site infection  :: Mohs surgery on 7/23/24  :: purulence noted and doxycyline 100mg BID started on 7/30 for 7 days; pt stopped after 1.5 days  Plan  - f/u wound cultures  - start vancomycin  - start zosyn  - de-escalate abx based on wound culture  - monitor erythema margins    #constipation  :: Differential: chronic colonic dialtion vs colonic adhesions vs gastroparesis vs drug interaction from doxy  :: last bowel movement 7/30  Plan  - start MiraLax tonight  - start Senna tomorrow  - continue to monitor; escalate as needed    Chronic Conditions  #HTN: continue home carvedilol 6.25 mg BID; continue home losartan " 50mg daily  #hypothyroidism: continue home armour thyroid 90mg daily  #chronic diastolic heart failure: continue home torsemide 20mg daily    F: Replete PRN  E: Replete PRN  N: Regular Diet  DVT Ppx: Heparin SubQ  GI Ppx: N/A  Access/Lines: PIV  Abx: Vanc/Zosyn  O2: None    Pain regimen: Tylenol 650mg PRN q4  Nausea regimen: Zofran 4mg PRN q8  Bowel regimen: Senna / Miralax    Code status: Full Code  Emergency contact: Michelle Casarez (daughter) [phone]    Jenny Hauser, MSY4  Internal Medicine   [place] pager [phone]

## 2024-08-03 ENCOUNTER — APPOINTMENT (OUTPATIENT)
Dept: RADIOLOGY | Facility: HOSPITAL | Age: 84
End: 2024-08-03
Payer: MEDICARE

## 2024-08-03 PROBLEM — E06.3 HYPOTHYROIDISM DUE TO HASHIMOTO'S THYROIDITIS: Status: ACTIVE | Noted: 2024-08-03

## 2024-08-03 PROBLEM — L03.211 FACIAL CELLULITIS: Status: ACTIVE | Noted: 2024-08-03

## 2024-08-03 PROBLEM — T81.49XA SURGICAL WOUND INFECTION: Status: ACTIVE | Noted: 2024-08-03

## 2024-08-03 PROBLEM — K59.09 OTHER CONSTIPATION: Status: ACTIVE | Noted: 2024-08-03

## 2024-08-03 PROBLEM — E03.8 HYPOTHYROIDISM DUE TO HASHIMOTO'S THYROIDITIS: Status: ACTIVE | Noted: 2024-08-03

## 2024-08-03 PROBLEM — E87.20 METABOLIC ACIDOSIS: Status: ACTIVE | Noted: 2024-08-03

## 2024-08-03 PROBLEM — N17.9 ACUTE KIDNEY INJURY SUPERIMPOSED ON CHRONIC KIDNEY DISEASE (CMS-HCC): Status: ACTIVE | Noted: 2024-08-03

## 2024-08-03 PROBLEM — N18.9 ACUTE KIDNEY INJURY SUPERIMPOSED ON CHRONIC KIDNEY DISEASE (CMS-HCC): Status: ACTIVE | Noted: 2024-08-03

## 2024-08-03 PROBLEM — R62.7 FAILURE TO THRIVE IN ADULT: Status: ACTIVE | Noted: 2024-08-03

## 2024-08-03 LAB
ABO GROUP (TYPE) IN BLOOD: NORMAL
ALBUMIN SERPL BCP-MCNC: 2.7 G/DL (ref 3.4–5)
ALBUMIN SERPL BCP-MCNC: 2.8 G/DL (ref 3.4–5)
ALP SERPL-CCNC: 41 U/L (ref 33–136)
ALT SERPL W P-5'-P-CCNC: 8 U/L (ref 7–45)
ANION GAP SERPL CALC-SCNC: 15 MMOL/L (ref 10–20)
ANION GAP SERPL CALC-SCNC: 16 MMOL/L (ref 10–20)
ANTIBODY SCREEN: NORMAL
APPEARANCE UR: ABNORMAL
AST SERPL W P-5'-P-CCNC: 14 U/L (ref 9–39)
BASOPHILS # BLD AUTO: 0.05 X10*3/UL (ref 0–0.1)
BASOPHILS NFR BLD AUTO: 0.5 %
BILIRUB SERPL-MCNC: 0.2 MG/DL (ref 0–1.2)
BILIRUB UR STRIP.AUTO-MCNC: NEGATIVE MG/DL
BUN SERPL-MCNC: 43 MG/DL (ref 6–23)
BUN SERPL-MCNC: 51 MG/DL (ref 6–23)
CA-I BLD-SCNC: 1.4 MMOL/L (ref 1.1–1.33)
CALCIUM SERPL-MCNC: 8.4 MG/DL (ref 8.6–10.6)
CALCIUM SERPL-MCNC: 8.6 MG/DL (ref 8.6–10.6)
CHLORIDE SERPL-SCNC: 113 MMOL/L (ref 98–107)
CHLORIDE SERPL-SCNC: 118 MMOL/L (ref 98–107)
CHLORIDE UR-SCNC: 102 MMOL/L
CHLORIDE/CREATININE (MMOL/G) IN URINE: 349 MMOL/G CREAT (ref 38–318)
CO2 SERPL-SCNC: 14 MMOL/L (ref 21–32)
CO2 SERPL-SCNC: 9 MMOL/L (ref 21–32)
COLOR UR: ABNORMAL
CREAT SERPL-MCNC: 1.78 MG/DL (ref 0.5–1.05)
CREAT SERPL-MCNC: 1.8 MG/DL (ref 0.5–1.05)
CREAT UR-MCNC: 29.2 MG/DL (ref 20–320)
EGFRCR SERPLBLD CKD-EPI 2021: 27 ML/MIN/1.73M*2
EGFRCR SERPLBLD CKD-EPI 2021: 28 ML/MIN/1.73M*2
EOSINOPHIL # BLD AUTO: 0.45 X10*3/UL (ref 0–0.4)
EOSINOPHIL NFR BLD AUTO: 4.1 %
ERYTHROCYTE [DISTWIDTH] IN BLOOD BY AUTOMATED COUNT: 13.9 % (ref 11.5–14.5)
FERRITIN SERPL-MCNC: 292 NG/ML (ref 8–150)
FOLATE SERPL-MCNC: 20.5 NG/ML
GLUCOSE SERPL-MCNC: 138 MG/DL (ref 74–99)
GLUCOSE SERPL-MCNC: 84 MG/DL (ref 74–99)
GLUCOSE UR STRIP.AUTO-MCNC: NORMAL MG/DL
HCT VFR BLD AUTO: 26.9 % (ref 36–46)
HGB BLD-MCNC: 8.2 G/DL (ref 12–16)
HGB RETIC QN: 34 PG (ref 28–38)
IMM GRANULOCYTES # BLD AUTO: 0.05 X10*3/UL (ref 0–0.5)
IMM GRANULOCYTES NFR BLD AUTO: 0.5 % (ref 0–0.9)
IMMATURE RETIC FRACTION: 14.1 %
IRON SATN MFR SERPL: 19 % (ref 25–45)
IRON SERPL-MCNC: 42 UG/DL (ref 35–150)
KETONES UR STRIP.AUTO-MCNC: NEGATIVE MG/DL
LACTATE SERPL-SCNC: 0.9 MMOL/L (ref 0.4–2)
LEUKOCYTE ESTERASE UR QL STRIP.AUTO: ABNORMAL
LYMPHOCYTES # BLD AUTO: 1.09 X10*3/UL (ref 0.8–3)
LYMPHOCYTES NFR BLD AUTO: 10 %
MAGNESIUM SERPL-MCNC: 1.54 MG/DL (ref 1.6–2.4)
MAGNESIUM SERPL-MCNC: 2.6 MG/DL (ref 1.6–2.4)
MCH RBC QN AUTO: 32.7 PG (ref 26–34)
MCHC RBC AUTO-ENTMCNC: 30.5 G/DL (ref 32–36)
MCV RBC AUTO: 107 FL (ref 80–100)
MONOCYTES # BLD AUTO: 1.32 X10*3/UL (ref 0.05–0.8)
MONOCYTES NFR BLD AUTO: 12.1 %
MUCOUS THREADS #/AREA URNS AUTO: ABNORMAL /LPF
NEUTROPHILS # BLD AUTO: 7.92 X10*3/UL (ref 1.6–5.5)
NEUTROPHILS NFR BLD AUTO: 72.8 %
NITRITE UR QL STRIP.AUTO: NEGATIVE
NRBC BLD-RTO: 0 /100 WBCS (ref 0–0)
PH UR STRIP.AUTO: 5 [PH]
PHOSPHATE SERPL-MCNC: 3.7 MG/DL (ref 2.5–4.9)
PLATELET # BLD AUTO: 244 X10*3/UL (ref 150–450)
POTASSIUM SERPL-SCNC: 3.5 MMOL/L (ref 3.5–5.3)
POTASSIUM SERPL-SCNC: 4.3 MMOL/L (ref 3.5–5.3)
POTASSIUM UR-SCNC: 28 MMOL/L
POTASSIUM/CREAT UR-RTO: 96 MMOL/G CREAT
PROT SERPL-MCNC: 4.6 G/DL (ref 6.4–8.2)
PROT UR STRIP.AUTO-MCNC: NEGATIVE MG/DL
RBC # BLD AUTO: 2.51 X10*6/UL (ref 4–5.2)
RBC # UR STRIP.AUTO: ABNORMAL /UL
RBC #/AREA URNS AUTO: >20 /HPF
RETICS #: 0.06 X10*6/UL (ref 0.02–0.11)
RETICS/RBC NFR AUTO: 2.4 % (ref 0.5–2)
RH FACTOR (ANTIGEN D): NORMAL
SODIUM SERPL-SCNC: 138 MMOL/L (ref 136–145)
SODIUM SERPL-SCNC: 139 MMOL/L (ref 136–145)
SODIUM UR-SCNC: 81 MMOL/L
SODIUM/CREAT UR-RTO: 277 MMOL/G CREAT
SP GR UR STRIP.AUTO: 1.01
TIBC SERPL-MCNC: 216 UG/DL (ref 240–445)
TRANSFERRIN SERPL-MCNC: 152 MG/DL (ref 200–360)
UIBC SERPL-MCNC: 174 UG/DL (ref 110–370)
UROBILINOGEN UR STRIP.AUTO-MCNC: NORMAL MG/DL
VANCOMYCIN SERPL-MCNC: 12 UG/ML (ref 5–20)
VIT B12 SERPL-MCNC: 376 PG/ML (ref 211–911)
WBC # BLD AUTO: 10.9 X10*3/UL (ref 4.4–11.3)
WBC #/AREA URNS AUTO: >50 /HPF
WBC CLUMPS #/AREA URNS AUTO: ABNORMAL /HPF

## 2024-08-03 PROCEDURE — 83735 ASSAY OF MAGNESIUM: CPT

## 2024-08-03 PROCEDURE — 82330 ASSAY OF CALCIUM: CPT

## 2024-08-03 PROCEDURE — 80069 RENAL FUNCTION PANEL: CPT

## 2024-08-03 PROCEDURE — 82570 ASSAY OF URINE CREATININE: CPT

## 2024-08-03 PROCEDURE — 36415 COLL VENOUS BLD VENIPUNCTURE: CPT

## 2024-08-03 PROCEDURE — 80053 COMPREHEN METABOLIC PANEL: CPT

## 2024-08-03 PROCEDURE — 82746 ASSAY OF FOLIC ACID SERUM: CPT | Performed by: STUDENT IN AN ORGANIZED HEALTH CARE EDUCATION/TRAINING PROGRAM

## 2024-08-03 PROCEDURE — 99233 SBSQ HOSP IP/OBS HIGH 50: CPT | Performed by: STUDENT IN AN ORGANIZED HEALTH CARE EDUCATION/TRAINING PROGRAM

## 2024-08-03 PROCEDURE — 2780000003 HC OR 278 NO HCPCS

## 2024-08-03 PROCEDURE — 84075 ASSAY ALKALINE PHOSPHATASE: CPT

## 2024-08-03 PROCEDURE — 81001 URINALYSIS AUTO W/SCOPE: CPT

## 2024-08-03 PROCEDURE — 05H833Z INSERTION OF INFUSION DEVICE INTO LEFT AXILLARY VEIN, PERCUTANEOUS APPROACH: ICD-10-PCS | Performed by: STUDENT IN AN ORGANIZED HEALTH CARE EDUCATION/TRAINING PROGRAM

## 2024-08-03 PROCEDURE — 82607 VITAMIN B-12: CPT | Performed by: STUDENT IN AN ORGANIZED HEALTH CARE EDUCATION/TRAINING PROGRAM

## 2024-08-03 PROCEDURE — 82436 ASSAY OF URINE CHLORIDE: CPT

## 2024-08-03 PROCEDURE — 85025 COMPLETE CBC W/AUTO DIFF WBC: CPT

## 2024-08-03 PROCEDURE — 86850 RBC ANTIBODY SCREEN: CPT

## 2024-08-03 PROCEDURE — 84466 ASSAY OF TRANSFERRIN: CPT

## 2024-08-03 PROCEDURE — 86618 LYME DISEASE ANTIBODY: CPT | Performed by: STUDENT IN AN ORGANIZED HEALTH CARE EDUCATION/TRAINING PROGRAM

## 2024-08-03 PROCEDURE — 2500000004 HC RX 250 GENERAL PHARMACY W/ HCPCS (ALT 636 FOR OP/ED)

## 2024-08-03 PROCEDURE — 2500000001 HC RX 250 WO HCPCS SELF ADMINISTERED DRUGS (ALT 637 FOR MEDICARE OP)

## 2024-08-03 PROCEDURE — 84443 ASSAY THYROID STIM HORMONE: CPT

## 2024-08-03 PROCEDURE — C1751 CATH, INF, PER/CENT/MIDLINE: HCPCS

## 2024-08-03 PROCEDURE — 2500000004 HC RX 250 GENERAL PHARMACY W/ HCPCS (ALT 636 FOR OP/ED): Performed by: PATHOLOGY

## 2024-08-03 PROCEDURE — 86923 COMPATIBILITY TEST ELECTRIC: CPT

## 2024-08-03 PROCEDURE — 86901 BLOOD TYPING SEROLOGIC RH(D): CPT

## 2024-08-03 PROCEDURE — 1100000001 HC PRIVATE ROOM DAILY

## 2024-08-03 PROCEDURE — 36410 VNPNXR 3YR/> PHY/QHP DX/THER: CPT

## 2024-08-03 PROCEDURE — 2500000004 HC RX 250 GENERAL PHARMACY W/ HCPCS (ALT 636 FOR OP/ED): Performed by: PHARMACIST

## 2024-08-03 PROCEDURE — 80202 ASSAY OF VANCOMYCIN: CPT | Performed by: PHARMACIST

## 2024-08-03 RX ORDER — ACETAMINOPHEN 325 MG/1
975 TABLET ORAL EVERY 8 HOURS PRN
Status: DISCONTINUED | OUTPATIENT
Start: 2024-08-03 | End: 2024-08-07 | Stop reason: HOSPADM

## 2024-08-03 RX ORDER — ACETAMINOPHEN 500 MG
5 TABLET ORAL NIGHTLY PRN
Status: DISCONTINUED | OUTPATIENT
Start: 2024-08-03 | End: 2024-08-07 | Stop reason: HOSPADM

## 2024-08-03 RX ORDER — LIDOCAINE HYDROCHLORIDE 10 MG/ML
5 INJECTION, SOLUTION EPIDURAL; INFILTRATION; INTRACAUDAL; PERINEURAL ONCE
Status: DISCONTINUED | OUTPATIENT
Start: 2024-08-03 | End: 2024-08-07 | Stop reason: HOSPADM

## 2024-08-03 RX ORDER — LIDOCAINE 560 MG/1
1 PATCH PERCUTANEOUS; TOPICAL; TRANSDERMAL DAILY PRN
Status: DISCONTINUED | OUTPATIENT
Start: 2024-08-03 | End: 2024-08-07 | Stop reason: HOSPADM

## 2024-08-03 RX ORDER — VANCOMYCIN HYDROCHLORIDE 500 MG/100ML
500 INJECTION, SOLUTION INTRAVENOUS ONCE
Status: COMPLETED | OUTPATIENT
Start: 2024-08-03 | End: 2024-08-03

## 2024-08-03 RX ORDER — BISACODYL 10 MG/1
10 SUPPOSITORY RECTAL ONCE
Status: COMPLETED | OUTPATIENT
Start: 2024-08-03 | End: 2024-08-03

## 2024-08-03 RX ORDER — POLYETHYLENE GLYCOL 3350 17 G/17G
17 POWDER, FOR SOLUTION ORAL 2 TIMES DAILY
Status: DISCONTINUED | OUTPATIENT
Start: 2024-08-03 | End: 2024-08-05

## 2024-08-03 RX ORDER — CEFAZOLIN SODIUM 1 G/50ML
1 SOLUTION INTRAVENOUS EVERY 12 HOURS
Status: DISCONTINUED | OUTPATIENT
Start: 2024-08-03 | End: 2024-08-07 | Stop reason: HOSPADM

## 2024-08-03 RX ORDER — OXYCODONE HYDROCHLORIDE 5 MG/1
5 TABLET ORAL ONCE AS NEEDED
Status: COMPLETED | OUTPATIENT
Start: 2024-08-03 | End: 2024-08-03

## 2024-08-03 RX ORDER — LIDOCAINE HYDROCHLORIDE 20 MG/ML
1 JELLY TOPICAL 2 TIMES DAILY PRN
Status: CANCELLED | OUTPATIENT
Start: 2024-08-03

## 2024-08-03 RX ORDER — MAGNESIUM SULFATE HEPTAHYDRATE 40 MG/ML
2 INJECTION, SOLUTION INTRAVENOUS ONCE
Status: COMPLETED | OUTPATIENT
Start: 2024-08-03 | End: 2024-08-03

## 2024-08-03 RX ADMIN — LOSARTAN POTASSIUM 50 MG: 25 TABLET, FILM COATED ORAL at 08:08

## 2024-08-03 RX ADMIN — VANCOMYCIN HYDROCHLORIDE 1000 MG: 1 INJECTION, SOLUTION INTRAVENOUS at 00:13

## 2024-08-03 RX ADMIN — POLYETHYLENE GLYCOL 3350 17 G: 17 POWDER, FOR SOLUTION ORAL at 08:08

## 2024-08-03 RX ADMIN — CARVEDILOL 6.25 MG: 12.5 TABLET, FILM COATED ORAL at 20:16

## 2024-08-03 RX ADMIN — CEFAZOLIN SODIUM 1 G: 1 INJECTION, SOLUTION INTRAVENOUS at 20:16

## 2024-08-03 RX ADMIN — MAGNESIUM SULFATE HEPTAHYDRATE 2 G: 40 INJECTION, SOLUTION INTRAVENOUS at 11:38

## 2024-08-03 RX ADMIN — VANCOMYCIN HYDROCHLORIDE 500 MG: 500 INJECTION, SOLUTION INTRAVENOUS at 22:33

## 2024-08-03 RX ADMIN — SENNOSIDES AND DOCUSATE SODIUM 2 TABLET: 50; 8.6 TABLET ORAL at 08:08

## 2024-08-03 RX ADMIN — LEVOTHYROXINE, LIOTHYRONINE 90 MG: 38; 9 TABLET ORAL at 08:19

## 2024-08-03 RX ADMIN — CARVEDILOL 6.25 MG: 12.5 TABLET, FILM COATED ORAL at 08:17

## 2024-08-03 RX ADMIN — SODIUM BICARBONATE 100 ML/HR: 84 INJECTION, SOLUTION INTRAVENOUS at 22:33

## 2024-08-03 RX ADMIN — BISACODYL 10 MG: 10 SUPPOSITORY RECTAL at 11:30

## 2024-08-03 RX ADMIN — OXYCODONE HYDROCHLORIDE 5 MG: 5 TABLET ORAL at 23:06

## 2024-08-03 RX ADMIN — ACETAMINOPHEN 975 MG: 325 TABLET ORAL at 20:16

## 2024-08-03 RX ADMIN — SODIUM BICARBONATE 100 ML/HR: 84 INJECTION, SOLUTION INTRAVENOUS at 11:30

## 2024-08-03 RX ADMIN — PIPERACILLIN SODIUM AND TAZOBACTAM SODIUM 2.25 G: 2; .25 INJECTION, SOLUTION INTRAVENOUS at 02:44

## 2024-08-03 RX ADMIN — TORSEMIDE 20 MG: 20 TABLET ORAL at 11:50

## 2024-08-03 RX ADMIN — CEFAZOLIN SODIUM 1 G: 1 INJECTION, SOLUTION INTRAVENOUS at 11:32

## 2024-08-03 RX ADMIN — Medication 2000 UNITS: at 06:10

## 2024-08-03 RX ADMIN — ACETAMINOPHEN 650 MG: 325 TABLET ORAL at 04:25

## 2024-08-03 ASSESSMENT — COGNITIVE AND FUNCTIONAL STATUS - GENERAL
TOILETING: A LITTLE
CLIMB 3 TO 5 STEPS WITH RAILING: A LITTLE
DAILY ACTIVITIY SCORE: 23
MOBILITY SCORE: 23

## 2024-08-03 ASSESSMENT — PAIN SCALES - GENERAL
PAINLEVEL_OUTOF10: 8
PAINLEVEL_OUTOF10: 6
PAINLEVEL_OUTOF10: 8

## 2024-08-03 ASSESSMENT — PAIN - FUNCTIONAL ASSESSMENT: PAIN_FUNCTIONAL_ASSESSMENT: 0-10

## 2024-08-03 ASSESSMENT — PAIN DESCRIPTION - LOCATION: LOCATION: RECTUM

## 2024-08-03 ASSESSMENT — PAIN SCALES - WONG BAKER: WONGBAKER_NUMERICALRESPONSE: HURTS WHOLE LOT

## 2024-08-03 NOTE — PROGRESS NOTES
"Xin Fajardo \"Prisca\" is a 83 y.o. female who is hospital day 1 with PMH of ongoing extensive workup for 7 months of \"failure to thrive,\" remote colon + lung cancer, complex abdominal surgical hx, hemorrhoids, papillary thyroid cancer s/p thyroidectomy, chronic diastolic heart failure, mitral regurgitation (moderate per TTE 2022, HTN, HLD, stage IIIb CKD, osteoporosis, macular degeneration,  presenting s/p right cheek Mohs surgery (7/23) now with surgical site infection and severe constipation.    Subjective   Interval Events:   Overnight, labs came back w/ bicarb of 8 (had been 11 in June). Was given 500mL bolus of LR. Lactate of 0.9 and anion gap of 16. A&Ox4 at the time with no dyspnea and no acute distress.    This morning:   Seen resting in bed in no acute distress. Endorses continued constipation and increased abdominal pain. Denies issues with urination. States right shoulder has also been painful 2/2 to prior MSK injury. States the wound on the right cheek was painful earlier this morning, but is now not bothering her.    =========  Scheduled Medications  Scheduled medications  carvedilol, 6.25 mg, oral, BID  ceFAZolin, 1 g, intravenous, q12h  cholecalciferol, 2,000 Units, oral, Daily  heparin (porcine), 5,000 Units, subcutaneous, q8h  lidocaine, 5 mL, infiltration, Once  lidocaine, 5 mL, infiltration, Once  losartan, 50 mg, oral, Daily  magnesium sulfate, 2 g, intravenous, Once  polyethylene glycol, 17 g, oral, BID  sennosides-docusate sodium, 2 tablet, oral, BID  thyroid (pork), 90 mg, oral, Daily before breakfast  torsemide, 20 mg, oral, Daily      Continuous medications  sodium bicarbonate 150 mEq in dextrose 5% 1,150 mL infusion, 100 mL/hr, Last Rate: 100 mL/hr (08/03/24 1130)      PRN medications  PRN medications: acetaminophen **OR** [DISCONTINUED] acetaminophen **OR** [DISCONTINUED] acetaminophen, lidocaine, ondansetron **OR** ondansetron, phenyleph-min oil-petrolatum, vancomycin, witch " "henok    Continuous Medications  sodium bicarbonate 150 mEq in dextrose 5% 1,150 mL infusion, 100 mL/hr, Last Rate: 100 mL/hr (08/03/24 1130)        PRN Medications  PRN medications: acetaminophen **OR** [DISCONTINUED] acetaminophen **OR** [DISCONTINUED] acetaminophen, lidocaine, ondansetron **OR** ondansetron, phenyleph-min oil-petrolatum, vancomycin, witch hazel    Objective   Vital Signs  /74   Pulse 75   Temp 36.3 °C (97.3 °F)   Resp 18   Ht 1.473 m (4' 10\")   Wt (!) 43.1 kg (95 lb)   SpO2 100%   BMI 19.86 kg/m²     Physical Exam   GENERAL: laying in bed comfortably; well-appearing and in NAD.  EYES: no conjunctival injection, lesions, or scleral icterus.  ENT: no discharge or bleeding; moist mucous membranes; hearing intact to conversational tone.  HEART: RRR; audible S1 and S2; no extra heart sounds, murmurs, gallops, rubs, or knocks.  CHEST: CTAB; audible vesicular breath sounds throughout the bilateral lung fields. Breathing is even and unlabored. No wheezes, ronchi, rales.   ABDOMINAL: abdomen diffusely tender to palpation across all quadrants; mildly distended; no rebound tenderness, guarding  EXTREMITIES: BLE edema up to the mid calf; areas of ecchymosis appreciated on bilateral anterior calves; no tender to palpation  INTEGUMENT: warm, dry, elastic, and intact. Normal turgor.  PSYCHIATRIC/MSE: conversational; full euthymic affect; logical thought process; cognition intact.  NEUROLOGICAL: A&O to self, date, location. Language expression, naming, and repetition intact. Remains focused during interview.         Intake/Output:  - Not strict; 2x urine output    Recent/Relevant Labs:  Results for orders placed or performed during the hospital encounter of 08/02/24 (from the past 24 hour(s))   Renal function panel   Result Value Ref Range    Glucose 92 74 - 99 mg/dL    Sodium 139 136 - 145 mmol/L    Potassium 4.4 3.5 - 5.3 mmol/L    Chloride 119 (H) 98 - 107 mmol/L    Bicarbonate 8 (LL) 21 - 32 " mmol/L    Anion Gap 16 10 - 20 mmol/L    Urea Nitrogen 55 (H) 6 - 23 mg/dL    Creatinine 1.76 (H) 0.50 - 1.05 mg/dL    eGFR 28 (L) >60 mL/min/1.73m*2    Calcium 9.3 8.6 - 10.6 mg/dL    Phosphorus 4.3 2.5 - 4.9 mg/dL    Albumin 3.4 3.4 - 5.0 g/dL   CBC and Auto Differential   Result Value Ref Range    WBC 11.9 (H) 4.4 - 11.3 x10*3/uL    nRBC 0.0 0.0 - 0.0 /100 WBCs    RBC 2.69 (L) 4.00 - 5.20 x10*6/uL    Hemoglobin 8.6 (L) 12.0 - 16.0 g/dL    Hematocrit 28.7 (L) 36.0 - 46.0 %     (H) 80 - 100 fL    MCH 32.0 26.0 - 34.0 pg    MCHC 30.0 (L) 32.0 - 36.0 g/dL    RDW 13.9 11.5 - 14.5 %    Platelets 299 150 - 450 x10*3/uL    Neutrophils % 71.6 40.0 - 80.0 %    Immature Granulocytes %, Automated 0.7 0.0 - 0.9 %    Lymphocytes % 13.0 13.0 - 44.0 %    Monocytes % 11.6 2.0 - 10.0 %    Eosinophils % 2.7 0.0 - 6.0 %    Basophils % 0.4 0.0 - 2.0 %    Neutrophils Absolute 8.54 (H) 1.60 - 5.50 x10*3/uL    Immature Granulocytes Absolute, Automated 0.08 0.00 - 0.50 x10*3/uL    Lymphocytes Absolute 1.55 0.80 - 3.00 x10*3/uL    Monocytes Absolute 1.38 (H) 0.05 - 0.80 x10*3/uL    Eosinophils Absolute 0.32 0.00 - 0.40 x10*3/uL    Basophils Absolute 0.05 0.00 - 0.10 x10*3/uL   Magnesium   Result Value Ref Range    Magnesium 1.72 1.60 - 2.40 mg/dL   Ferritin   Result Value Ref Range    Ferritin 292 (H) 8 - 150 ng/mL   Iron and TIBC   Result Value Ref Range    Iron 42 35 - 150 ug/dL    UIBC 174 110 - 370 ug/dL    TIBC 216 (L) 240 - 445 ug/dL    % Saturation 19 (L) 25 - 45 %   Reticulocytes   Result Value Ref Range    Retic % 2.4 (H) 0.5 - 2.0 %    Retic Absolute 0.064 0.017 - 0.110 x10*6/uL    Reticulocyte Hemoglobin 34 28 - 38 pg    Immature Retic fraction 14.1 <=16.0 %   Lactate   Result Value Ref Range    Lactate 0.9 0.4 - 2.0 mmol/L   BLOOD GAS VENOUS FULL PANEL   Result Value Ref Range    POCT pH, Venous 7.19 (LL) 7.33 - 7.43 pH    POCT pCO2, Venous 25 (L) 41 - 51 mm Hg    POCT pO2, Venous 15 (L) 35 - 45 mm Hg    POCT SO2,  Venous 24 (L) 45 - 75 %    POCT Oxy Hemoglobin, Venous 24.1 (L) 45.0 - 75.0 %    POCT Hematocrit Calculated, Venous 31.0 (L) 36.0 - 46.0 %    POCT Sodium, Venous 139 136 - 145 mmol/L    POCT Potassium, Venous 3.8 3.5 - 5.3 mmol/L    POCT Chloride, Venous 116 (H) 98 - 107 mmol/L    POCT Ionized Calicum, Venous 1.40 (H) 1.10 - 1.33 mmol/L    POCT Glucose, Venous 150 (H) 74 - 99 mg/dL    POCT Lactate, Venous 0.9 0.4 - 2.0 mmol/L    POCT Base Excess, Venous -17.1 (L) -2.0 - 3.0 mmol/L    POCT HCO3 Calculated, Venous 9.5 (L) 22.0 - 26.0 mmol/L    POCT Hemoglobin, Venous 10.2 (L) 12.0 - 16.0 g/dL    POCT Anion Gap, Venous 17.0 10.0 - 25.0 mmol/L    Patient Temperature 37.0 degrees Celsius    FiO2 0 %   Vitamin B12   Result Value Ref Range    Vitamin B12 376 211 - 911 pg/mL   Folate   Result Value Ref Range    Folate, Serum 20.5 >5.0 ng/mL   CBC and Auto Differential   Result Value Ref Range    WBC 10.9 4.4 - 11.3 x10*3/uL    nRBC 0.0 0.0 - 0.0 /100 WBCs    RBC 2.51 (L) 4.00 - 5.20 x10*6/uL    Hemoglobin 8.2 (L) 12.0 - 16.0 g/dL    Hematocrit 26.9 (L) 36.0 - 46.0 %     (H) 80 - 100 fL    MCH 32.7 26.0 - 34.0 pg    MCHC 30.5 (L) 32.0 - 36.0 g/dL    RDW 13.9 11.5 - 14.5 %    Platelets 244 150 - 450 x10*3/uL    Neutrophils % 72.8 40.0 - 80.0 %    Immature Granulocytes %, Automated 0.5 0.0 - 0.9 %    Lymphocytes % 10.0 13.0 - 44.0 %    Monocytes % 12.1 2.0 - 10.0 %    Eosinophils % 4.1 0.0 - 6.0 %    Basophils % 0.5 0.0 - 2.0 %    Neutrophils Absolute 7.92 (H) 1.60 - 5.50 x10*3/uL    Immature Granulocytes Absolute, Automated 0.05 0.00 - 0.50 x10*3/uL    Lymphocytes Absolute 1.09 0.80 - 3.00 x10*3/uL    Monocytes Absolute 1.32 (H) 0.05 - 0.80 x10*3/uL    Eosinophils Absolute 0.45 (H) 0.00 - 0.40 x10*3/uL    Basophils Absolute 0.05 0.00 - 0.10 x10*3/uL   Renal Function Panel   Result Value Ref Range    Glucose 84 74 - 99 mg/dL    Sodium 139 136 - 145 mmol/L    Potassium 4.3 3.5 - 5.3 mmol/L    Chloride 118 (H) 98 - 107  "mmol/L    Bicarbonate 9 (LL) 21 - 32 mmol/L    Anion Gap 16 10 - 20 mmol/L    Urea Nitrogen 51 (H) 6 - 23 mg/dL    Creatinine 1.80 (H) 0.50 - 1.05 mg/dL    eGFR 27 (L) >60 mL/min/1.73m*2    Calcium 8.6 8.6 - 10.6 mg/dL    Phosphorus 3.7 2.5 - 4.9 mg/dL    Albumin 2.8 (L) 3.4 - 5.0 g/dL   Magnesium   Result Value Ref Range    Magnesium 1.54 (L) 1.60 - 2.40 mg/dL   Calcium, ionized   Result Value Ref Range    POCT Calcium, Ionized 1.40 (H) 1.1 - 1.33 mmol/L   Transferrin   Result Value Ref Range    Transferrin 152 (L) 200 - 360 mg/dL             BNP   Date/Time Value Ref Range Status   06/04/2024 01:37 PM 72 0 - 99 pg/mL Final     Recent/Relevant Imaging:  - extensive outpatient imaging for \"failure to thrive\" workup  - no imagining on current admission    Other Recent/Relevant Studies:  - 8/1 - wound culture gram positive cocci, clusters at 48 hrs    Assessment and Plan   Assessment   Xin Fajardo \"Prisca\" is a 83 y.o. female who is hospital day 1 with PMH of ongoing extensive workup for 7 months of \"failure to thrive,\" remote colon + lung cancer, complex abdominal surgical hx, hemorrhoids, papillary thyroid cancer s/p thyroidectomy, chronic diastolic heart failure, mitral regurgitation (moderate per TTE 2022, HTN, HLD, stage IIIb CKD, osteoporosis, macular degeneration,  presenting s/p right cheek Mohs surgery (7/23) now with surgical site infection and severe constipation.    Wound culture obtained in dermatology clinic 8/1. Will plan to follow-up results for abx decision making. Pending results, will treat with vanc/zosyn to cover for MRSA and possible pseudomonas given recent surgery.     Given complex abdominal surgical hx, currently suspect constipation is 2/2 to chronic colonic dilation, colonic adhesions, and possible gastroparesis i/s/o acute infection. Given increased abdominal pain from yesterday, will plan dulcolax suppository in addition to Miralax/senna. Will also give preparation H and Tucks for " "hemorrhoid pain. Will continue to monitor and escalate as required. Given benign abdominal exam, not currently worried about obstruction or need for urgent surgical intervention.     Problem-Based Plan   #surgical site infection  :: Mohs surgery on 7/23/24  :: purulence noted and doxycyline 100mg BID started on 7/30 for 7 days; pt stopped after 1.5 days  :: 8/1 wound culture gram positive cocci, clusters at 48hrs  Plan  - f/u wound cultures  - continue vancomycin  - change zosyn to ancef i/s/o reduced concern for pseudomonas   - de-escalate abx based on wound culture  - monitor erythema margins  - consult to wound care    #non-anion gap metabolic acidosis  #anemia  #stage IIIb CKD  #reported ~7 months increased fatigue, \"failure to thrive\"  :: likely 2/2 CKD w/ possible contribution from renal tubular acidosis likely 2/2 antibiotics vs other etiology  :: team told by daughter that 7 months ago, bicarb was normal  :: Bicarb - 11 (6/4) -> 11 (6/24) -> 8 (8/2) -> 9 (8/3)  :: lactate - 0.9 (8/2)  :: Hgb: 10.1 (6/7) -> 8.6 (8/2) -> 8.2 (8/3)  :: 8/3 - folate: 20.5; B12: 376  :: 8/3 - transferrin: 152 (low); TIBC: 216 (low); iron: 42 (normal: ) ; ferritin: 292 (high)  :: 8/3 - retic absolute: 0.065; retic %: 2.4; retic index 1.02 (hypoproliferation <2)  :: vBG (8/3) - CO2: 25; pH: 7.19  :: s/p 500mL LR bolus 8/2  :: 8/3 - no evidence of dyspnea or altered mental status on AM exam  Plan  - continue sodium bicarb 150 mEq in D5W at 100mL/hr  - continue to monitor bicarb and hemoglobin  - pending urine electrolytes, urinalysis, RFP, mag  - pending ECG  - consult to PT/OT    #constipation  :: Differential: chronic colonic dialtion vs colonic adhesions vs gastroparesis vs drug interaction from doxy  :: last bowel movement 7/30  Plan  - continue MiraLax  - start Senna  - one time dulcolax   - continue to monitor; escalate as needed     Chronic Conditions  #HTN: continue home carvedilol 6.25 mg BID; continue home losartan " 50mg daily  #hypothyroidism: continue home armour thyroid 90mg daily  #chronic diastolic heart failure: continue home torsemide 20mg daily  #right shoulder pain from prior injury: Lidocaine patch PRN (12 hr on, 12 hr off)     F: Replete PRN  E: Replete PRN  N: Regular Diet  DVT Ppx: Heparin SubQ  GI Ppx: N/A  Access/Lines: Midline  Abx: Vanc/Cefazolin  O2: None     Pain regimen: Tylenol 975mg PRN q4; for R shoulder - lidocaine patch; For hemorrhoids - Tucks QID PRN and Preparation H QID PRN  Nausea regimen: Zofran 4mg PRN q8  Bowel regimen: Senna / Miralax     Code status: Full Code  Emergency contact: Michelle Casarez (daughter) [phone]    Jenny Hauser, MSY4  Internal Medicine  [place] pager: [phone]

## 2024-08-03 NOTE — HOSPITAL COURSE
"Xin Fajardo \"Prisca\" is a 83 y.o. female with PMH of ongoing extensive workup for 7 months of \"failure to thrive,\" remote colon + lung cancer, complex abdominal surgical hx, hemorrhoids, papillary thyroid cancer s/p thyroidectomy, chronic diastolic heart failure, mitral regurgitation (moderate per TTE 2022, HTN, HLD, stage IIIb CKD, osteoporosis, macular degeneration,  presenting to the hospital s/p right cheek Mohs surgery (7/23) with surgical site infection and severe constipation.     Patient was admitted directly to the inpatient floor from her dermatology clinic on 8/2. Upon admission, she was started on vanc/zosyn, which was later narrowed to vanc/cefazolin and then further narrowed to cefazolin after wound cultures resulted. She was discharged on a two week course of Keflex since the start time of IV medications (8/2-8/16).    Initial lab work revealed a non-anion gap metabolic acidosis (bicarb: 8 drop from prior 6/24: 11) and anemia (Hgb: 8.2 drop from prior 6/7: 10.1). Lactate was normal at 0.9 reducing concern for lactic acidosis. Additionally, pt was afebrile, vitals were within normal limits, and pt A&Ox4 with no dyspnea or SOB. On 8/3, due to difficulty getting and maintaining peripheral access, midline access was obtained and patient was started on sodium bicarb 150mEq at 100mL/hr. Additionally, anemia workup revealed a picture predominantly consistent with anemia of chronic disease (transferrin: 152 (low); TIBC: 216 (low); iron: 42 (normal: ); ferritin: 292 (high); retic absolute: 0.065; retic %: 2.4; retic index 1.02 (hypoproliferation <2)). On 8/5, pt's Hgb was noted to be 7.2 and she was given a transfusion of one unit of blood after which her Hgb recovered to 9.3. Additionally, her bicarb was noted to begin to trend down after the bicarb drip was stopped (23->18) and so she was started on PO bicarb.    Patient was initially started on senna and Miralax for constipation. On 8/3, when " condition failed to improve, this was escalated to dulcolax suppository, which resolved the constipation. Patient continued to endorse stomach pain and early satiety. She was started on Mirtazapine 15mg nightly (higher than prior home dose), Famotidine 20mg daily, and Mylanta. This helped ameliorate her stomach pain.     On the evening of 8/5 into the morning of 8/6, patient began to experience left great toe swelling, erythema, and pain consistent with past episodes of gout flares. The pt was started on a prednisone taper (40mg on 8/6, 30mg on 8/7, 20mg on 8/8, 10mg on 8/9), which resolved symptoms.    To do:  Right cheek surgical site infection   Cephalexin 500mg TID through end of day 8/15  Outpatient follow-up with dermatology  Left great toe gout flare  Complete course of prednisone 20mg 8/8 and 10mg 8/9  Non-anion metabolic gap acidosis w/ low bicarb, anemia, Stage IIIb CKD  Outpatient follow-up with nephrology to be coordinated with PCP  Outpatient RFP and CBC to be followed by PCP and/or nephrology  Bilateral lower extremity edema  Torsemide was held in the acute setting 2/2 kidney injury  Follow-up with PCP on kidney function labs and edema to determine appropriate dosing regimen

## 2024-08-03 NOTE — DISCHARGE INSTRUCTIONS
"Dear Xin Fajardo \"Prisca\",    You were admitted to [facility] from 8/2 to 8/7 for a surgical site infection on your right cheek. You were given IV antibiotics and responded appropriately. You have been discharged on oral Keflex 500mg every 8 hours which you should take through Thursday 8/15.     Upon admission, your initial labs showed that you had low electrolyte levels--in particular low bicarbonate. We started you on an IV dose of bicarbonate and fluids and your levels normalized. We noted that your levels began to decrease again after we stopped the IV dose and so we started you on oral bicarbonate pills, which you should continue to take outpatient. Additionally, your labs revealed that your hemoglobin, which is a marker of the amount of red blood cells, was low and had been trending down for the prior few months. We gave you a transfusion of one unit of blood and your hemoglobin level increased. Your labs also demonstrated continued presence of your Stage IIIb chronic kidney disease, with slight worsening of your kidney function. To stop further harm to your kidneys, we stopped your ibuprofen, torsemide and tramadol. Please do not restart these medications. If you experience swelling in your legs, please contact your PCP to get new kidney function lab work and discuss potentially restarting your torsemide     When you were admitted to the hospital, you were experiencing constipation that was causing you abdominal pain. You were given Miralax, Senna, and Dolcolax, which resolved your constipation. You continued to state that you were experiencing stomach pain and were feeling full soon after starting to eat so we started you on famotidine to help settle your stomach and Mirtazapine at a higher dose than you had been prescribed at home to help stimulate your appetite.      On the evening of 8/5 into the morning of 8/6, you also began to experience pain, swelling, and " redness of your left great toe, which we believed to be a flare of your gout. We started you on a steroid taper, which resolved the inflammation in your toe (40mg on 8/6, 30mg on 8/7, 20mg on 8/8, 10mg on 8/9).     START taking these NEW medications:  Start taking cephalexin (Keflex) 500mg capsule 3 times per day through 8/15  Start taking prednisone 20mg on 8/8 and 10mg on 8/9  Start taking famotidine 20mg daily  Start using Preparation H 4 times per day as needed for hemorrhoid pain  Start taking Miralax 1 time per day as needed for constipation  Start taking sodium bicarbonate 650mg tables 2 times per day    CHANGE how you're taking these current medications:  Take Mirtazapine 15mg every night instead of 7.5mg     STOP taking these medications:  Stop taking doxycycline  Stop taking ibuprofen  Stop taking your torsemide and contact your PCP if you begin to have swelling in your legs  Stop taking tramadol-acetaminophen (Ultracet)    CONTINUE taking these medications:  [place] Thyroid 90mg tablet daily  Multivitamin  Carvedilol 6.25mg every 12 hours  Cholecalciferol 50 mcg (2,000 unit) capsule daily  Losartan 50mg daily  Denosumab 60mg/mL syringe under the skin every 6 months    Appointments/follow-up:  Dermatology - they will set up an appointment for you  Nephrology - you stated you will coordinate with your primary care physician, a referral has been placed for you  Please get repeat labs drawn within the next few days to monitor your blood counts and kidney function, your PCP will be able to see these and follow up on them    It was a pleasure taking care of you,  Your  Care Team

## 2024-08-03 NOTE — POST-PROCEDURE NOTE
Pre-Procedure Checklist:  Emergent Line Insertion: No  Type of Line to be Placed:Midline   Consent Obtained: Yes- Verbal   Emergency Medication Necessary: No  Patient Identified with 2 Independent Identifiers: Yes  Review of Allergies, Anticoagulation, Relevant Labs, ECG/Telemetry: Yes  Risks/Benefits/Alternatives Discussed with Patient/POA/Legal Representative: Yes  Stop Sign on Door: Yes  Time Out Performed: Yes  Catheter Exchange: No    Positioning Checklist:  All People, Including Patient, in the Room with Cap and Mask: Yes  Fluoroscopy Used to Identify Vessel and Guide Insertion: No   Sterile Cover Used: Yes  Full Barrier Precautions Followed (Mask, Cap, Gown, Gloves): Yes  Hands Washed: Yes  Monitors Attached with Sound Alarms On: No  Full Body Sterile Drape (Head-to-Toe) Used to Cover Patient: Yes  Trendelenburg Position (For IJ and Subclavian): No  CHG Skin Prep Used and Allowed to Air Dry to Skin Procedure: Yes    Procedure Checklist:  Blood Aspirated From All Lumens, All Ports Subsequently Flushed: Yes  Catheter Caps Placed on All Lumens; Lumens Clamped: Yes  Maintain Guidewire Control Throughout, Ensuring Guidewire Removal: Yes  Maintain Sterile Field Throughout Insertion: Yes  Catheter Secured: Yes  Confirmatory Test of Venous Placement: Non-Pulsatile Blood    Post Procedure Checklist:  Date and Time Written on Dressing: Yes  Sharp and Wire Count and Safe Disposal of all Sharps/Wires: Yes  Sterile Dressing Applied Per Protocol: Yes  X-ray Ordered or ECG Image: N/A    PICC Insertion Details:  Size (Fr): 3  Lumen Type: single   Catheter to Vein Ratio Less Than 50%: Yes  Total Length (cm): 15  External Length (cm): 1  Orientation: Left  Location: Basilic   Site Prep: Chlorohexidine; Usual sterile procedure followed  Local Anesthetic: Injectable/Subcutaneous  Indication: IV ABX- Poor Access   Insertion Team Members in the Room: NurseCoco LPN  Initial Extremity Circumference (cm): 28  Insertion Attempts:  1  Patient Tolerance: Tolerated Well, Age Appropriate  Comfort Measures: Subcutaneous anesthetic; Verbal  Procedure Location: Bedside  Safety Measures: Patient specific safety measures addressed with Bedside RN  Estimated Blood Loss (mL): 0  Vessel Fully Compressible Proximally and Distally to Insertion Site: Yes  Brisk Blood Return Obtained and Line Draws Easily: Yes  Tip Location: Axilla   Line Confirmation: Blood Return - Non Pulsatile Blood   Lot #: [id number]  : Bard  PICC Line Exp Date: 10/31/2025  Securement: Stat Lock  Post Procedure Checklist: Handoff with RN; Obtain all new IV tubing prior to use; Bed at lowest level and wheels locked; Line discharge information at bedside.  Additional Details: Line was inserted using Modified Seldinger's Technique.   Placed by: Krystyna Li RN

## 2024-08-03 NOTE — PROGRESS NOTES
Vancomycin Dosing by Pharmacy- INITIAL    Xin Fajardo is a 83 y.o. year old female who Pharmacy has been consulted for vancomycin dosing for cellulitis, skin and soft tissue. Based on the patient's indication and renal status this patient will be dosed based on a goal trough/random level of 15-20.     Renal function is currently stable. CrCl = 13.8    Visit Vitals  /66   Pulse 82   Temp 35.7 °C (96.3 °F) (Temporal)   Resp 18        Lab Results   Component Value Date    CREATININE 1.76 (H) 2024    CREATININE 1.54 (H) 2024    CREATININE 1.48 (H) 2024        Patient weight is as follows:   Vitals:    24 1538   Weight: (!) 43.1 kg (95 lb)       Cultures:  No results found for the encounter in last 14 days.        No intake/output data recorded.  I/O during current shift:  No intake/output data recorded.    Temp (24hrs), Av.3 °C (97.3 °F), Min:35.7 °C (96.3 °F), Max:36.8 °C (98.3 °F)         Assessment/Plan     Patient will not be given a loading dose.  Will initiate vancomycin maintenance, a one time dose of 1000 mg.     Follow-up level will be ordered on 8/3 at 21:00 unless clinically indicated sooner.  Will continue to monitor renal function daily while on vancomycin and order serum creatinine at least every 48 hours if not already ordered.  Follow for continued vancomycin needs, clinical response, and signs/symptoms of toxicity.       aMry Do [facility]

## 2024-08-03 NOTE — SIGNIFICANT EVENT
Midline Note    Patient ok to receive midline even with low GFR < 35 - requires access for management of significant metabolic acidosis and for IV antibiotics.    Korin Benavidez MD  Internal Medicine and Pediatrics, PGY-2  [place]

## 2024-08-04 VITALS
BODY MASS INDEX: 19.94 KG/M2 | SYSTOLIC BLOOD PRESSURE: 101 MMHG | WEIGHT: 95 LBS | HEIGHT: 58 IN | DIASTOLIC BLOOD PRESSURE: 57 MMHG | TEMPERATURE: 97.7 F | RESPIRATION RATE: 16 BRPM | OXYGEN SATURATION: 99 % | HEART RATE: 83 BPM

## 2024-08-04 LAB
ALBUMIN SERPL BCP-MCNC: 2.5 G/DL (ref 3.4–5)
ANION GAP SERPL CALC-SCNC: 12 MMOL/L (ref 10–20)
B BURGDOR.VLSE1+PEPC10 AB SER IA-ACNC: 0.12 IV
BACTERIA SPEC CULT: ABNORMAL
BACTERIA SPEC CULT: ABNORMAL
BASOPHILS # BLD AUTO: 0.04 X10*3/UL (ref 0–0.1)
BASOPHILS NFR BLD AUTO: 0.5 %
BUN SERPL-MCNC: 44 MG/DL (ref 6–23)
CALCIUM SERPL-MCNC: 7.7 MG/DL (ref 8.6–10.6)
CHLORIDE SERPL-SCNC: 108 MMOL/L (ref 98–107)
CO2 SERPL-SCNC: 23 MMOL/L (ref 21–32)
CREAT SERPL-MCNC: 1.72 MG/DL (ref 0.5–1.05)
EGFRCR SERPLBLD CKD-EPI 2021: 29 ML/MIN/1.73M*2
EOSINOPHIL # BLD AUTO: 0.5 X10*3/UL (ref 0–0.4)
EOSINOPHIL NFR BLD AUTO: 5.7 %
ERYTHROCYTE [DISTWIDTH] IN BLOOD BY AUTOMATED COUNT: 13.6 % (ref 11.5–14.5)
GLUCOSE SERPL-MCNC: 93 MG/DL (ref 74–99)
GRAM STN SPEC: ABNORMAL
GRAM STN SPEC: ABNORMAL
HCT VFR BLD AUTO: 22.9 % (ref 36–46)
HGB BLD-MCNC: 7.3 G/DL (ref 12–16)
IMM GRANULOCYTES # BLD AUTO: 0.06 X10*3/UL (ref 0–0.5)
IMM GRANULOCYTES NFR BLD AUTO: 0.7 % (ref 0–0.9)
LYMPHOCYTES # BLD AUTO: 1.82 X10*3/UL (ref 0.8–3)
LYMPHOCYTES NFR BLD AUTO: 20.6 %
MAGNESIUM SERPL-MCNC: 2.07 MG/DL (ref 1.6–2.4)
MCH RBC QN AUTO: 32.4 PG (ref 26–34)
MCHC RBC AUTO-ENTMCNC: 31.9 G/DL (ref 32–36)
MCV RBC AUTO: 102 FL (ref 80–100)
MONOCYTES # BLD AUTO: 1.24 X10*3/UL (ref 0.05–0.8)
MONOCYTES NFR BLD AUTO: 14 %
NEUTROPHILS # BLD AUTO: 5.18 X10*3/UL (ref 1.6–5.5)
NEUTROPHILS NFR BLD AUTO: 58.5 %
NRBC BLD-RTO: 0 /100 WBCS (ref 0–0)
PHOSPHATE SERPL-MCNC: 3.8 MG/DL (ref 2.5–4.9)
PLATELET # BLD AUTO: 235 X10*3/UL (ref 150–450)
POTASSIUM SERPL-SCNC: 3.1 MMOL/L (ref 3.5–5.3)
RBC # BLD AUTO: 2.25 X10*6/UL (ref 4–5.2)
SODIUM SERPL-SCNC: 140 MMOL/L (ref 136–145)
WBC # BLD AUTO: 8.8 X10*3/UL (ref 4.4–11.3)

## 2024-08-04 PROCEDURE — 83735 ASSAY OF MAGNESIUM: CPT

## 2024-08-04 PROCEDURE — 85025 COMPLETE CBC W/AUTO DIFF WBC: CPT

## 2024-08-04 PROCEDURE — 2500000004 HC RX 250 GENERAL PHARMACY W/ HCPCS (ALT 636 FOR OP/ED): Mod: JZ

## 2024-08-04 PROCEDURE — 2500000001 HC RX 250 WO HCPCS SELF ADMINISTERED DRUGS (ALT 637 FOR MEDICARE OP)

## 2024-08-04 PROCEDURE — 1100000001 HC PRIVATE ROOM DAILY

## 2024-08-04 PROCEDURE — 2500000002 HC RX 250 W HCPCS SELF ADMINISTERED DRUGS (ALT 637 FOR MEDICARE OP, ALT 636 FOR OP/ED)

## 2024-08-04 PROCEDURE — 99232 SBSQ HOSP IP/OBS MODERATE 35: CPT

## 2024-08-04 PROCEDURE — 2500000005 HC RX 250 GENERAL PHARMACY W/O HCPCS

## 2024-08-04 PROCEDURE — 80069 RENAL FUNCTION PANEL: CPT

## 2024-08-04 RX ORDER — POTASSIUM CHLORIDE 20 MEQ/1
40 TABLET, EXTENDED RELEASE ORAL ONCE
Status: COMPLETED | OUTPATIENT
Start: 2024-08-04 | End: 2024-08-04

## 2024-08-04 RX ORDER — OXYCODONE HYDROCHLORIDE 5 MG/1
5 TABLET ORAL ONCE
Status: COMPLETED | OUTPATIENT
Start: 2024-08-04 | End: 2024-08-04

## 2024-08-04 RX ADMIN — ACETAMINOPHEN 975 MG: 325 TABLET ORAL at 09:26

## 2024-08-04 RX ADMIN — CARVEDILOL 6.25 MG: 12.5 TABLET, FILM COATED ORAL at 09:00

## 2024-08-04 RX ADMIN — OXYCODONE HYDROCHLORIDE 5 MG: 5 TABLET ORAL at 11:19

## 2024-08-04 RX ADMIN — WITCH HAZEL 1 EACH: 500 SOLUTION RECTAL; TOPICAL at 16:32

## 2024-08-04 RX ADMIN — Medication 2000 UNITS: at 09:00

## 2024-08-04 RX ADMIN — LIDOCAINE 1 PATCH: 4 PATCH TOPICAL at 16:32

## 2024-08-04 RX ADMIN — MINERAL OIL, PETROLATUM, PHENYLEPHRINE HCL: 2.5; 140; 749 OINTMENT TOPICAL at 16:32

## 2024-08-04 RX ADMIN — CARVEDILOL 6.25 MG: 12.5 TABLET, FILM COATED ORAL at 20:29

## 2024-08-04 RX ADMIN — TORSEMIDE 20 MG: 20 TABLET ORAL at 09:00

## 2024-08-04 RX ADMIN — CEFAZOLIN SODIUM 1 G: 1 INJECTION, SOLUTION INTRAVENOUS at 20:29

## 2024-08-04 RX ADMIN — LOSARTAN POTASSIUM 50 MG: 25 TABLET, FILM COATED ORAL at 09:00

## 2024-08-04 RX ADMIN — LEVOTHYROXINE, LIOTHYRONINE 90 MG: 38; 9 TABLET ORAL at 06:39

## 2024-08-04 RX ADMIN — CEFAZOLIN SODIUM 1 G: 1 INJECTION, SOLUTION INTRAVENOUS at 09:00

## 2024-08-04 RX ADMIN — POTASSIUM CHLORIDE 40 MEQ: 1500 TABLET, EXTENDED RELEASE ORAL at 09:00

## 2024-08-04 RX ADMIN — ACETAMINOPHEN 975 MG: 325 TABLET ORAL at 16:32

## 2024-08-04 RX ADMIN — SENNOSIDES AND DOCUSATE SODIUM 2 TABLET: 50; 8.6 TABLET ORAL at 09:00

## 2024-08-04 ASSESSMENT — COGNITIVE AND FUNCTIONAL STATUS - GENERAL
DAILY ACTIVITIY SCORE: 23
TOILETING: A LITTLE
CLIMB 3 TO 5 STEPS WITH RAILING: A LITTLE
MOBILITY SCORE: 23

## 2024-08-04 ASSESSMENT — PAIN SCALES - GENERAL
PAINLEVEL_OUTOF10: 3
PAINLEVEL_OUTOF10: 8
PAINLEVEL_OUTOF10: 0 - NO PAIN

## 2024-08-04 ASSESSMENT — PAIN DESCRIPTION - ORIENTATION: ORIENTATION: RIGHT

## 2024-08-04 ASSESSMENT — PAIN DESCRIPTION - LOCATION: LOCATION: FACE

## 2024-08-04 NOTE — PROGRESS NOTES
Vancomycin Dosing by Pharmacy- FOLLOW UP    Xin Fajardo is a 83 y.o. year old female who Pharmacy has been consulted for vancomycin dosing for cellulitis, skin and soft tissue. Based on the patient's indication and renal status this patient is being dosed based on a goal trough/random level of 15-20.     Renal function is currently declining.    Current vancomycin dose: dose by level. Last dose was 1gm X1 on 8/3     Most recent trough level: 12 mcg/mL    Visit Vitals  /66   Pulse 83   Temp 36.8 °C (98.2 °F)   Resp 18        Lab Results   Component Value Date    CREATININE 1.78 (H) 2024    CREATININE 1.80 (H) 2024    CREATININE 1.76 (H) 2024    CREATININE 1.54 (H) 2024        Patient weight is as follows:   Vitals:    24 1538   Weight: (!) 43.1 kg (95 lb)       Cultures:  No results found for the encounter in last 14 days.       I/O last 3 completed shifts:  In: 418.3 (9.2 mL/kg) [P.O.:120; I.V.:298.3 (6.6 mL/kg)]  Out: 400 (8.8 mL/kg) [Urine:400 (0.2 mL/kg/hr)]  Dosing Weight: 45.4 kg   I/O during current shift:  I/O this shift:  In: 100 [IV Piggyback:100]  Out: -     Temp (24hrs), Av.5 °C (97.7 °F), Min:36.3 °C (97.3 °F), Max:36.8 °C (98.2 °F)      Assessment/Plan    Within goal random/trough level Will order 500mg X1 dose now.     The next level will be obtained on 8/3 at PM lab draw. May be obtained sooner if clinically indicated.   Will continue to monitor renal function daily while on vancomycin and order serum creatinine at least every 48 hours if not already ordered.  Follow for continued vancomycin needs, clinical response, and signs/symptoms of toxicity.       Silverio Calhoun, PharmD

## 2024-08-04 NOTE — NURSING NOTE
[id number]    PRN tylenol given x1  PRN witch hazel pads given x1  PRN preparation H given x1  PRN lidocaine given x1  L) midline  SCDs    VSS on RA  AOx4   Full code  Up - SBA  Diet - regular  BG - n/a  No PIV

## 2024-08-04 NOTE — CARE PLAN
The patient's goals for the shift include      The clinical goals for the shift include pt joe be free from falls by end of shift.      Problem: Skin  Goal: Decreased wound size/increased tissue granulation at next dressing change  Outcome: Progressing  Goal: Participates in plan/prevention/treatment measures  Outcome: Progressing  Goal: Prevent/manage excess moisture  Outcome: Progressing  Goal: Prevent/minimize sheer/friction injuries  Outcome: Progressing  Goal: Promote/optimize nutrition  Outcome: Progressing  Goal: Promote skin healing  Outcome: Progressing     Problem: Infection related to problem list condition  Goal: Infection will resolve through treatment  Outcome: Progressing     Problem: Pain  Goal: Takes deep breaths with improved pain control throughout the shift  Outcome: Progressing  Goal: Walks with improved pain control throughout the shift  Outcome: Progressing  Goal: Performs ADL's with improved pain control throughout shift  Outcome: Progressing     Problem: Pain - Adult  Goal: Verbalizes/displays adequate comfort level or baseline comfort level  Outcome: Progressing     Problem: Safety - Adult  Goal: Free from fall injury  Outcome: Progressing     Problem: Discharge Planning  Goal: Discharge to home or other facility with appropriate resources  Outcome: Progressing

## 2024-08-04 NOTE — PROGRESS NOTES
Prisca Fajardo is a 83 y.o. female on day 2 of admission presenting with Surgical wound infection.      Subjective   No acute events overnight. Patient complained of some sacral pain overnight and was given 5 mg oxycodone 1x with improvement.  This morning, patient is denying any new pain, SOB, chest pain, constipation. She has been having bowel movements.        Objective     Last Recorded Vitals  /68   Pulse 74   Temp 36.3 °C (97.3 °F)   Resp 18   Wt (!) 43.1 kg (95 lb)   SpO2 100%   Intake/Output last 3 Shifts:    Intake/Output Summary (Last 24 hours) at 8/4/2024 0842  Last data filed at 8/4/2024 0558  Gross per 24 hour   Intake 2166.67 ml   Output 750 ml   Net 1416.67 ml       Admission Weight  Weight: (!) 43.1 kg (95 lb) (08/02/24 1538)    Daily Weight  08/02/24 : (!) 43.1 kg (95 lb)    Image Results  Bedside Midline Imaging  These images are not reportable by radiology and will not be interpreted   by  Radiologists.      Physical Exam  GENERAL: laying in bed comfortably; well-appearing and in NAD.  EYES: no conjunctival injection, lesions, or scleral icterus.  HEART: RRR; audible S1 and S2; no extra heart sounds, murmurs  CHEST: CTAB; audible vesicular breath sounds throughout the bilateral lung fields. Breathing is even and unlabored. No wheezes, ronchi, rales.   ABDOMINAL: abdomen diffusely tender to palpation across all quadrants; mildly distended; no rebound tenderness, guarding  EXTREMITIES: BLE edema up to the mid calf; areas of ecchymosis appreciated on bilateral anterior calves; no tender to palpation  Skin: See image below right facial wound. Improving erythema, still wound dehiscence with purulent fluid present.   PSYCHIATRIC/MSE: conversational; full euthymic affect; logical thought process; cognition intact.  NEUROLOGICAL: A&O to self, date, location. Language expression, naming, and repetition intact. Remains focused during interview.      Relevant Results    Scheduled  medications  carvedilol, 6.25 mg, oral, BID  ceFAZolin, 1 g, intravenous, q12h  cholecalciferol, 2,000 Units, oral, Daily  heparin (porcine), 5,000 Units, subcutaneous, q8h  lidocaine, 5 mL, infiltration, Once  lidocaine, 5 mL, infiltration, Once  losartan, 50 mg, oral, Daily  polyethylene glycol, 17 g, oral, BID  sennosides-docusate sodium, 2 tablet, oral, BID  thyroid (pork), 90 mg, oral, Daily before breakfast  torsemide, 20 mg, oral, Daily      Continuous medications     PRN medications  PRN medications: acetaminophen **OR** [DISCONTINUED] acetaminophen **OR** [DISCONTINUED] acetaminophen, lidocaine, melatonin, ondansetron **OR** ondansetron, phenyleph-min oil-petrolatum, vancomycin, witch hazel    Results for orders placed or performed during the hospital encounter of 08/02/24 (from the past 24 hour(s))   Comprehensive Metabolic Panel   Result Value Ref Range    Glucose 138 (H) 74 - 99 mg/dL    Sodium 138 136 - 145 mmol/L    Potassium 3.5 3.5 - 5.3 mmol/L    Chloride 113 (H) 98 - 107 mmol/L    Bicarbonate 14 (L) 21 - 32 mmol/L    Anion Gap 15 10 - 20 mmol/L    Urea Nitrogen 43 (H) 6 - 23 mg/dL    Creatinine 1.78 (H) 0.50 - 1.05 mg/dL    eGFR 28 (L) >60 mL/min/1.73m*2    Calcium 8.4 (L) 8.6 - 10.6 mg/dL    Albumin 2.7 (L) 3.4 - 5.0 g/dL    Alkaline Phosphatase 41 33 - 136 U/L    Total Protein 4.6 (L) 6.4 - 8.2 g/dL    AST 14 9 - 39 U/L    Bilirubin, Total 0.2 0.0 - 1.2 mg/dL    ALT 8 7 - 45 U/L   Magnesium   Result Value Ref Range    Magnesium 2.60 (H) 1.60 - 2.40 mg/dL   Vancomycin   Result Value Ref Range    Vancomycin 12.0 5.0 - 20.0 ug/mL   Urine electrolytes   Result Value Ref Range    Sodium, Urine Random 81 mmol/L    Sodium/Creatinine Ratio 277 Not established. mmol/g Creat    Potassium, Urine Random 28 mmol/L    Potassium/Creatinine Ratio 96 Not established mmol/g Creat    Chloride, Urine Random 102 mmol/L    Chloride/Creatinine Ratio 349 (H) 38 - 318 mmol/g creat    Creatinine, Urine Random 29.2 20.0 -  320.0 mg/dL   Urinalysis with Reflex Microscopic   Result Value Ref Range    Color, Urine Light-Yellow Light-Yellow, Yellow, Dark-Yellow    Appearance, Urine Turbid (N) Clear    Specific Gravity, Urine 1.010 1.005 - 1.035    pH, Urine 5.0 5.0, 5.5, 6.0, 6.5, 7.0, 7.5, 8.0    Protein, Urine NEGATIVE NEGATIVE, 10 (TRACE), 20 (TRACE) mg/dL    Glucose, Urine Normal Normal mg/dL    Blood, Urine 0.2 (2+) (A) NEGATIVE    Ketones, Urine NEGATIVE NEGATIVE mg/dL    Bilirubin, Urine NEGATIVE NEGATIVE    Urobilinogen, Urine Normal Normal mg/dL    Nitrite, Urine NEGATIVE NEGATIVE    Leukocyte Esterase, Urine 500 Leta/µL (A) NEGATIVE   Microscopic Only, Urine   Result Value Ref Range    WBC, Urine >50 (A) 1-5, NONE /HPF    WBC Clumps, Urine OCCASIONAL Reference range not established. /HPF    RBC, Urine >20 (A) NONE, 1-2, 3-5 /HPF    Mucus, Urine FEW Reference range not established. /LPF   CBC and Auto Differential   Result Value Ref Range    WBC 8.8 4.4 - 11.3 x10*3/uL    nRBC 0.0 0.0 - 0.0 /100 WBCs    RBC 2.25 (L) 4.00 - 5.20 x10*6/uL    Hemoglobin 7.3 (L) 12.0 - 16.0 g/dL    Hematocrit 22.9 (L) 36.0 - 46.0 %     (H) 80 - 100 fL    MCH 32.4 26.0 - 34.0 pg    MCHC 31.9 (L) 32.0 - 36.0 g/dL    RDW 13.6 11.5 - 14.5 %    Platelets 235 150 - 450 x10*3/uL    Neutrophils % 58.5 40.0 - 80.0 %    Immature Granulocytes %, Automated 0.7 0.0 - 0.9 %    Lymphocytes % 20.6 13.0 - 44.0 %    Monocytes % 14.0 2.0 - 10.0 %    Eosinophils % 5.7 0.0 - 6.0 %    Basophils % 0.5 0.0 - 2.0 %    Neutrophils Absolute 5.18 1.60 - 5.50 x10*3/uL    Immature Granulocytes Absolute, Automated 0.06 0.00 - 0.50 x10*3/uL    Lymphocytes Absolute 1.82 0.80 - 3.00 x10*3/uL    Monocytes Absolute 1.24 (H) 0.05 - 0.80 x10*3/uL    Eosinophils Absolute 0.50 (H) 0.00 - 0.40 x10*3/uL    Basophils Absolute 0.04 0.00 - 0.10 x10*3/uL   Renal Function Panel   Result Value Ref Range    Glucose 93 74 - 99 mg/dL    Sodium 140 136 - 145 mmol/L    Potassium 3.1 (L) 3.5 - 5.3  mmol/L    Chloride 108 (H) 98 - 107 mmol/L    Bicarbonate 23 21 - 32 mmol/L    Anion Gap 12 10 - 20 mmol/L    Urea Nitrogen 44 (H) 6 - 23 mg/dL    Creatinine 1.72 (H) 0.50 - 1.05 mg/dL    eGFR 29 (L) >60 mL/min/1.73m*2    Calcium 7.7 (L) 8.6 - 10.6 mg/dL    Phosphorus 3.8 2.5 - 4.9 mg/dL    Albumin 2.5 (L) 3.4 - 5.0 g/dL   Magnesium   Result Value Ref Range    Magnesium 2.07 1.60 - 2.40 mg/dL       Bedside Midline Imaging    Result Date: 8/3/2024  These images are not reportable by radiology and will not be interpreted by  Radiologists.    FL less than 1 hour    Result Date: 7/8/2024  These images are not reportable by radiology and will not be interpreted by  Radiologists.    Bronchoscopy Tier 1; w BAL, w Transbronch Bx    Result Date: 7/8/2024  Table formatting from the original result was not included. Impression The trachea, main micky, left lung and right lung appeared normal. Abnormal mucosa in the LLL Bronchoalveolar lavage was performed x1 in the RLL; the fluid appeared cloudy Performed transbronchial biopsies in the RLL, sent sample for histology analysis; hemostasis achieved Findings The trachea, main micky, left lung and right lung appeared normal. Generalized abnormal mucosa in the LLL. Prior lobectomy site- anastomosis normal in appearance. Bronchoalveolar lavage was performed in the RLL; the fluid appeared cloudy Performed 7 transbronchial biopsies with forceps using fluoroscopic guidance in the RLL, sent sample for histology analysis; hemostasis achieved. Hemostasis maintained and care turned over to general anesthesia. Recommendation  Follow up with referring physician  Follow up with Dr. Tam hardy of next week for results.   Indication History of malignant neoplasm of colon, Lung infiltrate Staff Staff Role Liz MCCULLOUGH MD Proceduralist Medications See Anesthesia Record. Preprocedure A history and physical has been performed, and patient medication allergies have been reviewed. The  patient's tolerance of previous anesthesia has been reviewed. The risks and benefits of the procedure and the sedation options and risks were discussed with the patient. All questions were answered and informed consent obtained. Details of the Procedure The patient underwent general anesthesia, which was administered by an anesthesia professional. The patient's blood pressure, heart rate, level of consciousness, oxygen, respirations, ECG and ETCO2 were monitored throughout the procedure. The patient's estimated blood loss was minimal (<5 mL). The scope was introduced through the endotracheal tube. The procedure was not difficult. The patient tolerated the procedure well. There were no apparent adverse events. Events Procedure Events Event Event Time ENDO SCOPE IN TIME 7/8/2024  2:34 PM ENDO SCOPE OUT TIME 7/8/2024  2:56 PM Specimens ID Type Source Tests Collected by Time 1 : RLL Fluid BAL CD4/8 PANEL, AFB CULTURE/SMEAR, FUNGAL CULTURE/SMEAR, RESPIRATORY CULTURE/SMEAR Dunia Lozada RN 7/8/2024 1441 2 : RLL BAL Non-Gynecologic Cytology BRONCHO-ALVEOLAR LAVAGE OF RIGHT LOWER LOBE CYTOLOGY CONSULTATION (NON-GYNECOLOGIC) Dunia Lozada RN 7/8/2024 1441 3 : RLL Tissue TRANSBRONCHIAL BIOPSY SURGICAL PATHOLOGY EXAM Dunia Lozada RN 7/8/2024 1446 A : RLL Body Fluid BAL/Bronchial [place] HISTOPLASMA ANTIGEN, NON BLOOD Dunia Lozada RN 7/8/2024 1440 B : RLL Bronchoalveolar Lavage Bronchial Wash BODY FLUID CELL COUNT WITH DIFFERENTIAL Dunia Lozada RN 7/8/2024 1441 C : RLL Respiratory BAL ASPERGILLUS GALACTOMANNAN EIA (NON-BLOOD SPECIMEN), BAL VIRAL PANEL PCR, FUNGITELL BETA-D GLUCAN PCR QUANT (NON-BLOOD SPECIMEN), LEGIONELLA PCR PANEL, PNEUMOCYSTIS JIROVECI PCR QUANTITATIVE (NON-BLOOD SPECIMEN) Dunia Lozada RN 7/8/2024 1441 D : ADD ON for RLL BAL Respiratory BAL HSV PCR QUANTITATIVE (NON-CSF, NON-BLOOD SPECIMEN) Dunia Lozada RN 7/8/2024 1440 Procedure Location [facility]  [facility] [address] [facility] [id number] [phone] Referring Provider Ha Lazo MD Procedure Provider Liz MCCULLOUGH MD                   Assessment/Plan        Principal Problem:    Surgical wound infection  Active Problems:    Cellulitis    Facial cellulitis    Failure to thrive in adult    Other constipation    Hypothyroidism due to Hashimoto's thyroiditis    Metabolic acidosis    Acute kidney injury superimposed on chronic kidney disease (CMS-HCC)    Xin Fajardo \"Prisca\" is a 83 y.o. female who is hospital day 1 with PMH of ongoing extensive workup for 7 months of \"failure to thrive,\" remote colon + lung cancer, complex abdominal surgical hx, hemorrhoids, papillary thyroid cancer s/p thyroidectomy, chronic diastolic heart failure, mitral regurgitation (moderate per TTE 2022, HTN, HLD, stage IIIb CKD, osteoporosis, macular degeneration,  presenting s/p right cheek Mohs surgery (7/23) now with surgical site infection and severe constipation.     Wound culture obtained in dermatology clinic 8/1. Will plan to follow-up results for abx decision making. Pending results, will treat with vanc/zosyn to cover for MRSA and possible pseudomonas given recent surgery.     Given complex abdominal surgical hx, currently suspect constipation is 2/2 to chronic colonic dilation, colonic adhesions, and possible gastroparesis i/s/o acute infection. Given increased abdominal pain from yesterday, will plan dulcolax suppository in addition to Miralax/senna. Will also give preparation H and Tucks for hemorrhoid pain. Will continue to monitor and escalate as required. Given benign abdominal exam, not currently worried about obstruction or need for urgent surgical intervention.      Updates 8/4/24:  - Continue to monitor facial wound, improved erythema but still wound dehiscence and purulent drainage, no current pain  - Improvement in bicarb, will stop bicarb gtt today and continue " "to monitor  - Fu wound 8/1/24 growing staph aureus - culture susceptibilities     Problem-Based Plan   #surgical site infection  :: Mohs surgery on 7/23/24  :: purulence noted and doxycyline 100mg BID started on 7/30 for 7 days; pt stopped after 1.5 days  :: 8/1 wound culture staph aureus - susceptibilities pending  Plan  - f/u wound cultures  - s/p zosyn 8/2-8/3  - continue vancomycin (8/2-**)  - Continue Cefazolin 8/3-**  - de-escalate abx based on wound culture  - monitor erythema margins  - consult to wound care     #non-anion gap metabolic acidosis  #anemia  #stage IIIb CKD  #reported ~7 months increased fatigue, \"failure to thrive\"  :: likely 2/2 CKD w/ possible contribution from renal tubular acidosis likely 2/2 antibiotics vs other etiology  :: team told by daughter that 7 months ago, bicarb was normal  :: Bicarb - 11 (6/4) -> 11 (6/24) -> 8 (8/2) -> 9 (8/3)  :: lactate - 0.9 (8/2)  :: Hgb: 10.1 (6/7) -> 8.6 (8/2) -> 8.2 (8/3)  :: 8/3 - folate: 20.5; B12: 376  :: 8/3 - transferrin: 152 (low); TIBC: 216 (low); iron: 42 (normal: ) ; ferritin: 292 (high)  :: 8/3 - retic absolute: 0.065; retic %: 2.4; retic index 1.02 (hypoproliferation <2)  :: vBG (8/3) - CO2: 25; pH: 7.19  :: s/p 500mL LR bolus 8/2  :: 8/3 - no evidence of dyspnea or altered mental status on AM exam  Plan  - Stop sodium bicarb 150 mEq in D5W at 100mL/hr  - continue to monitor bicarb and hemoglobin  - pending urine electrolytes, urinalysis, RFP, mag  - pending ECG  - consult to PT/OT     #constipation  :: Differential: chronic colonic dialtion vs colonic adhesions vs gastroparesis vs drug interaction from doxy  :: last bowel movement 7/30  Plan  - continue MiraLax  - start Senna  - one time dulcolax   - continue to monitor; escalate as needed     Chronic Conditions  #HTN: continue home carvedilol 6.25 mg BID; continue home losartan 50mg daily  #hypothyroidism: continue home armour thyroid 90mg daily  #chronic diastolic heart failure: " continue home torsemide 20mg daily  #right shoulder pain from prior injury: Lidocaine patch PRN (12 hr on, 12 hr off)     F: Replete PRN  E: Replete PRN  N: Regular Diet  DVT Ppx: Heparin SubQ  GI Ppx: N/A  Access/Lines: Midline  Abx: Vanc/Cefazolin  O2: None     Pain regimen: Tylenol 975mg PRN q4; for R shoulder - lidocaine patch; For hemorrhoids - Tucks QID PRN and Preparation H QID PRN  Nausea regimen: Zofran 4mg PRN q8  Bowel regimen: Senna / Miralax     Code status: Full Code  Emergency contact: Michelle Casarez (daughter) [phone]              Rosie Stoddard MD

## 2024-08-04 NOTE — PROGRESS NOTES
Vancomycin Dosing by Pharmacy- Cessation of Therapy    Consult to pharmacy for vancomycin dosing has been discontinued by the prescriber, pharmacy will sign off at this time.    Please call pharmacy if there are further questions or re-enter a consult if vancomycin is resumed.     Billy Fernandez, PharmD

## 2024-08-05 PROBLEM — D64.9 SYMPTOMATIC ANEMIA: Status: ACTIVE | Noted: 2024-08-05

## 2024-08-05 PROBLEM — E43 SEVERE PROTEIN-CALORIE MALNUTRITION (MULTI): Status: ACTIVE | Noted: 2024-08-05

## 2024-08-05 LAB
ALBUMIN SERPL BCP-MCNC: 2.6 G/DL (ref 3.4–5)
ANION GAP SERPL CALC-SCNC: 12 MMOL/L (ref 10–20)
BASOPHILS # BLD AUTO: 0.04 X10*3/UL (ref 0–0.1)
BASOPHILS NFR BLD AUTO: 0.4 %
BLOOD EXPIRATION DATE: NORMAL
BUN SERPL-MCNC: 47 MG/DL (ref 6–23)
CALCIUM SERPL-MCNC: 7.9 MG/DL (ref 8.6–10.6)
CHLORIDE SERPL-SCNC: 113 MMOL/L (ref 98–107)
CO2 SERPL-SCNC: 22 MMOL/L (ref 21–32)
CREAT SERPL-MCNC: 2.01 MG/DL (ref 0.5–1.05)
CREAT UR-MCNC: 28.2 MG/DL (ref 20–320)
DISPENSE STATUS: NORMAL
EGFRCR SERPLBLD CKD-EPI 2021: 24 ML/MIN/1.73M*2
EOSINOPHIL # BLD AUTO: 0.5 X10*3/UL (ref 0–0.4)
EOSINOPHIL NFR BLD AUTO: 5.1 %
ERYTHROCYTE [DISTWIDTH] IN BLOOD BY AUTOMATED COUNT: 14 % (ref 11.5–14.5)
GLUCOSE SERPL-MCNC: 91 MG/DL (ref 74–99)
HCT VFR BLD AUTO: 23.5 % (ref 36–46)
HGB BLD-MCNC: 7.2 G/DL (ref 12–16)
IMM GRANULOCYTES # BLD AUTO: 0.07 X10*3/UL (ref 0–0.5)
IMM GRANULOCYTES NFR BLD AUTO: 0.7 % (ref 0–0.9)
LYMPHOCYTES # BLD AUTO: 1.42 X10*3/UL (ref 0.8–3)
LYMPHOCYTES NFR BLD AUTO: 14.6 %
MAGNESIUM SERPL-MCNC: 1.98 MG/DL (ref 1.6–2.4)
MCH RBC QN AUTO: 32.1 PG (ref 26–34)
MCHC RBC AUTO-ENTMCNC: 30.6 G/DL (ref 32–36)
MCV RBC AUTO: 105 FL (ref 80–100)
MONOCYTES # BLD AUTO: 1.45 X10*3/UL (ref 0.05–0.8)
MONOCYTES NFR BLD AUTO: 14.9 %
NEUTROPHILS # BLD AUTO: 6.26 X10*3/UL (ref 1.6–5.5)
NEUTROPHILS NFR BLD AUTO: 64.3 %
NRBC BLD-RTO: 0 /100 WBCS (ref 0–0)
PHOSPHATE SERPL-MCNC: 4.7 MG/DL (ref 2.5–4.9)
PLATELET # BLD AUTO: 244 X10*3/UL (ref 150–450)
POTASSIUM SERPL-SCNC: 3.6 MMOL/L (ref 3.5–5.3)
PRODUCT BLOOD TYPE: 6200
PRODUCT CODE: NORMAL
RBC # BLD AUTO: 2.24 X10*6/UL (ref 4–5.2)
SODIUM SERPL-SCNC: 143 MMOL/L (ref 136–145)
UNIT ABO: NORMAL
UNIT NUMBER: NORMAL
UNIT RH: NORMAL
UNIT VOLUME: 350
UREA/CREAT UR-SRTO: 7.5 G/G CREAT
UUN UR-MCNC: 211 MG/DL
WBC # BLD AUTO: 9.7 X10*3/UL (ref 4.4–11.3)
XM INTEP: NORMAL

## 2024-08-05 PROCEDURE — 83735 ASSAY OF MAGNESIUM: CPT

## 2024-08-05 PROCEDURE — 99222 1ST HOSP IP/OBS MODERATE 55: CPT | Performed by: STUDENT IN AN ORGANIZED HEALTH CARE EDUCATION/TRAINING PROGRAM

## 2024-08-05 PROCEDURE — P9016 RBC LEUKOCYTES REDUCED: HCPCS

## 2024-08-05 PROCEDURE — 2500000001 HC RX 250 WO HCPCS SELF ADMINISTERED DRUGS (ALT 637 FOR MEDICARE OP)

## 2024-08-05 PROCEDURE — 97162 PT EVAL MOD COMPLEX 30 MIN: CPT | Mod: GP

## 2024-08-05 PROCEDURE — 36430 TRANSFUSION BLD/BLD COMPNT: CPT

## 2024-08-05 PROCEDURE — 80069 RENAL FUNCTION PANEL: CPT

## 2024-08-05 PROCEDURE — 84550 ASSAY OF BLOOD/URIC ACID: CPT

## 2024-08-05 PROCEDURE — 30233N1 TRANSFUSION OF NONAUTOLOGOUS RED BLOOD CELLS INTO PERIPHERAL VEIN, PERCUTANEOUS APPROACH: ICD-10-PCS | Performed by: STUDENT IN AN ORGANIZED HEALTH CARE EDUCATION/TRAINING PROGRAM

## 2024-08-05 PROCEDURE — 2500000004 HC RX 250 GENERAL PHARMACY W/ HCPCS (ALT 636 FOR OP/ED): Mod: JZ

## 2024-08-05 PROCEDURE — 1100000001 HC PRIVATE ROOM DAILY

## 2024-08-05 PROCEDURE — 99233 SBSQ HOSP IP/OBS HIGH 50: CPT | Performed by: STUDENT IN AN ORGANIZED HEALTH CARE EDUCATION/TRAINING PROGRAM

## 2024-08-05 PROCEDURE — 85025 COMPLETE CBC W/AUTO DIFF WBC: CPT

## 2024-08-05 RX ORDER — MIRTAZAPINE 15 MG/1
15 TABLET, FILM COATED ORAL NIGHTLY
Qty: 30 TABLET | Refills: 0 | Status: SHIPPED | OUTPATIENT
Start: 2024-08-05 | End: 2024-09-04

## 2024-08-05 RX ORDER — POLYETHYLENE GLYCOL 3350 17 G/17G
17 POWDER, FOR SOLUTION ORAL DAILY
Status: DISCONTINUED | OUTPATIENT
Start: 2024-08-06 | End: 2024-08-07 | Stop reason: HOSPADM

## 2024-08-05 RX ORDER — AMOXICILLIN 250 MG
2 CAPSULE ORAL NIGHTLY PRN
Status: DISCONTINUED | OUTPATIENT
Start: 2024-08-05 | End: 2024-08-05

## 2024-08-05 RX ORDER — MIRTAZAPINE 15 MG/1
15 TABLET, FILM COATED ORAL NIGHTLY
Status: DISCONTINUED | OUTPATIENT
Start: 2024-08-05 | End: 2024-08-07 | Stop reason: HOSPADM

## 2024-08-05 RX ORDER — POLYETHYLENE GLYCOL 3350 17 G/17G
17 POWDER, FOR SOLUTION ORAL DAILY
Status: DISCONTINUED | OUTPATIENT
Start: 2024-08-06 | End: 2024-08-05

## 2024-08-05 RX ORDER — ALUMINUM HYDROXIDE, MAGNESIUM HYDROXIDE, AND SIMETHICONE 1200; 120; 1200 MG/30ML; MG/30ML; MG/30ML
10 SUSPENSION ORAL 4 TIMES DAILY PRN
Status: DISCONTINUED | OUTPATIENT
Start: 2024-08-05 | End: 2024-08-07 | Stop reason: HOSPADM

## 2024-08-05 RX ORDER — FAMOTIDINE 20 MG/1
20 TABLET, FILM COATED ORAL DAILY
Status: DISCONTINUED | OUTPATIENT
Start: 2024-08-05 | End: 2024-08-07 | Stop reason: HOSPADM

## 2024-08-05 RX ADMIN — ACETAMINOPHEN 975 MG: 325 TABLET ORAL at 22:13

## 2024-08-05 RX ADMIN — LEVOTHYROXINE, LIOTHYRONINE 90 MG: 38; 9 TABLET ORAL at 06:00

## 2024-08-05 RX ADMIN — ACETAMINOPHEN 975 MG: 325 TABLET ORAL at 02:38

## 2024-08-05 RX ADMIN — Medication 2000 UNITS: at 06:00

## 2024-08-05 RX ADMIN — TORSEMIDE 20 MG: 20 TABLET ORAL at 09:50

## 2024-08-05 RX ADMIN — ACETAMINOPHEN 975 MG: 325 TABLET ORAL at 13:28

## 2024-08-05 RX ADMIN — CEFAZOLIN SODIUM 1 G: 1 INJECTION, SOLUTION INTRAVENOUS at 09:51

## 2024-08-05 RX ADMIN — CEFAZOLIN SODIUM 1 G: 1 INJECTION, SOLUTION INTRAVENOUS at 22:31

## 2024-08-05 RX ADMIN — CARVEDILOL 6.25 MG: 12.5 TABLET, FILM COATED ORAL at 21:15

## 2024-08-05 RX ADMIN — CARVEDILOL 6.25 MG: 12.5 TABLET, FILM COATED ORAL at 09:50

## 2024-08-05 RX ADMIN — Medication 5 MG: at 00:42

## 2024-08-05 RX ADMIN — LOSARTAN POTASSIUM 50 MG: 25 TABLET, FILM COATED ORAL at 09:50

## 2024-08-05 RX ADMIN — MIRTAZAPINE 15 MG: 15 TABLET, FILM COATED ORAL at 21:15

## 2024-08-05 RX ADMIN — FAMOTIDINE 20 MG: 20 TABLET ORAL at 13:28

## 2024-08-05 ASSESSMENT — PAIN SCALES - GENERAL
PAINLEVEL_OUTOF10: 7
PAINLEVEL_OUTOF10: 0 - NO PAIN
PAINLEVEL_OUTOF10: 0 - NO PAIN
PAINLEVEL_OUTOF10: 3
PAINLEVEL_OUTOF10: 0 - NO PAIN
PAINLEVEL_OUTOF10: 0 - NO PAIN
PAINLEVEL_OUTOF10: 3

## 2024-08-05 ASSESSMENT — ENCOUNTER SYMPTOMS
CHILLS: 0
FEVER: 0

## 2024-08-05 ASSESSMENT — COGNITIVE AND FUNCTIONAL STATUS - GENERAL
STANDING UP FROM CHAIR USING ARMS: A LITTLE
MOVING TO AND FROM BED TO CHAIR: A LITTLE
MOBILITY SCORE: 17
MOVING FROM LYING ON BACK TO SITTING ON SIDE OF FLAT BED WITH BEDRAILS: A LITTLE
WALKING IN HOSPITAL ROOM: A LITTLE
TURNING FROM BACK TO SIDE WHILE IN FLAT BAD: A LITTLE
CLIMB 3 TO 5 STEPS WITH RAILING: A LOT

## 2024-08-05 ASSESSMENT — ACTIVITIES OF DAILY LIVING (ADL)
LACK_OF_TRANSPORTATION: NO
ADL_ASSISTANCE: INDEPENDENT

## 2024-08-05 ASSESSMENT — PAIN - FUNCTIONAL ASSESSMENT
PAIN_FUNCTIONAL_ASSESSMENT: 0-10

## 2024-08-05 ASSESSMENT — PAIN DESCRIPTION - LOCATION: LOCATION: TOE (COMMENT WHICH ONE)

## 2024-08-05 NOTE — PROGRESS NOTES
"Xin Fajardo \"Prisca\" is a 83 y.o. female who is hospital day 3 with PMH of ongoing extensive workup for 7 months of \"failure to thrive,\" remote colon + lung cancer, complex abdominal surgical hx, hemorrhoids, papillary thyroid cancer s/p thyroidectomy, chronic diastolic heart failure, mitral regurgitation (moderate per TTE 2022, HTN, HLD, stage IIIb CKD, osteoporosis, macular degeneration,  presenting s/p right cheek Mohs surgery (7/23) now with surgical site infection and constipation.    Subjective   Interval Events:   NAEON.     This morning:   Seen resting in bed in no acute distress. Patient states she has been having consistent bowel movements and right cheek is less tender than prior. Continues to endorse some stomach, sacral, and shoulder pain. Patient is denying any new pain, SOB, chest pain, constipation.    =========  Scheduled Medications  Scheduled medications  carvedilol, 6.25 mg, oral, BID  ceFAZolin, 1 g, intravenous, q12h  cholecalciferol, 2,000 Units, oral, Daily  famotidine, 20 mg, oral, Daily  heparin (porcine), 5,000 Units, subcutaneous, q8h  lidocaine, 5 mL, infiltration, Once  lidocaine, 5 mL, infiltration, Once  losartan, 50 mg, oral, Daily  mirtazapine, 15 mg, oral, Nightly  [START ON 8/6/2024] polyethylene glycol, 17 g, oral, Daily  thyroid (pork), 90 mg, oral, Daily before breakfast  torsemide, 20 mg, oral, Daily      Continuous medications       PRN medications  PRN medications: acetaminophen **OR** [DISCONTINUED] acetaminophen **OR** [DISCONTINUED] acetaminophen, alum-mag hydroxide-simeth, lidocaine, melatonin, ondansetron **OR** ondansetron, phenyleph-min oil-petrolatum, witch hazel    Continuous Medications         PRN Medications  PRN medications: acetaminophen **OR** [DISCONTINUED] acetaminophen **OR** [DISCONTINUED] acetaminophen, alum-mag hydroxide-simeth, lidocaine, melatonin, ondansetron **OR** ondansetron, phenyleph-min oil-petrolatum, witch hazel    Objective   Vital " "Signs  /62   Pulse 71   Temp 36.7 °C (98.1 °F)   Resp 18   Ht 1.473 m (4' 10\")   Wt (!) 43.1 kg (95 lb)   SpO2 98%   BMI 19.86 kg/m²     Physical Exam   GENERAL: laying in bed comfortably; well-appearing and in NAD.  EYES: no conjunctival injection, lesions, or scleral icterus.  ENT: no discharge or bleeding; moist mucous membranes; hearing intact to conversational tone.  HEART: RRR; audible S1 and S2; no extra heart sounds, murmurs, gallops, rubs, or knocks.  CHEST: CTAB; audible vesicular breath sounds throughout the bilateral lung fields. Breathing is even and unlabored. No wheezes, ronchi, rales.   ABDOMINAL: abdomen diffusely tender to palpation across all quadrants; no rebound tenderness, guarding  EXTREMITIES: BLE edema up to the mid calf; areas of ecchymosis appreciated on bilateral anterior calves; no tender to palpation  INTEGUMENT: warm, dry, elastic, and intact. Normal turgor.  PSYCHIATRIC/MSE: conversational; full euthymic affect; logical thought process; cognition intact.  NEUROLOGICAL: A&O to self, date, location. Language expression, naming, and repetition intact. Remains focused during interview.           Recent/Relevant Labs:  Results for orders placed or performed during the hospital encounter of 08/02/24 (from the past 96 hour(s))   Renal function panel   Result Value Ref Range    Glucose 92 74 - 99 mg/dL    Sodium 139 136 - 145 mmol/L    Potassium 4.4 3.5 - 5.3 mmol/L    Chloride 119 (H) 98 - 107 mmol/L    Bicarbonate 8 (LL) 21 - 32 mmol/L    Anion Gap 16 10 - 20 mmol/L    Urea Nitrogen 55 (H) 6 - 23 mg/dL    Creatinine 1.76 (H) 0.50 - 1.05 mg/dL    eGFR 28 (L) >60 mL/min/1.73m*2    Calcium 9.3 8.6 - 10.6 mg/dL    Phosphorus 4.3 2.5 - 4.9 mg/dL    Albumin 3.4 3.4 - 5.0 g/dL   CBC and Auto Differential   Result Value Ref Range    WBC 11.9 (H) 4.4 - 11.3 x10*3/uL    nRBC 0.0 0.0 - 0.0 /100 WBCs    RBC 2.69 (L) 4.00 - 5.20 x10*6/uL    Hemoglobin 8.6 (L) 12.0 - 16.0 g/dL    Hematocrit " 28.7 (L) 36.0 - 46.0 %     (H) 80 - 100 fL    MCH 32.0 26.0 - 34.0 pg    MCHC 30.0 (L) 32.0 - 36.0 g/dL    RDW 13.9 11.5 - 14.5 %    Platelets 299 150 - 450 x10*3/uL    Neutrophils % 71.6 40.0 - 80.0 %    Immature Granulocytes %, Automated 0.7 0.0 - 0.9 %    Lymphocytes % 13.0 13.0 - 44.0 %    Monocytes % 11.6 2.0 - 10.0 %    Eosinophils % 2.7 0.0 - 6.0 %    Basophils % 0.4 0.0 - 2.0 %    Neutrophils Absolute 8.54 (H) 1.60 - 5.50 x10*3/uL    Immature Granulocytes Absolute, Automated 0.08 0.00 - 0.50 x10*3/uL    Lymphocytes Absolute 1.55 0.80 - 3.00 x10*3/uL    Monocytes Absolute 1.38 (H) 0.05 - 0.80 x10*3/uL    Eosinophils Absolute 0.32 0.00 - 0.40 x10*3/uL    Basophils Absolute 0.05 0.00 - 0.10 x10*3/uL   Magnesium   Result Value Ref Range    Magnesium 1.72 1.60 - 2.40 mg/dL   Ferritin   Result Value Ref Range    Ferritin 292 (H) 8 - 150 ng/mL   Iron and TIBC   Result Value Ref Range    Iron 42 35 - 150 ug/dL    UIBC 174 110 - 370 ug/dL    TIBC 216 (L) 240 - 445 ug/dL    % Saturation 19 (L) 25 - 45 %   Reticulocytes   Result Value Ref Range    Retic % 2.4 (H) 0.5 - 2.0 %    Retic Absolute 0.064 0.017 - 0.110 x10*6/uL    Reticulocyte Hemoglobin 34 28 - 38 pg    Immature Retic fraction 14.1 <=16.0 %   Lactate   Result Value Ref Range    Lactate 0.9 0.4 - 2.0 mmol/L   BLOOD GAS VENOUS FULL PANEL   Result Value Ref Range    POCT pH, Venous 7.19 (LL) 7.33 - 7.43 pH    POCT pCO2, Venous 25 (L) 41 - 51 mm Hg    POCT pO2, Venous 15 (L) 35 - 45 mm Hg    POCT SO2, Venous 24 (L) 45 - 75 %    POCT Oxy Hemoglobin, Venous 24.1 (L) 45.0 - 75.0 %    POCT Hematocrit Calculated, Venous 31.0 (L) 36.0 - 46.0 %    POCT Sodium, Venous 139 136 - 145 mmol/L    POCT Potassium, Venous 3.8 3.5 - 5.3 mmol/L    POCT Chloride, Venous 116 (H) 98 - 107 mmol/L    POCT Ionized Calicum, Venous 1.40 (H) 1.10 - 1.33 mmol/L    POCT Glucose, Venous 150 (H) 74 - 99 mg/dL    POCT Lactate, Venous 0.9 0.4 - 2.0 mmol/L    POCT Base Excess, Venous  -17.1 (L) -2.0 - 3.0 mmol/L    POCT HCO3 Calculated, Venous 9.5 (L) 22.0 - 26.0 mmol/L    POCT Hemoglobin, Venous 10.2 (L) 12.0 - 16.0 g/dL    POCT Anion Gap, Venous 17.0 10.0 - 25.0 mmol/L    Patient Temperature 37.0 degrees Celsius    FiO2 0 %   LYME (B. BURGDORFERI) AB MODIFIED 2-TITER TESTING, WITH REFLEX TO IGM AND IGG BY MARYSOL   Result Value Ref Range    B. burgdorferi VlsE1/pepC10 Abs, MARYSOL 0.12 <=0.90 IV   Vitamin B12   Result Value Ref Range    Vitamin B12 376 211 - 911 pg/mL   Folate   Result Value Ref Range    Folate, Serum 20.5 >5.0 ng/mL   CBC and Auto Differential   Result Value Ref Range    WBC 10.9 4.4 - 11.3 x10*3/uL    nRBC 0.0 0.0 - 0.0 /100 WBCs    RBC 2.51 (L) 4.00 - 5.20 x10*6/uL    Hemoglobin 8.2 (L) 12.0 - 16.0 g/dL    Hematocrit 26.9 (L) 36.0 - 46.0 %     (H) 80 - 100 fL    MCH 32.7 26.0 - 34.0 pg    MCHC 30.5 (L) 32.0 - 36.0 g/dL    RDW 13.9 11.5 - 14.5 %    Platelets 244 150 - 450 x10*3/uL    Neutrophils % 72.8 40.0 - 80.0 %    Immature Granulocytes %, Automated 0.5 0.0 - 0.9 %    Lymphocytes % 10.0 13.0 - 44.0 %    Monocytes % 12.1 2.0 - 10.0 %    Eosinophils % 4.1 0.0 - 6.0 %    Basophils % 0.5 0.0 - 2.0 %    Neutrophils Absolute 7.92 (H) 1.60 - 5.50 x10*3/uL    Immature Granulocytes Absolute, Automated 0.05 0.00 - 0.50 x10*3/uL    Lymphocytes Absolute 1.09 0.80 - 3.00 x10*3/uL    Monocytes Absolute 1.32 (H) 0.05 - 0.80 x10*3/uL    Eosinophils Absolute 0.45 (H) 0.00 - 0.40 x10*3/uL    Basophils Absolute 0.05 0.00 - 0.10 x10*3/uL   Renal Function Panel   Result Value Ref Range    Glucose 84 74 - 99 mg/dL    Sodium 139 136 - 145 mmol/L    Potassium 4.3 3.5 - 5.3 mmol/L    Chloride 118 (H) 98 - 107 mmol/L    Bicarbonate 9 (LL) 21 - 32 mmol/L    Anion Gap 16 10 - 20 mmol/L    Urea Nitrogen 51 (H) 6 - 23 mg/dL    Creatinine 1.80 (H) 0.50 - 1.05 mg/dL    eGFR 27 (L) >60 mL/min/1.73m*2    Calcium 8.6 8.6 - 10.6 mg/dL    Phosphorus 3.7 2.5 - 4.9 mg/dL    Albumin 2.8 (L) 3.4 - 5.0 g/dL    Magnesium   Result Value Ref Range    Magnesium 1.54 (L) 1.60 - 2.40 mg/dL   Calcium, ionized   Result Value Ref Range    POCT Calcium, Ionized 1.40 (H) 1.1 - 1.33 mmol/L   Transferrin   Result Value Ref Range    Transferrin 152 (L) 200 - 360 mg/dL   Type And Screen   Result Value Ref Range    ABO TYPE A     Rh TYPE POS     ANTIBODY SCREEN NEG    Comprehensive Metabolic Panel   Result Value Ref Range    Glucose 138 (H) 74 - 99 mg/dL    Sodium 138 136 - 145 mmol/L    Potassium 3.5 3.5 - 5.3 mmol/L    Chloride 113 (H) 98 - 107 mmol/L    Bicarbonate 14 (L) 21 - 32 mmol/L    Anion Gap 15 10 - 20 mmol/L    Urea Nitrogen 43 (H) 6 - 23 mg/dL    Creatinine 1.78 (H) 0.50 - 1.05 mg/dL    eGFR 28 (L) >60 mL/min/1.73m*2    Calcium 8.4 (L) 8.6 - 10.6 mg/dL    Albumin 2.7 (L) 3.4 - 5.0 g/dL    Alkaline Phosphatase 41 33 - 136 U/L    Total Protein 4.6 (L) 6.4 - 8.2 g/dL    AST 14 9 - 39 U/L    Bilirubin, Total 0.2 0.0 - 1.2 mg/dL    ALT 8 7 - 45 U/L   Magnesium   Result Value Ref Range    Magnesium 2.60 (H) 1.60 - 2.40 mg/dL   Vancomycin   Result Value Ref Range    Vancomycin 12.0 5.0 - 20.0 ug/mL   Urine electrolytes   Result Value Ref Range    Sodium, Urine Random 81 mmol/L    Sodium/Creatinine Ratio 277 Not established. mmol/g Creat    Potassium, Urine Random 28 mmol/L    Potassium/Creatinine Ratio 96 Not established mmol/g Creat    Chloride, Urine Random 102 mmol/L    Chloride/Creatinine Ratio 349 (H) 38 - 318 mmol/g creat    Creatinine, Urine Random 29.2 20.0 - 320.0 mg/dL   Urinalysis with Reflex Microscopic   Result Value Ref Range    Color, Urine Light-Yellow Light-Yellow, Yellow, Dark-Yellow    Appearance, Urine Turbid (N) Clear    Specific Gravity, Urine 1.010 1.005 - 1.035    pH, Urine 5.0 5.0, 5.5, 6.0, 6.5, 7.0, 7.5, 8.0    Protein, Urine NEGATIVE NEGATIVE, 10 (TRACE), 20 (TRACE) mg/dL    Glucose, Urine Normal Normal mg/dL    Blood, Urine 0.2 (2+) (A) NEGATIVE    Ketones, Urine NEGATIVE NEGATIVE mg/dL    Bilirubin,  Urine NEGATIVE NEGATIVE    Urobilinogen, Urine Normal Normal mg/dL    Nitrite, Urine NEGATIVE NEGATIVE    Leukocyte Esterase, Urine 500 Leta/µL (A) NEGATIVE   Microscopic Only, Urine   Result Value Ref Range    WBC, Urine >50 (A) 1-5, NONE /HPF    WBC Clumps, Urine OCCASIONAL Reference range not established. /HPF    RBC, Urine >20 (A) NONE, 1-2, 3-5 /HPF    Mucus, Urine FEW Reference range not established. /LPF   CBC and Auto Differential   Result Value Ref Range    WBC 8.8 4.4 - 11.3 x10*3/uL    nRBC 0.0 0.0 - 0.0 /100 WBCs    RBC 2.25 (L) 4.00 - 5.20 x10*6/uL    Hemoglobin 7.3 (L) 12.0 - 16.0 g/dL    Hematocrit 22.9 (L) 36.0 - 46.0 %     (H) 80 - 100 fL    MCH 32.4 26.0 - 34.0 pg    MCHC 31.9 (L) 32.0 - 36.0 g/dL    RDW 13.6 11.5 - 14.5 %    Platelets 235 150 - 450 x10*3/uL    Neutrophils % 58.5 40.0 - 80.0 %    Immature Granulocytes %, Automated 0.7 0.0 - 0.9 %    Lymphocytes % 20.6 13.0 - 44.0 %    Monocytes % 14.0 2.0 - 10.0 %    Eosinophils % 5.7 0.0 - 6.0 %    Basophils % 0.5 0.0 - 2.0 %    Neutrophils Absolute 5.18 1.60 - 5.50 x10*3/uL    Immature Granulocytes Absolute, Automated 0.06 0.00 - 0.50 x10*3/uL    Lymphocytes Absolute 1.82 0.80 - 3.00 x10*3/uL    Monocytes Absolute 1.24 (H) 0.05 - 0.80 x10*3/uL    Eosinophils Absolute 0.50 (H) 0.00 - 0.40 x10*3/uL    Basophils Absolute 0.04 0.00 - 0.10 x10*3/uL   Renal Function Panel   Result Value Ref Range    Glucose 93 74 - 99 mg/dL    Sodium 140 136 - 145 mmol/L    Potassium 3.1 (L) 3.5 - 5.3 mmol/L    Chloride 108 (H) 98 - 107 mmol/L    Bicarbonate 23 21 - 32 mmol/L    Anion Gap 12 10 - 20 mmol/L    Urea Nitrogen 44 (H) 6 - 23 mg/dL    Creatinine 1.72 (H) 0.50 - 1.05 mg/dL    eGFR 29 (L) >60 mL/min/1.73m*2    Calcium 7.7 (L) 8.6 - 10.6 mg/dL    Phosphorus 3.8 2.5 - 4.9 mg/dL    Albumin 2.5 (L) 3.4 - 5.0 g/dL   Magnesium   Result Value Ref Range    Magnesium 2.07 1.60 - 2.40 mg/dL   Renal Function Panel   Result Value Ref Range    Glucose 91 74 - 99  "mg/dL    Sodium 143 136 - 145 mmol/L    Potassium 3.6 3.5 - 5.3 mmol/L    Chloride 113 (H) 98 - 107 mmol/L    Bicarbonate 22 21 - 32 mmol/L    Anion Gap 12 10 - 20 mmol/L    Urea Nitrogen 47 (H) 6 - 23 mg/dL    Creatinine 2.01 (H) 0.50 - 1.05 mg/dL    eGFR 24 (L) >60 mL/min/1.73m*2    Calcium 7.9 (L) 8.6 - 10.6 mg/dL    Phosphorus 4.7 2.5 - 4.9 mg/dL    Albumin 2.6 (L) 3.4 - 5.0 g/dL   Magnesium   Result Value Ref Range    Magnesium 1.98 1.60 - 2.40 mg/dL   CBC and Auto Differential   Result Value Ref Range    WBC 9.7 4.4 - 11.3 x10*3/uL    nRBC 0.0 0.0 - 0.0 /100 WBCs    RBC 2.24 (L) 4.00 - 5.20 x10*6/uL    Hemoglobin 7.2 (L) 12.0 - 16.0 g/dL    Hematocrit 23.5 (L) 36.0 - 46.0 %     (H) 80 - 100 fL    MCH 32.1 26.0 - 34.0 pg    MCHC 30.6 (L) 32.0 - 36.0 g/dL    RDW 14.0 11.5 - 14.5 %    Platelets 244 150 - 450 x10*3/uL    Neutrophils % 64.3 40.0 - 80.0 %    Immature Granulocytes %, Automated 0.7 0.0 - 0.9 %    Lymphocytes % 14.6 13.0 - 44.0 %    Monocytes % 14.9 2.0 - 10.0 %    Eosinophils % 5.1 0.0 - 6.0 %    Basophils % 0.4 0.0 - 2.0 %    Neutrophils Absolute 6.26 (H) 1.60 - 5.50 x10*3/uL    Immature Granulocytes Absolute, Automated 0.07 0.00 - 0.50 x10*3/uL    Lymphocytes Absolute 1.42 0.80 - 3.00 x10*3/uL    Monocytes Absolute 1.45 (H) 0.05 - 0.80 x10*3/uL    Eosinophils Absolute 0.50 (H) 0.00 - 0.40 x10*3/uL    Basophils Absolute 0.04 0.00 - 0.10 x10*3/uL            BNP   Date/Time Value Ref Range Status   06/04/2024 01:37 PM 72 0 - 99 pg/mL Final     Recent/Relevant Imaging:  - extensive outpatient imaging for \"failure to thrive\" workup  - no imagining on current admission    Other Recent/Relevant Studies:  - 8/1 - wound culture MSSA positive at 72 hrs    Assessment and Plan   Assessment   Xin Fajardo \"Prisca\" is a 83 y.o. female who is hospital day 3 with PMH of ongoing extensive workup for 7 months of \"failure to thrive,\" remote colon + lung cancer, complex abdominal surgical hx, hemorrhoids, " "papillary thyroid cancer s/p thyroidectomy, chronic diastolic heart failure, mitral regurgitation (moderate per TTE 2022, HTN, HLD, stage IIIb CKD, osteoporosis, macular degeneration,  presenting s/p right cheek Mohs surgery (7/23) now with surgical site infection and severe constipation.     Wound culture obtained in dermatology clinic 8/1. Will plan to follow-up results for abx decision making. Pending results, will treat with vanc/zosyn to cover for MRSA and possible pseudomonas given recent surgery.     Given complex abdominal surgical hx, currently suspect constipation is 2/2 to chronic colonic dilation, colonic adhesions, and possible gastroparesis i/s/o acute infection. Given increased abdominal pain from yesterday, will plan dulcolax suppository in addition to Miralax/senna. Will also give preparation H and Tucks for hemorrhoid pain. Will continue to monitor and escalate as required. Given benign abdominal exam, not currently worried about obstruction or need for urgent surgical intervention.     Updates 8/5:  - continue to monitor facial wound; still has dehiscence and drainage but no pain - consulting derm for discussion of any other interventions prior to discharge  - PT to evaluate for discharge recs for outpatient needs  - blood transfusion and consent d/t increasing anemia  - plan to discharge on 2 weeks Keflex + time on IV (8/2-8/16)    Problem-Based Plan   #surgical site infection  :: Mohs surgery on 7/23/24  :: purulence noted and doxycyline 100mg BID started on 7/30 for 7 days; pt stopped after 1.5 days  :: 8/1 wound culture MSSA at 72 hrs  Plan  - continue IV cefazolin and discharge on Keflex (total course: 8/2-8/16)  - monitor erythema margins  - consult to dermatology    #non-anion gap metabolic acidosis  #anemia  #stage IIIb CKD  #reported ~7 months increased fatigue, \"failure to thrive\"  :: likely 2/2 CKD w/ possible contribution from renal tubular acidosis likely 2/2 antibiotics vs other " etiology  :: team told by daughter that 7 months ago, bicarb was normal  :: Bicarb - 11 (6/4) -> 11 (6/24) -> 8 (8/2) -> 9 (8/3)  :: lactate - 0.9 (8/2)  :: Hgb: 10.1 (6/7) -> 8.6 (8/2) -> 8.2 (8/3)  :: 8/3 - folate: 20.5; B12: 376  :: 8/3 - transferrin: 152 (low); TIBC: 216 (low); iron: 42 (normal: ) ; ferritin: 292 (high)  :: 8/3 - retic absolute: 0.065; retic %: 2.4; retic index 1.02 (hypoproliferation <2)  :: vBG (8/3) - CO2: 25; pH: 7.19  :: s/p 500mL LR bolus 8/2  :: 8/3 - no evidence of dyspnea or altered mental status on AM exam  Plan  - blood transfusion consent and transfuse 1 unit of blood  - continue to monitor bicarb and hemoglobin  - pending urine electrolytes, urinalysis, RFP, mag  - pending ECG  - consult to PT/OT    #constipation (resolved)  #hemorrhoids  #abdominal discomfort and loss of appetitie  :: Differential: chronic colonic dialtion vs colonic adhesions vs gastroparesis vs drug interaction from doxy  Plan  - stop senna  - hold Miralax until tomorrow  - continue to monitor  - start Mirtazapine 15mg nightly  - start Famotidine 20mg daily  - start Mylanta     Chronic Conditions  #HTN: continue home carvedilol 6.25 mg BID; continue home losartan 50mg daily  #hypothyroidism: continue home armour thyroid 90mg daily  #chronic diastolic heart failure: continue home torsemide 20mg daily  #right shoulder pain from prior injury: Lidocaine patch PRN (12 hr on, 12 hr off)     F: Replete PRN  E: Replete PRN  N: Regular Diet  DVT Ppx: Heparin SubQ, get SCDs  GI Ppx: N/A  Access/Lines: Midline  Abx: Cefazolin  O2: None     Pain regimen: Tylenol 975mg PRN q4; for R shoulder - lidocaine patch; For hemorrhoids - Tucks QID PRN and Preparation H QID PRN  Nausea regimen: Zofran 4mg PRN q8  Bowel regimen: None; Miralax starting tomorrow  Discharge plan: pending PT eval and blood transfusion--most likely tomorrow     Code status: Full Code  Emergency contact: Michelle Hermelindo (daughter) [phone]    Jenny  Analisa, MSY4  Internal Medicine  [place] pager: [phone]

## 2024-08-05 NOTE — PROGRESS NOTES
08/05/24 0800   Discharge Planning   Living Arrangements Spouse/significant other   Support Systems Spouse/significant other;Children   Assistance Needed yes PT/OT   Type of Residence Private residence   Number of Stairs to Enter Residence 2   Number of Stairs Within Residence 12   Do you have animals or pets at home? Yes   Type of Animals or Pets 2 cats   Who is requesting discharge planning? Provider   Home or Post Acute Services None   Expected Discharge Disposition HH Services   Does the patient need discharge transport arranged? No   Financial Resource Strain   How hard is it for you to pay for the very basics like food, housing, medical care, and heating? Not hard   Housing Stability   In the last 12 months, was there a time when you were not able to pay the mortgage or rent on time? N   In the past 12 months, how many times have you moved where you were living? 0   At any time in the past 12 months, were you homeless or living in a shelter (including now)? N   Transportation Needs   In the past 12 months, has lack of transportation kept you from medical appointments or from getting medications? no   In the past 12 months, has lack of transportation kept you from meetings, work, or from getting things needed for daily living? No     Assessment Note:  Met with patient and Introduced myself as care coordinator and member of the Care Transitions team for discharge planning. Pt feels safe at home.   Transportation: Patient will have one of her children come get her at discharge.  Pharmacy: [facility].  DME: Has walker at home if she needs it.  Previous home care: No  Falls: No  PCP: Dr. Bucky Arrington  Dialysis: No    Address, phone number and emergency contact verified. All questions and concerns addressed. Will continue to follow patient for any discharge needs.    Transitional Care Coordination Progress Note:  Patient discussed during interdisciplinary rounds.   Team members  present: MD and TCC  Plan per Medical/Surgical team: Switching patient from IV antibiotics to PO antibiotics today.  Payor: Medicare  Discharge disposition: Home with [facility] for PT/OT.  Potential Barriers: None  ADOD: 08/06/24

## 2024-08-05 NOTE — CONSULTS
"Nutrition Initial Assessment:   Nutrition Assessment    --->Reason for Assessment: Admission nursing screening    Patient is a 83 y.o. female with h/o remote colon + lung CA, CHF, papillary thyroid CA s/p thyroidectomy, CKD, and c/p R cheek Mohs surgery on 07/23, admitted d/t surgical site infection.    Of note, it appears pt was on Remeron (7.5 mg at night) before being admitted. Team increased dose to 15 mg at night today.    Nutrition History:  This service met with pt earlier today, was sitting up in chair at time of visit with her.    Tells appetite has been poor x at least the last 3 mos now. Just does not feel like eating. When asked if she tried to eat throughout the day, she responded by saying she always tries to eat orange sherbet and drink water. What, if anything else she eats for the day, just depends on the day. Does drink Boost at home, usually ~1/2 carton each am.    C/o intermittent nausea at home + intermittent trouble swallowing (difficult to get pt to elaborate on this). Also mentioned chewing on the R side of her mouth can be difficult, \"because of the stitches on my face.\" Denied vomiting or diarrhea at home.    Since admit, still with c/o intermittent nausea and trouble chewing on the R side. Swallowing seems to be going okay, per her report. Is having loose stools, \"I think because of all the medicine I am on now.\"     Appetite/PO intakes improved yesterday, reports consuming all of her [employer] and eggs and breakfast meal then eating 100% of her cheeseburger and potato chips for dinner meal. Was not feeling too hungry this am, only took  a few bites of her breakfast meal earlier today.    --Vitamin/Herbal Supplement Use: MVI  --Food Allergies/Intolerances: none       Anthropometrics:  Height: 147.3 cm (4' 10\")   Weight: (!) 43.1 kg (95 lb)   BMI: 19.86  IBW/kg (Dietitian Calculated): 45.5 kg  Percent of IBW: 95 %       Weight History:   --Pt was unsure as to how much weight she has been losing " but knows she has been losing weight. Tells her UBW >several mos ago was ~57 kg.    Wt Readings per review of EMR:   08/02/24 43.1 kg (95 lb) (current wt)   07/08/24 45.4 kg (100 lb)--->5% wt loss from this date to present (significant)   06/27/24 45.5 kg (100 lb 6.4 oz)   06/24/24 45.8 kg (101 lb)   06/21/24 45.8 kg (101 lb)   06/07/24 46.7 kg (103 lb)   05/31/24 48.5 kg (107 lb)   05/22/24 54.4 kg (120 lb)--->21% wt loss from this date to present (significant)   06/01/23 54.4 kg (120 lb)   10/26/22 59.9 kg (132 lb)   09/06/22 55.3 kg (121 lb 14.6 oz)   08/24/22 59.9 kg (132 lb)   01/28/22 59.9 kg (132 lb)   12/13/21 55.3 kg (121 lb 14.6 oz)   11/17/21 59.9 kg (132 lb)     Nutrition Focused Physical Exam Findings:  Subcutaneous Fat Loss:   Orbital Fat Pads: Severe (dark circles, hollowing and loose skin)  Buccal Fat Pads: Severe (hollow, sunken and narrow face)  Triceps: Severe (negligible fat tissue)  Ribs: Defer  Muscle Wasting:  Temporalis: Severe (hollowed scooping depression)  Pectoralis (Clavicular Region): Severe (protruding prominent clavicle)  Deltoid/Trapezius: Severe (squared shoulders, acromion process prominent)  Interosseous: Severe (depressed area between thumb and forefinger)  Trapezius/Infraspinatus/Supraspinatus (Scapular Region): Defer  Quadriceps: Defer  Gastrocnemius: Defer  Edema:  Edema: none  Physical Findings:  Hair: Negative  Eyes: Negative  Nails: Negative  Skin: Positive (blanchable erythema to sacrum; surgical incision + wound to R side of face/cheek)    Nutrition Significant Labs:  CBC Trend:   Results from last 7 days   Lab Units 08/05/24  0742 08/04/24  0639 08/03/24  0813 08/02/24  1708   WBC AUTO x10*3/uL 9.7 8.8 10.9 11.9*   RBC AUTO x10*6/uL 2.24* 2.25* 2.51* 2.69*   HEMOGLOBIN g/dL 7.2* 7.3* 8.2* 8.6*   HEMATOCRIT % 23.5* 22.9* 26.9* 28.7*   MCV fL 105* 102* 107* 107*   PLATELETS AUTO x10*3/uL 244 235 244 299   BMP Trend:   Results from last 7 days   Lab Units 08/05/24  0741  08/04/24  0639 08/03/24  1644 08/03/24  0813   GLUCOSE mg/dL 91 93 138* 84   CALCIUM mg/dL 7.9* 7.7* 8.4* 8.6   SODIUM mmol/L 143 140 138 139   POTASSIUM mmol/L 3.6 3.1* 3.5 4.3   CO2 mmol/L 22 23 14* 9*   CHLORIDE mmol/L 113* 108* 113* 118*   BUN mg/dL 47* 44* 43* 51*   CREATININE mg/dL 2.01* 1.72* 1.78* 1.80*   Liver Function Trend:   Results from last 7 days   Lab Units 08/03/24  1644   ALK PHOS U/L 41   AST U/L 14   ALT U/L 8   BILIRUBIN TOTAL mg/dL 0.2   Renal Lab Trend:   Results from last 7 days   Lab Units 08/05/24  0741 08/04/24  0639 08/03/24  1644 08/03/24  0813   POTASSIUM mmol/L 3.6 3.1* 3.5 4.3   PHOSPHORUS mg/dL 4.7 3.8  --  3.7   SODIUM mmol/L 143 140 138 139   MAGNESIUM mg/dL 1.98 2.07 2.60* 1.54*   EGFR mL/min/1.73m*2 24* 29* 28* 27*   BUN mg/dL 47* 44* 43* 51*   CREATININE mg/dL 2.01* 1.72* 1.78* 1.80*   Vit B12:   Lab Results   Component Value Date    YHVRHOLD84 376 08/03/2024   Folate:   Lab Results   Component Value Date    FOLATE 20.5 08/03/2024      Medications:  Scheduled medications  carvedilol, 6.25 mg, oral, BID  ceFAZolin, 1 g, intravenous, q12h  cholecalciferol, 2,000 Units, oral, Daily  famotidine, 20 mg, oral, Daily  heparin (porcine), 5,000 Units, subcutaneous, q8h  lidocaine, 5 mL, infiltration, Once  lidocaine, 5 mL, infiltration, Once  losartan, 50 mg, oral, Daily  mirtazapine, 15 mg, oral, Nightly  [START ON 8/6/2024] polyethylene glycol, 17 g, oral, Daily  thyroid (pork), 90 mg, oral, Daily before breakfast  torsemide, 20 mg, oral, Daily    PRN medications  PRN medications: acetaminophen **OR** [DISCONTINUED] acetaminophen **OR** [DISCONTINUED] acetaminophen, alum-mag hydroxide-simeth, lidocaine, melatonin, ondansetron **OR** ondansetron, phenyleph-min oil-petrolatum, witch hazel    I/O:   --Last documented BM on 08/05/24; Stool Appearance: Loose (08/05/24 0943)    Dietary Orders (From admission, onward)       Start     Ordered    08/05/24 1140  Oral nutritional supplements   Until discontinued        Comments: Chocolate Ensure Plus   Question Answer Comment   Deliver with Dinner    Select supplement: Ensure Plus        08/05/24 1139    08/02/24 1615  Adult diet Regular  Diet effective now        Question:  Diet type  Answer:  Regular    08/02/24 1612                Estimated Needs:   Total Energy Estimated Needs (kCal): 1500+  Method for Estimating Needs: 43 (current wt) x ~35+  Total Protein Estimated Needs (g): 50-60  Method for Estimating Needs: 43 (current wt) x ~1.2g/kg--follow kidney function  Total Fluid Estimated Needs (mL): per MD/team        Nutrition Diagnosis   Malnutrition Diagnosis  Patient has Malnutrition Diagnosis: Yes  Diagnosis Status: New  Malnutrition Diagnosis: Severe malnutrition related to chronic disease or condition (acute-on-chronic; complex PMH, recent surgical site infection)  As Evidenced by: pt consuming </=75% estimated energy needs x >/=1 month, areas of severe muscle and subcutaneous fat losses, 5% wt loss in ~1 month, 21% wt loss in <3 mos    Additional Assessment Information: Considering severe malnutrition + increased needs with wound, do feel pt would benefit from use of oral nutrition supplements in-house; discussed with her, agreeable to trial Ensure Plus daily.       Nutrition Interventions/Recommendations     1. Continue Regular Diet, only as tolerated  --RDN placed order for Ensure Plus daily for added nutrition  --If any c/f dysphagia/aspiration during admit, recommend formal SLP eval    2. MVI + minerals, if medically appropriate    3. Check baseline 25(OH)-D level    4. If increasing dose of Remeron does not help promote pt's PO, recommend trial a different appetite stimulant, if medically appropriate    5. Please weigh pt at least once/week (standing preferred, if feasible)          Nutrition Prescription for Oral Nutrition: 1500+ kcals/day, 50-60 g pro/day        Nutrition Interventions:   Interventions: Meals and snacks, Medical food  supplement  Meals and Snacks: General healthful diet  Goal: Regular Diet  Medical Food Supplement: Commercial beverage  Goal: Ensure Plus daily (350 kcals, 13g pro each)    Nutrition Education: Encouraged ongoing PO + protein. Reviewed role of oral nutrition supplements in the daily diet. Discussed various types offered at this facility. Pt verbalized understanding, denied any particular questions for this writer at this time.       Nutrition Monitoring and Evaluation   Food/Nutrient Related History Monitoring  Monitoring and Evaluation Plan: Amount of food  Amount of Food: Estimated amout of food, Medical food intake  Criteria: consume >50% of meals/snacks/supplements    Body Composition/Growth/Weight History  Monitoring and Evaluation Plan: Weight  Weight: Measured weight  Criteria: maintain or gain weight    Biochemical Data, Medical Tests and Procedures  Monitoring and Evaluation Plan: Electrolyte/renal panel  Electrolyte and Renal Panel: Magnesium, Phosphorus, Potassium, Sodium  Criteria: WNL          Time Spent (min): 60 minutes

## 2024-08-05 NOTE — PROGRESS NOTES
"Physical Therapy    Physical Therapy Evaluation    Patient Name: Xin Fajardo \"Osiris"  MRN: [id number]  Today's Date: 8/5/2024   Time Calculation  Start Time: 0905  Stop Time: 0925  Time Calculation (min): 20 min    Assessment/Plan   PT Assessment  PT Assessment Results: Decreased endurance, Impaired balance, Decreased mobility  Rehab Prognosis: Good  Medical Staff Made Aware: Yes  End of Session Communication: Bedside nurse  Assessment Comment: Patient is an 84yo F presenting with failure to thrive and infection. Patient is indep at baseline. Patient CGA-SBA for mobility. rec use of FWW for ambulation. dc rec low  End of Session Patient Position: Up in chair, Alarm off, not on at start of session (RN aware)  IP OR SWING BED PT PLAN  Inpatient or Swing Bed: Inpatient  PT Plan  Treatment/Interventions: Bed mobility, Gait training, Transfer training, Balance training, Neuromuscular re-education, Strengthening, Endurance training, Therapeutic exercise, Range of motion, Therapeutic activity, Stair training  PT Plan: Ongoing PT  PT Frequency: 3 times per week  PT Discharge Recommendations: Low intensity level of continued care  Equipment Recommended upon Discharge:  (FWW)  PT Recommended Transfer Status: Assistive device  PT - OK to Discharge: Yes      Subjective   General Visit Information:  General  Reason for Referral: failure to thrive, infection  Past Medical History Relevant to Rehab: failure to thrive,\" remote colon + lung cancer, complex abdominal surgical hx, hemorrhoids, papillary thyroid cancer s/p thyroidectomy, chronic diastolic heart failure, mitral regurgitation (moderate per TTE 2022, HTN, HLD, stage IIIb CKD, osteoporosis, macular degeneration,  presenting s/p right cheek Mohs surgery (7/23  Prior to Session Communication: Bedside nurse  Patient Position Received: Bed, 3 rail up, Alarm on  General Comment: pt supine in bed, RN cleared. cooperative and pleasant with PT  Home Living:  Home Living  Type " of Home: House  Lives With: Spouse  Home Adaptive Equipment: Walker rolling or standard, Wheelchair-manual  Home Layout: Bed/bath upstairs, Stairs to alternate level with rails  Alternate Level Stairs-Number of Steps: 12  Home Access: Stairs to enter with rails  Entrance Stairs-Number of Steps: 2  Bathroom Shower/Tub: Walk-in shower  Prior Level of Function:  Prior Function Per Pt/Caregiver Report  Level of [place]: Independent with ADLs and functional transfers, Independent with homemaking with ambulation  Receives Help From: Family  ADL Assistance: Independent  Homemaking Assistance: Independent  Ambulatory Assistance: Independent  Prior Function Comments: - falls  Precautions:  Precautions  Medical Precautions: Fall precautions      Objective   Pain:  Pain Assessment  Pain Assessment: 0-10  0-10 (Numeric) Pain Score: 0 - No pain  Cognition:  Cognition  Overall Cognitive Status: Within Functional Limits    General Assessments:     Activity Tolerance  Endurance: Tolerates 10 - 20 min exercise with multiple rests    Sensation  Light Touch:  (denies)       Static Sitting Balance  Static Sitting-Level of Assistance: Close supervision  Static Sitting-Comment/Number of Minutes: 5  Functional Assessments:  Bed Mobility  Bed Mobility: Yes  Bed Mobility 1  Bed Mobility 1: Supine to sitting  Level of Assistance 1: Contact guard  Bed Mobility Comments 1: HOb elevated    Transfers  Transfer: Yes  Transfer 1  Transfer From 1: Sit to, Stand to  Transfer to 1: Stand, Sit  Technique 1: Sit to stand, Stand to sit  Transfer Level of Assistance 1: Contact guard, Hand held assistance  Trials/Comments 1: stood from EOB    Ambulation/Gait Training  Ambulation/Gait Training Performed: Yes  Ambulation/Gait Training 1  Surface 1: Level tile  Device 1: No device  Assistance 1: Hand held assistance, Contact guard  Quality of Gait 1: Shuffling gait, Decreased step length  Comments/Distance (ft) 1: 10' with HHA,  Ambulation/Gait Training  2  Surface 2: Level tile  Device 2: Rolling walker  Assistance 2:  (SBA)  Quality of Gait 2: Shuffling gait, Decreased step length (decreased sal)  Comments/Distance (ft) 2: pt ambulated with FWW, improved steadiness. 30' x2  Extremity/Trunk Assessments:  RLE   RLE :  (3/5)  LLE   LLE :  (3/5)  Outcome Measures:  [facility] Basic Mobility  Turning from your back to your side while in a flat bed without using bedrails: A little  Moving from lying on your back to sitting on the side of a flat bed without using bedrails: A little  Moving to and from bed to chair (including a wheelchair): A little  Standing up from a chair using your arms (e.g. wheelchair or bedside chair): A little  To walk in hospital room: A little  Climbing 3-5 steps with railing: A lot  Basic Mobility - Total Score: 17    Encounter Problems       Encounter Problems (Active)       Balance       tinetti score > 24 to indicate low risk for falls  (Progressing)       Start:  08/05/24    Expected End:  08/26/24               Mobility       STG - Patient will ambulate > 200' with LRAD modif indep (Progressing)       Start:  08/05/24    Expected End:  08/26/24            STG - Patient will ascend and descend 2 steps with LRAD modif indep  (Progressing)       Start:  08/05/24    Expected End:  08/26/24               PT Transfers       STG - Patient will perform bed mobility indep  (Progressing)       Start:  08/05/24    Expected End:  08/26/24            STG - Patient will transfer sit to and from stand modif indep LRAD (Progressing)       Start:  08/05/24    Expected End:  08/26/24               Pain - Adult              Education Documentation  Precautions, taught by Lydia Miranda PT at 8/5/2024 12:25 PM.  Learner: Patient  Readiness: Acceptance  Method: Explanation  Response: Verbalizes Understanding  Comment: edu on role of PT, dc plan and safety    Mobility Training, taught by Lydia Miranda PT at 8/5/2024 12:25 PM.  Learner: Patient  Readiness:  Acceptance  Method: Explanation  Response: Verbalizes Understanding  Comment: edu on role of PT, dc plan and safety    Education Comments  No comments found.

## 2024-08-05 NOTE — CONSULTS
DERMATOLOGY DEPARTMENT CONSULTATION NOTE  Name: Xin Fajardo  MRN: [id number]  : 1940    Reason for consultation: surgical wound infection     History of Present Illness    Xin Fajardo is a 83 y.o. female with a past medical history of BCC s/p Mohs procedure ()  who presented on 2024 with a surgical wound infection to the right cheek and was admitted for infection and constipation management. Dermatology was consulted for evaluation of wound infection.    Patient had an outpatient Mohs procedure done by Dr. Humphries to remove BCC on . The wound was closed with a linear primary closure. She was discharged with wound care instructions.     Four days later, she noted redness around her incision site and called the dermatology on-call physician. The resident on call prescribed doxycline 100mg BID x7 days for suspected wound infection. Patient states she started having constipation on , which she attributed to the doxycycline and subsequent stopped the antibiotic.     She followed up with Dr. Trujillo in the dermatology clinic on . A culture of the wound was obtained in clinic at that time and the patient was advised to continue taking doxycyline until culture results available. Patient followed up with Dr. Trujillo again in clinic on .  On , an inpatient admission was coordinated by Dr. Bonner in Infectious Disease for management of infection and constipation.    Patient was started on both Vancomycin and Zosyn at initiation of admission.  The culture from 2024 grew MSSA. Antibiotic regimen was witched to Vancomycin Cefazolin (1g, 100mL/hr q12hr) on 8/3 and Vancomycin was subsequently discontinued on .     Today patient states her right cheek wound is healing but states it is still painful. She denied any fever or chills.     Of note, she is now reports experiencing pain in her left big toe when she bends it and has noticed redness around the site since this morning. Patient  states that she has a history of gout but has not had a flare in many years.     Review of Systems  Review of Systems   Constitutional:  Negative for chills and fever.        Past Medical History  Past Medical History:   Diagnosis Date    Essential (primary) hypertension     HTN (hypertension), benign    Personal history of other diseases of the circulatory system     History of congestive heart failure    Personal history of other diseases of the circulatory system     History of coronary artery disease    Personal history of other endocrine, nutritional and metabolic disease     History of hypothyroidism    Personal history of other malignant neoplasm of bronchus and lung     History of malignant neoplasm of lung    Personal history of other malignant neoplasm of large intestine     History of malignant neoplasm of colon       Past Surgical History   has a past surgical history that includes Other surgical history (11/17/2021); Other surgical history (11/17/2021); Other surgical history (11/17/2021); Other surgical history (11/17/2021); US guided percutaneous peritoneal or retroperitoneal fluid collection drainage (5/29/2013); and US guided abscess drain (5/29/2013).     Allergies  Allergies   Allergen Reactions    Amlodipine Swelling       Medications  Scheduled Meds: carvedilol, 6.25 mg, oral, BID  ceFAZolin, 1 g, intravenous, q12h  cholecalciferol, 2,000 Units, oral, Daily  famotidine, 20 mg, oral, Daily  heparin (porcine), 5,000 Units, subcutaneous, q8h  lidocaine, 5 mL, infiltration, Once  lidocaine, 5 mL, infiltration, Once  losartan, 50 mg, oral, Daily  mirtazapine, 15 mg, oral, Nightly  [START ON 8/6/2024] polyethylene glycol, 17 g, oral, Daily  thyroid (pork), 90 mg, oral, Daily before breakfast  torsemide, 20 mg, oral, Daily       Continuous Infusions:     PRN Meds: PRN medications: acetaminophen **OR** [DISCONTINUED] acetaminophen **OR** [DISCONTINUED] acetaminophen, alum-mag hydroxide-simeth,  lidocaine, melatonin, ondansetron **OR** ondansetron, phenyleph-min oil-petrolatum, witch hazel     Family History  Family History   Problem Relation Name Age of Onset    Heart disease Mother      Heart attack Father  56         from MI    Coronary artery disease Father      Breast cancer Sister         Social History   reports that she has quit smoking. Her smoking use included cigarettes. She started smoking about 50 years ago. She has never been exposed to tobacco smoke. She has never used smokeless tobacco. She reports current alcohol use of about 1.0 - 2.0 standard drink of alcohol per week. She reports that she does not use drugs.     Objective    Vitals:    24 0554   BP: (!) 86/41 (!) 89/44 101/57 109/62   BP Location: Right arm Right arm Right arm    Patient Position: Lying Lying Lying    Pulse: 79 82 83 71   Resp: 18 18 16 18   Temp: 36.5 °C (97.7 °F)   36.7 °C (98.1 °F)   TempSrc: Temporal      SpO2: 98% 99% 99% 98%   Weight:       Height:            Exam    GEN: no acute distress  NEURO: moving all extremities   EYES: conjunctiva and eyelids normal. No conjunctival injection or erosions appreciated  ENT:   - Lips: normal  NECK: normal and symmetric.   SKIN: A focused body skin exam including face, eyes, ears, neck, & sacral area were examined with the following findings:    - Involving the right malar and mandibular cheek there is dehiscence of the suture line with open ulceration. There appears to be granulation tissue at the base with minimal purulent fluid. Surrounding the suture line there is a patch of erythema, with borders decreased in size as compared to prior markings.     - Involving the left distal medial and dorsal foot there is an erythematous patch which is tender to palpation without fluctuance. Reproducible pain with passive dorsiflexion and plantarflexion of great toe.       Laboratory and Data  Results for orders placed or performed  during the hospital encounter of 08/02/24 (from the past 24 hour(s))   Renal Function Panel   Result Value Ref Range    Glucose 91 74 - 99 mg/dL    Sodium 143 136 - 145 mmol/L    Potassium 3.6 3.5 - 5.3 mmol/L    Chloride 113 (H) 98 - 107 mmol/L    Bicarbonate 22 21 - 32 mmol/L    Anion Gap 12 10 - 20 mmol/L    Urea Nitrogen 47 (H) 6 - 23 mg/dL    Creatinine 2.01 (H) 0.50 - 1.05 mg/dL    eGFR 24 (L) >60 mL/min/1.73m*2    Calcium 7.9 (L) 8.6 - 10.6 mg/dL    Phosphorus 4.7 2.5 - 4.9 mg/dL    Albumin 2.6 (L) 3.4 - 5.0 g/dL   Magnesium   Result Value Ref Range    Magnesium 1.98 1.60 - 2.40 mg/dL   CBC and Auto Differential   Result Value Ref Range    WBC 9.7 4.4 - 11.3 x10*3/uL    nRBC 0.0 0.0 - 0.0 /100 WBCs    RBC 2.24 (L) 4.00 - 5.20 x10*6/uL    Hemoglobin 7.2 (L) 12.0 - 16.0 g/dL    Hematocrit 23.5 (L) 36.0 - 46.0 %     (H) 80 - 100 fL    MCH 32.1 26.0 - 34.0 pg    MCHC 30.6 (L) 32.0 - 36.0 g/dL    RDW 14.0 11.5 - 14.5 %    Platelets 244 150 - 450 x10*3/uL    Neutrophils % 64.3 40.0 - 80.0 %    Immature Granulocytes %, Automated 0.7 0.0 - 0.9 %    Lymphocytes % 14.6 13.0 - 44.0 %    Monocytes % 14.9 2.0 - 10.0 %    Eosinophils % 5.1 0.0 - 6.0 %    Basophils % 0.4 0.0 - 2.0 %    Neutrophils Absolute 6.26 (H) 1.60 - 5.50 x10*3/uL    Immature Granulocytes Absolute, Automated 0.07 0.00 - 0.50 x10*3/uL    Lymphocytes Absolute 1.42 0.80 - 3.00 x10*3/uL    Monocytes Absolute 1.45 (H) 0.05 - 0.80 x10*3/uL    Eosinophils Absolute 0.50 (H) 0.00 - 0.40 x10*3/uL    Basophils Absolute 0.04 0.00 - 0.10 x10*3/uL        Assessment/Plan   Xin Fajardo is a 83 y.o. female with a past medical history of BCC s/p Mohs procedure (7/23) presented on 8/2/2024 with a surgical wound infection to the right cheek and was admitted for infection and constipation management. Dermatology was consulted for f/u of wound infection.    Differential diagnoses: Surgical site infection     Impression:  Based on examination today in comparison  to prior images, the infection is improving and there is ongoing wound healing. There is still erythema present around surgical site however less purulent drainage is present. Based on wound culture growing MSSA she has great coverage with her current antibiotic.    For her new left great toe pain, the clinical appearance is suggestive of a deeper pathology (I.e. possibly gout) and is not suspected to be skin-related. At this time we would recommend evaluation by primary team and rheumatology.      Recommendations:  - Continue with antibiotics per primary team  - Follow up with dermatology outpatient for wound dehiscence   - Refer to rheumatology for new left foot toe pain      The patient was seen and discussed with attending physician Dr. Mixon. The assessment and plan was communicated to the care team.    Thank you for the consultation and for the opportunity to contribute to the care of this patient.      Anna Moralez, MS4 ANDREW Mckinnon   PGY3, Dermatology  Epic chat (preferred)  Team pager [phone]     I, or a resident under my supervision, was present with the medical student who participated in the documentation of this note.  I have personally seen and examined the patient and performed the medical decision-making components. I have reviewed the medical student documentation and/or resident documentation and verified the findings in the note as written with additions or exceptions as stated in the body of the note.    Davis Mixon MD

## 2024-08-05 NOTE — CARE PLAN
The patient's goals for the shift include      The clinical goals for the shift include patient will remain safe and free from injury and falls throughout shift    Over the shift, the patient did not make progress toward the following goals. Barriers to progression include   Problem: Skin  Goal: Participates in plan/prevention/treatment measures  Outcome: Progressing     Problem: Skin  Goal: Promote/optimize nutrition  Outcome: Progressing     Problem: Skin  Goal: Promote skin healing  Outcome: Progressing     Problem: Infection related to problem list condition  Goal: Infection will resolve through treatment  Outcome: Progressing     Problem: Pain  Goal: Walks with improved pain control throughout the shift  Outcome: Progressing     Problem: Pain  Goal: Performs ADL's with improved pain control throughout shift  Outcome: Progressing     Problem: Safety - Adult  Goal: Free from fall injury  Outcome: Progressing     Problem: Discharge Planning  Goal: Discharge to home or other facility with appropriate resources  Outcome: Progressing   . Recommendations to address these barriers include .

## 2024-08-06 ENCOUNTER — PHARMACY VISIT (OUTPATIENT)
Dept: PHARMACY | Facility: CLINIC | Age: 84
End: 2024-08-06
Payer: COMMERCIAL

## 2024-08-06 ENCOUNTER — HOME HEALTH ADMISSION (OUTPATIENT)
Dept: HOME HEALTH SERVICES | Facility: HOME HEALTH | Age: 84
End: 2024-08-06
Payer: MEDICARE

## 2024-08-06 LAB
ABO GROUP (TYPE) IN BLOOD: NORMAL
ALBUMIN SERPL BCP-MCNC: 2.7 G/DL (ref 3.4–5)
ANION GAP SERPL CALC-SCNC: 16 MMOL/L (ref 10–20)
ANTIBODY SCREEN: NORMAL
BASOPHILS # BLD AUTO: 0.05 X10*3/UL (ref 0–0.1)
BASOPHILS NFR BLD AUTO: 0.5 %
BUN SERPL-MCNC: 48 MG/DL (ref 6–23)
CALCIUM SERPL-MCNC: 8.1 MG/DL (ref 8.6–10.6)
CHLORIDE SERPL-SCNC: 111 MMOL/L (ref 98–107)
CO2 SERPL-SCNC: 18 MMOL/L (ref 21–32)
CREAT SERPL-MCNC: 2.13 MG/DL (ref 0.5–1.05)
EGFRCR SERPLBLD CKD-EPI 2021: 22 ML/MIN/1.73M*2
EOSINOPHIL # BLD AUTO: 0.65 X10*3/UL (ref 0–0.4)
EOSINOPHIL NFR BLD AUTO: 6.4 %
ERYTHROCYTE [DISTWIDTH] IN BLOOD BY AUTOMATED COUNT: 15.8 % (ref 11.5–14.5)
GLUCOSE SERPL-MCNC: 123 MG/DL (ref 74–99)
HCT VFR BLD AUTO: 29.7 % (ref 36–46)
HGB BLD-MCNC: 9.2 G/DL (ref 12–16)
IMM GRANULOCYTES # BLD AUTO: 0.13 X10*3/UL (ref 0–0.5)
IMM GRANULOCYTES NFR BLD AUTO: 1.3 % (ref 0–0.9)
LYMPHOCYTES # BLD AUTO: 1.47 X10*3/UL (ref 0.8–3)
LYMPHOCYTES NFR BLD AUTO: 14.5 %
MAGNESIUM SERPL-MCNC: 1.88 MG/DL (ref 1.6–2.4)
MCH RBC QN AUTO: 31.4 PG (ref 26–34)
MCHC RBC AUTO-ENTMCNC: 31 G/DL (ref 32–36)
MCV RBC AUTO: 101 FL (ref 80–100)
MONOCYTES # BLD AUTO: 1.09 X10*3/UL (ref 0.05–0.8)
MONOCYTES NFR BLD AUTO: 10.7 %
NEUTROPHILS # BLD AUTO: 6.78 X10*3/UL (ref 1.6–5.5)
NEUTROPHILS NFR BLD AUTO: 66.6 %
NRBC BLD-RTO: 0 /100 WBCS (ref 0–0)
PHOSPHATE SERPL-MCNC: 4.2 MG/DL (ref 2.5–4.9)
PLATELET # BLD AUTO: 261 X10*3/UL (ref 150–450)
POTASSIUM SERPL-SCNC: 3.3 MMOL/L (ref 3.5–5.3)
RBC # BLD AUTO: 2.93 X10*6/UL (ref 4–5.2)
RH FACTOR (ANTIGEN D): NORMAL
SODIUM SERPL-SCNC: 142 MMOL/L (ref 136–145)
TSH SERPL-ACNC: 0.67 MIU/L (ref 0.44–3.98)
URATE SERPL-MCNC: 7.4 MG/DL (ref 2.3–6.7)
WBC # BLD AUTO: 10.2 X10*3/UL (ref 4.4–11.3)

## 2024-08-06 PROCEDURE — 83735 ASSAY OF MAGNESIUM: CPT

## 2024-08-06 PROCEDURE — 2500000001 HC RX 250 WO HCPCS SELF ADMINISTERED DRUGS (ALT 637 FOR MEDICARE OP)

## 2024-08-06 PROCEDURE — 2500000002 HC RX 250 W HCPCS SELF ADMINISTERED DRUGS (ALT 637 FOR MEDICARE OP, ALT 636 FOR OP/ED)

## 2024-08-06 PROCEDURE — RXMED WILLOW AMBULATORY MEDICATION CHARGE

## 2024-08-06 PROCEDURE — 80069 RENAL FUNCTION PANEL: CPT

## 2024-08-06 PROCEDURE — 2500000004 HC RX 250 GENERAL PHARMACY W/ HCPCS (ALT 636 FOR OP/ED)

## 2024-08-06 PROCEDURE — 99233 SBSQ HOSP IP/OBS HIGH 50: CPT | Performed by: INTERNAL MEDICINE

## 2024-08-06 PROCEDURE — 1100000001 HC PRIVATE ROOM DAILY

## 2024-08-06 PROCEDURE — 2500000004 HC RX 250 GENERAL PHARMACY W/ HCPCS (ALT 636 FOR OP/ED): Mod: JZ

## 2024-08-06 PROCEDURE — 85025 COMPLETE CBC W/AUTO DIFF WBC: CPT

## 2024-08-06 PROCEDURE — 86901 BLOOD TYPING SEROLOGIC RH(D): CPT

## 2024-08-06 PROCEDURE — 36415 COLL VENOUS BLD VENIPUNCTURE: CPT

## 2024-08-06 RX ORDER — SODIUM BICARBONATE 650 MG/1
650 TABLET ORAL 2 TIMES DAILY
Status: DISCONTINUED | OUTPATIENT
Start: 2024-08-06 | End: 2024-08-07 | Stop reason: HOSPADM

## 2024-08-06 RX ORDER — PREDNISONE 5 MG/1
10 TABLET ORAL DAILY
Status: DISCONTINUED | OUTPATIENT
Start: 2024-08-09 | End: 2024-08-07 | Stop reason: HOSPADM

## 2024-08-06 RX ORDER — POLYETHYLENE GLYCOL 3350 17 G/17G
17 POWDER, FOR SOLUTION ORAL DAILY
Qty: 510 G | Refills: 2 | Status: SHIPPED | OUTPATIENT
Start: 2024-08-06 | End: 2024-08-14 | Stop reason: WASHOUT

## 2024-08-06 RX ORDER — PREDNISONE 20 MG/1
40 TABLET ORAL DAILY
Status: COMPLETED | OUTPATIENT
Start: 2024-08-06 | End: 2024-08-06

## 2024-08-06 RX ORDER — FAMOTIDINE 20 MG/1
20 TABLET, FILM COATED ORAL DAILY
Qty: 30 TABLET | Refills: 2 | Status: SHIPPED | OUTPATIENT
Start: 2024-08-07 | End: 2024-11-05

## 2024-08-06 RX ORDER — SODIUM BICARBONATE 650 MG/1
650 TABLET ORAL 2 TIMES DAILY
Qty: 60 TABLET | Refills: 0 | Status: SHIPPED | OUTPATIENT
Start: 2024-08-06 | End: 2024-08-14 | Stop reason: SDUPTHER

## 2024-08-06 RX ORDER — CEPHALEXIN 500 MG/1
500 CAPSULE ORAL 3 TIMES DAILY
Qty: 24 CAPSULE | Refills: 0 | Status: SHIPPED | OUTPATIENT
Start: 2024-08-07 | End: 2024-08-15

## 2024-08-06 RX ORDER — PREDNISONE 20 MG/1
40 TABLET ORAL DAILY
Status: DISCONTINUED | OUTPATIENT
Start: 2024-08-06 | End: 2024-08-06

## 2024-08-06 RX ORDER — THYROID 60 MG/1
90 TABLET ORAL DAILY
Status: DISCONTINUED | OUTPATIENT
Start: 2024-08-06 | End: 2024-08-07 | Stop reason: HOSPADM

## 2024-08-06 RX ORDER — FAMOTIDINE 20 MG/1
20 TABLET, FILM COATED ORAL DAILY
Qty: 30 TABLET | Refills: 0 | Status: SHIPPED | OUTPATIENT
Start: 2024-08-07 | End: 2024-08-06

## 2024-08-06 RX ORDER — PREDNISONE 5 MG/1
10 TABLET ORAL DAILY
Status: DISCONTINUED | OUTPATIENT
Start: 2024-08-12 | End: 2024-08-06

## 2024-08-06 RX ORDER — PREDNISONE 10 MG/1
TABLET ORAL
Qty: 3 TABLET | Refills: 0 | Status: SHIPPED | OUTPATIENT
Start: 2024-08-08 | End: 2024-08-10

## 2024-08-06 RX ORDER — POTASSIUM CHLORIDE 20 MEQ/1
40 TABLET, EXTENDED RELEASE ORAL ONCE
Status: COMPLETED | OUTPATIENT
Start: 2024-08-06 | End: 2024-08-06

## 2024-08-06 RX ORDER — PREDNISONE 20 MG/1
20 TABLET ORAL DAILY
Status: DISCONTINUED | OUTPATIENT
Start: 2024-08-08 | End: 2024-08-07 | Stop reason: HOSPADM

## 2024-08-06 RX ORDER — POLYETHYLENE GLYCOL 3350 17 G/17G
17 POWDER, FOR SOLUTION ORAL DAILY PRN
Qty: 510 G | Refills: 0 | Status: SHIPPED | OUTPATIENT
Start: 2024-08-06 | End: 2024-08-06 | Stop reason: HOSPADM

## 2024-08-06 RX ORDER — MAGNESIUM SULFATE HEPTAHYDRATE 40 MG/ML
2 INJECTION, SOLUTION INTRAVENOUS ONCE
Status: COMPLETED | OUTPATIENT
Start: 2024-08-06 | End: 2024-08-06

## 2024-08-06 RX ORDER — PREDNISONE 20 MG/1
20 TABLET ORAL DAILY
Status: DISCONTINUED | OUTPATIENT
Start: 2024-08-10 | End: 2024-08-06

## 2024-08-06 RX ADMIN — PREDNISONE 40 MG: 20 TABLET ORAL at 09:47

## 2024-08-06 RX ADMIN — CARVEDILOL 6.25 MG: 12.5 TABLET, FILM COATED ORAL at 20:20

## 2024-08-06 RX ADMIN — MAGNESIUM SULFATE HEPTAHYDRATE 2 G: 40 INJECTION, SOLUTION INTRAVENOUS at 13:02

## 2024-08-06 RX ADMIN — SODIUM BICARBONATE 650 MG: 650 TABLET ORAL at 20:21

## 2024-08-06 RX ADMIN — CEFAZOLIN SODIUM 1 G: 1 INJECTION, SOLUTION INTRAVENOUS at 09:29

## 2024-08-06 RX ADMIN — MIRTAZAPINE 15 MG: 15 TABLET, FILM COATED ORAL at 20:21

## 2024-08-06 RX ADMIN — ACETAMINOPHEN 975 MG: 325 TABLET ORAL at 20:21

## 2024-08-06 RX ADMIN — SODIUM BICARBONATE 650 MG: 650 TABLET ORAL at 13:02

## 2024-08-06 RX ADMIN — FAMOTIDINE 20 MG: 20 TABLET ORAL at 09:52

## 2024-08-06 RX ADMIN — CARVEDILOL 6.25 MG: 12.5 TABLET, FILM COATED ORAL at 09:51

## 2024-08-06 RX ADMIN — CEFAZOLIN SODIUM 1 G: 1 INJECTION, SOLUTION INTRAVENOUS at 20:20

## 2024-08-06 RX ADMIN — LOSARTAN POTASSIUM 50 MG: 25 TABLET, FILM COATED ORAL at 09:51

## 2024-08-06 RX ADMIN — LEVOTHYROXINE, LIOTHYRONINE 90 MG: 38; 9 TABLET ORAL at 09:48

## 2024-08-06 RX ADMIN — Medication 2000 UNITS: at 05:49

## 2024-08-06 RX ADMIN — POTASSIUM CHLORIDE 40 MEQ: 1500 TABLET, EXTENDED RELEASE ORAL at 13:01

## 2024-08-06 RX ADMIN — Medication 5 MG: at 20:21

## 2024-08-06 ASSESSMENT — COGNITIVE AND FUNCTIONAL STATUS - GENERAL
WALKING IN HOSPITAL ROOM: A LITTLE
MOBILITY SCORE: 17
MOVING FROM LYING ON BACK TO SITTING ON SIDE OF FLAT BED WITH BEDRAILS: A LITTLE
TURNING FROM BACK TO SIDE WHILE IN FLAT BAD: A LITTLE
CLIMB 3 TO 5 STEPS WITH RAILING: A LOT
STANDING UP FROM CHAIR USING ARMS: A LITTLE
TOILETING: A LITTLE
MOVING TO AND FROM BED TO CHAIR: A LITTLE
DAILY ACTIVITIY SCORE: 23

## 2024-08-06 ASSESSMENT — PAIN - FUNCTIONAL ASSESSMENT
PAIN_FUNCTIONAL_ASSESSMENT: 0-10
PAIN_FUNCTIONAL_ASSESSMENT: 0-10

## 2024-08-06 ASSESSMENT — PAIN SCALES - GENERAL
PAINLEVEL_OUTOF10: 3
PAINLEVEL_OUTOF10: 4

## 2024-08-06 NOTE — PROGRESS NOTES
Prisca Fajardo is a 83 y.o. female on day 4 of admission presenting with Surgical wound infection.    Transitional Care Coordination Progress Note:  Patient discussed during interdisciplinary rounds.   Team members present: MD and TCC  Plan per Medical/Surgical team: Patient being treated for Gout on foot today. Will switch to PO antibiotics before discharge tomorrow.  Payor: Medicare  Discharge disposition: Home with [facility] for PT/OT  Potential Barriers: None  ADOD: 08/07/24      BONNIE TORRES RN

## 2024-08-06 NOTE — CARE PLAN
The patient's goals for the shift include   Pt will remain without falls in a safe environment throughout the shift. 8/6/24 19.00    Over the shift, the patient did make progress toward the aforementioned goals.

## 2024-08-06 NOTE — PROGRESS NOTES
"Xin Fajardo \"Prisca\" is a 83 y.o. female who is hospital day 4 with PMH of ongoing extensive workup for 7 months of \"failure to thrive,\" remote colon + lung cancer, complex abdominal surgical hx, hemorrhoids, papillary thyroid cancer s/p thyroidectomy, chronic diastolic heart failure, mitral regurgitation (moderate per TTE 2022, HTN, HLD, stage IIIb CKD, osteoporosis, macular degeneration,  presenting s/p right cheek Mohs surgery (7/23) now with surgical site infection.    Subjective   Interval Events:   NAEON.     This morning:   Seen resting in bed in no acute distress. Patient states she has been having consistent bowel movements and right cheek is less tender than prior. Patient states that as of last night, her left big toe has been painful, warm, and swollen. She states that she has a remote history of gout and that this feels similar to her last gout flare. Patient is denying any new pain, SOB, chest pain, constipation.    =========  Scheduled Medications  Scheduled medications  carvedilol, 6.25 mg, oral, BID  ceFAZolin, 1 g, intravenous, q12h  cholecalciferol, 2,000 Units, oral, Daily  famotidine, 20 mg, oral, Daily  heparin (porcine), 5,000 Units, subcutaneous, q8h  lidocaine, 5 mL, infiltration, Once  lidocaine, 5 mL, infiltration, Once  losartan, 50 mg, oral, Daily  mirtazapine, 15 mg, oral, Nightly  polyethylene glycol, 17 g, oral, Daily  [START ON 8/7/2024] predniSONE, 30 mg, oral, Daily   Followed by  [START ON 8/8/2024] predniSONE, 20 mg, oral, Daily   Followed by  [START ON 8/9/2024] predniSONE, 10 mg, oral, Daily  sodium bicarbonate, 650 mg, oral, BID  thyroid (pork), 90 mg, oral, Daily  [Held by provider] torsemide, 20 mg, oral, Daily      Continuous medications       PRN medications  PRN medications: acetaminophen **OR** [DISCONTINUED] acetaminophen **OR** [DISCONTINUED] acetaminophen, alum-mag hydroxide-simeth, lidocaine, melatonin, ondansetron **OR** ondansetron, phenyleph-min " "oil-petrolatum, witch hazel    Continuous Medications         PRN Medications  PRN medications: acetaminophen **OR** [DISCONTINUED] acetaminophen **OR** [DISCONTINUED] acetaminophen, alum-mag hydroxide-simeth, lidocaine, melatonin, ondansetron **OR** ondansetron, phenyleph-min oil-petrolatum, witch hazel    Objective   Vital Signs  /75 (BP Location: Left arm, Patient Position: Lying)   Pulse 72   Temp 36.6 °C (97.9 °F) (Temporal)   Resp 16   Ht 1.473 m (4' 10\")   Wt (!) 43.1 kg (95 lb)   SpO2 99%   BMI 19.86 kg/m²     Physical Exam   GENERAL: laying in bed comfortably; well-appearing and in NAD.  EYES: no conjunctival injection, lesions, or scleral icterus.  ENT: no discharge or bleeding; moist mucous membranes; hearing intact to conversational tone.  HEART: RRR; audible S1 and S2; no extra heart sounds, murmurs, gallops, rubs, or knocks.  CHEST: CTAB; audible vesicular breath sounds throughout the bilateral lung fields. Breathing is even and unlabored. No wheezes, ronchi, rales.   ABDOMINAL: abdomen diffusely tender to palpation across all quadrants; no rebound tenderness, guarding  EXTREMITIES: BLE edema up to the mid calf; areas of ecchymosis appreciated on bilateral anterior calves; no tender to palpation  INTEGUMENT: warm, dry, elastic, and intact. Normal turgor.  PSYCHIATRIC/MSE: conversational; full euthymic affect; logical thought process; cognition intact.  NEUROLOGICAL: A&O to self, date, location. Language expression, naming, and repetition intact. Remains focused during interview.           Recent/Relevant Labs:  Results for orders placed or performed during the hospital encounter of 08/02/24 (from the past 96 hour(s))   Renal function panel   Result Value Ref Range    Glucose 92 74 - 99 mg/dL    Sodium 139 136 - 145 mmol/L    Potassium 4.4 3.5 - 5.3 mmol/L    Chloride 119 (H) 98 - 107 mmol/L    Bicarbonate 8 (LL) 21 - 32 mmol/L    Anion Gap 16 10 - 20 mmol/L    Urea Nitrogen 55 (H) 6 - 23 " mg/dL    Creatinine 1.76 (H) 0.50 - 1.05 mg/dL    eGFR 28 (L) >60 mL/min/1.73m*2    Calcium 9.3 8.6 - 10.6 mg/dL    Phosphorus 4.3 2.5 - 4.9 mg/dL    Albumin 3.4 3.4 - 5.0 g/dL   CBC and Auto Differential   Result Value Ref Range    WBC 11.9 (H) 4.4 - 11.3 x10*3/uL    nRBC 0.0 0.0 - 0.0 /100 WBCs    RBC 2.69 (L) 4.00 - 5.20 x10*6/uL    Hemoglobin 8.6 (L) 12.0 - 16.0 g/dL    Hematocrit 28.7 (L) 36.0 - 46.0 %     (H) 80 - 100 fL    MCH 32.0 26.0 - 34.0 pg    MCHC 30.0 (L) 32.0 - 36.0 g/dL    RDW 13.9 11.5 - 14.5 %    Platelets 299 150 - 450 x10*3/uL    Neutrophils % 71.6 40.0 - 80.0 %    Immature Granulocytes %, Automated 0.7 0.0 - 0.9 %    Lymphocytes % 13.0 13.0 - 44.0 %    Monocytes % 11.6 2.0 - 10.0 %    Eosinophils % 2.7 0.0 - 6.0 %    Basophils % 0.4 0.0 - 2.0 %    Neutrophils Absolute 8.54 (H) 1.60 - 5.50 x10*3/uL    Immature Granulocytes Absolute, Automated 0.08 0.00 - 0.50 x10*3/uL    Lymphocytes Absolute 1.55 0.80 - 3.00 x10*3/uL    Monocytes Absolute 1.38 (H) 0.05 - 0.80 x10*3/uL    Eosinophils Absolute 0.32 0.00 - 0.40 x10*3/uL    Basophils Absolute 0.05 0.00 - 0.10 x10*3/uL   Magnesium   Result Value Ref Range    Magnesium 1.72 1.60 - 2.40 mg/dL   Ferritin   Result Value Ref Range    Ferritin 292 (H) 8 - 150 ng/mL   Iron and TIBC   Result Value Ref Range    Iron 42 35 - 150 ug/dL    UIBC 174 110 - 370 ug/dL    TIBC 216 (L) 240 - 445 ug/dL    % Saturation 19 (L) 25 - 45 %   Reticulocytes   Result Value Ref Range    Retic % 2.4 (H) 0.5 - 2.0 %    Retic Absolute 0.064 0.017 - 0.110 x10*6/uL    Reticulocyte Hemoglobin 34 28 - 38 pg    Immature Retic fraction 14.1 <=16.0 %   Lactate   Result Value Ref Range    Lactate 0.9 0.4 - 2.0 mmol/L   BLOOD GAS VENOUS FULL PANEL   Result Value Ref Range    POCT pH, Venous 7.19 (LL) 7.33 - 7.43 pH    POCT pCO2, Venous 25 (L) 41 - 51 mm Hg    POCT pO2, Venous 15 (L) 35 - 45 mm Hg    POCT SO2, Venous 24 (L) 45 - 75 %    POCT Oxy Hemoglobin, Venous 24.1 (L) 45.0 -  75.0 %    POCT Hematocrit Calculated, Venous 31.0 (L) 36.0 - 46.0 %    POCT Sodium, Venous 139 136 - 145 mmol/L    POCT Potassium, Venous 3.8 3.5 - 5.3 mmol/L    POCT Chloride, Venous 116 (H) 98 - 107 mmol/L    POCT Ionized Calicum, Venous 1.40 (H) 1.10 - 1.33 mmol/L    POCT Glucose, Venous 150 (H) 74 - 99 mg/dL    POCT Lactate, Venous 0.9 0.4 - 2.0 mmol/L    POCT Base Excess, Venous -17.1 (L) -2.0 - 3.0 mmol/L    POCT HCO3 Calculated, Venous 9.5 (L) 22.0 - 26.0 mmol/L    POCT Hemoglobin, Venous 10.2 (L) 12.0 - 16.0 g/dL    POCT Anion Gap, Venous 17.0 10.0 - 25.0 mmol/L    Patient Temperature 37.0 degrees Celsius    FiO2 0 %   LYME (B. BURGDORFERI) AB MODIFIED 2-TITER TESTING, WITH REFLEX TO IGM AND IGG BY MARYSOL   Result Value Ref Range    B. burgdorferi VlsE1/pepC10 Abs, MARYSOL 0.12 <=0.90 IV   Vitamin B12   Result Value Ref Range    Vitamin B12 376 211 - 911 pg/mL   Folate   Result Value Ref Range    Folate, Serum 20.5 >5.0 ng/mL   CBC and Auto Differential   Result Value Ref Range    WBC 10.9 4.4 - 11.3 x10*3/uL    nRBC 0.0 0.0 - 0.0 /100 WBCs    RBC 2.51 (L) 4.00 - 5.20 x10*6/uL    Hemoglobin 8.2 (L) 12.0 - 16.0 g/dL    Hematocrit 26.9 (L) 36.0 - 46.0 %     (H) 80 - 100 fL    MCH 32.7 26.0 - 34.0 pg    MCHC 30.5 (L) 32.0 - 36.0 g/dL    RDW 13.9 11.5 - 14.5 %    Platelets 244 150 - 450 x10*3/uL    Neutrophils % 72.8 40.0 - 80.0 %    Immature Granulocytes %, Automated 0.5 0.0 - 0.9 %    Lymphocytes % 10.0 13.0 - 44.0 %    Monocytes % 12.1 2.0 - 10.0 %    Eosinophils % 4.1 0.0 - 6.0 %    Basophils % 0.5 0.0 - 2.0 %    Neutrophils Absolute 7.92 (H) 1.60 - 5.50 x10*3/uL    Immature Granulocytes Absolute, Automated 0.05 0.00 - 0.50 x10*3/uL    Lymphocytes Absolute 1.09 0.80 - 3.00 x10*3/uL    Monocytes Absolute 1.32 (H) 0.05 - 0.80 x10*3/uL    Eosinophils Absolute 0.45 (H) 0.00 - 0.40 x10*3/uL    Basophils Absolute 0.05 0.00 - 0.10 x10*3/uL   Renal Function Panel   Result Value Ref Range    Glucose 84 74 - 99  mg/dL    Sodium 139 136 - 145 mmol/L    Potassium 4.3 3.5 - 5.3 mmol/L    Chloride 118 (H) 98 - 107 mmol/L    Bicarbonate 9 (LL) 21 - 32 mmol/L    Anion Gap 16 10 - 20 mmol/L    Urea Nitrogen 51 (H) 6 - 23 mg/dL    Creatinine 1.80 (H) 0.50 - 1.05 mg/dL    eGFR 27 (L) >60 mL/min/1.73m*2    Calcium 8.6 8.6 - 10.6 mg/dL    Phosphorus 3.7 2.5 - 4.9 mg/dL    Albumin 2.8 (L) 3.4 - 5.0 g/dL   Magnesium   Result Value Ref Range    Magnesium 1.54 (L) 1.60 - 2.40 mg/dL   Calcium, ionized   Result Value Ref Range    POCT Calcium, Ionized 1.40 (H) 1.1 - 1.33 mmol/L   Transferrin   Result Value Ref Range    Transferrin 152 (L) 200 - 360 mg/dL   Type And Screen   Result Value Ref Range    ABO TYPE A     Rh TYPE POS     ANTIBODY SCREEN NEG    Comprehensive Metabolic Panel   Result Value Ref Range    Glucose 138 (H) 74 - 99 mg/dL    Sodium 138 136 - 145 mmol/L    Potassium 3.5 3.5 - 5.3 mmol/L    Chloride 113 (H) 98 - 107 mmol/L    Bicarbonate 14 (L) 21 - 32 mmol/L    Anion Gap 15 10 - 20 mmol/L    Urea Nitrogen 43 (H) 6 - 23 mg/dL    Creatinine 1.78 (H) 0.50 - 1.05 mg/dL    eGFR 28 (L) >60 mL/min/1.73m*2    Calcium 8.4 (L) 8.6 - 10.6 mg/dL    Albumin 2.7 (L) 3.4 - 5.0 g/dL    Alkaline Phosphatase 41 33 - 136 U/L    Total Protein 4.6 (L) 6.4 - 8.2 g/dL    AST 14 9 - 39 U/L    Bilirubin, Total 0.2 0.0 - 1.2 mg/dL    ALT 8 7 - 45 U/L   Magnesium   Result Value Ref Range    Magnesium 2.60 (H) 1.60 - 2.40 mg/dL   Vancomycin   Result Value Ref Range    Vancomycin 12.0 5.0 - 20.0 ug/mL   Urine electrolytes   Result Value Ref Range    Sodium, Urine Random 81 mmol/L    Sodium/Creatinine Ratio 277 Not established. mmol/g Creat    Potassium, Urine Random 28 mmol/L    Potassium/Creatinine Ratio 96 Not established mmol/g Creat    Chloride, Urine Random 102 mmol/L    Chloride/Creatinine Ratio 349 (H) 38 - 318 mmol/g creat    Creatinine, Urine Random 29.2 20.0 - 320.0 mg/dL   Urinalysis with Reflex Microscopic   Result Value Ref Range    Color,  Urine Light-Yellow Light-Yellow, Yellow, Dark-Yellow    Appearance, Urine Turbid (N) Clear    Specific Gravity, Urine 1.010 1.005 - 1.035    pH, Urine 5.0 5.0, 5.5, 6.0, 6.5, 7.0, 7.5, 8.0    Protein, Urine NEGATIVE NEGATIVE, 10 (TRACE), 20 (TRACE) mg/dL    Glucose, Urine Normal Normal mg/dL    Blood, Urine 0.2 (2+) (A) NEGATIVE    Ketones, Urine NEGATIVE NEGATIVE mg/dL    Bilirubin, Urine NEGATIVE NEGATIVE    Urobilinogen, Urine Normal Normal mg/dL    Nitrite, Urine NEGATIVE NEGATIVE    Leukocyte Esterase, Urine 500 Leta/µL (A) NEGATIVE   Microscopic Only, Urine   Result Value Ref Range    WBC, Urine >50 (A) 1-5, NONE /HPF    WBC Clumps, Urine OCCASIONAL Reference range not established. /HPF    RBC, Urine >20 (A) NONE, 1-2, 3-5 /HPF    Mucus, Urine FEW Reference range not established. /LPF   Urea Nitrogen, Urine Random   Result Value Ref Range    Urea Nitrogen, Urine Random 211 mg/dL    Creatinine, Urine Random 28.2 20.0 - 320.0 mg/dL    Urea Nitrogen/Creatinine Ratio 7.5 Not established. g/g creat   CBC and Auto Differential   Result Value Ref Range    WBC 8.8 4.4 - 11.3 x10*3/uL    nRBC 0.0 0.0 - 0.0 /100 WBCs    RBC 2.25 (L) 4.00 - 5.20 x10*6/uL    Hemoglobin 7.3 (L) 12.0 - 16.0 g/dL    Hematocrit 22.9 (L) 36.0 - 46.0 %     (H) 80 - 100 fL    MCH 32.4 26.0 - 34.0 pg    MCHC 31.9 (L) 32.0 - 36.0 g/dL    RDW 13.6 11.5 - 14.5 %    Platelets 235 150 - 450 x10*3/uL    Neutrophils % 58.5 40.0 - 80.0 %    Immature Granulocytes %, Automated 0.7 0.0 - 0.9 %    Lymphocytes % 20.6 13.0 - 44.0 %    Monocytes % 14.0 2.0 - 10.0 %    Eosinophils % 5.7 0.0 - 6.0 %    Basophils % 0.5 0.0 - 2.0 %    Neutrophils Absolute 5.18 1.60 - 5.50 x10*3/uL    Immature Granulocytes Absolute, Automated 0.06 0.00 - 0.50 x10*3/uL    Lymphocytes Absolute 1.82 0.80 - 3.00 x10*3/uL    Monocytes Absolute 1.24 (H) 0.05 - 0.80 x10*3/uL    Eosinophils Absolute 0.50 (H) 0.00 - 0.40 x10*3/uL    Basophils Absolute 0.04 0.00 - 0.10 x10*3/uL   Renal  Function Panel   Result Value Ref Range    Glucose 93 74 - 99 mg/dL    Sodium 140 136 - 145 mmol/L    Potassium 3.1 (L) 3.5 - 5.3 mmol/L    Chloride 108 (H) 98 - 107 mmol/L    Bicarbonate 23 21 - 32 mmol/L    Anion Gap 12 10 - 20 mmol/L    Urea Nitrogen 44 (H) 6 - 23 mg/dL    Creatinine 1.72 (H) 0.50 - 1.05 mg/dL    eGFR 29 (L) >60 mL/min/1.73m*2    Calcium 7.7 (L) 8.6 - 10.6 mg/dL    Phosphorus 3.8 2.5 - 4.9 mg/dL    Albumin 2.5 (L) 3.4 - 5.0 g/dL   Magnesium   Result Value Ref Range    Magnesium 2.07 1.60 - 2.40 mg/dL   Renal Function Panel   Result Value Ref Range    Glucose 91 74 - 99 mg/dL    Sodium 143 136 - 145 mmol/L    Potassium 3.6 3.5 - 5.3 mmol/L    Chloride 113 (H) 98 - 107 mmol/L    Bicarbonate 22 21 - 32 mmol/L    Anion Gap 12 10 - 20 mmol/L    Urea Nitrogen 47 (H) 6 - 23 mg/dL    Creatinine 2.01 (H) 0.50 - 1.05 mg/dL    eGFR 24 (L) >60 mL/min/1.73m*2    Calcium 7.9 (L) 8.6 - 10.6 mg/dL    Phosphorus 4.7 2.5 - 4.9 mg/dL    Albumin 2.6 (L) 3.4 - 5.0 g/dL   Magnesium   Result Value Ref Range    Magnesium 1.98 1.60 - 2.40 mg/dL   Uric Acid   Result Value Ref Range    Uric Acid 7.4 (H) 2.3 - 6.7 mg/dL   CBC and Auto Differential   Result Value Ref Range    WBC 9.7 4.4 - 11.3 x10*3/uL    nRBC 0.0 0.0 - 0.0 /100 WBCs    RBC 2.24 (L) 4.00 - 5.20 x10*6/uL    Hemoglobin 7.2 (L) 12.0 - 16.0 g/dL    Hematocrit 23.5 (L) 36.0 - 46.0 %     (H) 80 - 100 fL    MCH 32.1 26.0 - 34.0 pg    MCHC 30.6 (L) 32.0 - 36.0 g/dL    RDW 14.0 11.5 - 14.5 %    Platelets 244 150 - 450 x10*3/uL    Neutrophils % 64.3 40.0 - 80.0 %    Immature Granulocytes %, Automated 0.7 0.0 - 0.9 %    Lymphocytes % 14.6 13.0 - 44.0 %    Monocytes % 14.9 2.0 - 10.0 %    Eosinophils % 5.1 0.0 - 6.0 %    Basophils % 0.4 0.0 - 2.0 %    Neutrophils Absolute 6.26 (H) 1.60 - 5.50 x10*3/uL    Immature Granulocytes Absolute, Automated 0.07 0.00 - 0.50 x10*3/uL    Lymphocytes Absolute 1.42 0.80 - 3.00 x10*3/uL    Monocytes Absolute 1.45 (H) 0.05 - 0.80  x10*3/uL    Eosinophils Absolute 0.50 (H) 0.00 - 0.40 x10*3/uL    Basophils Absolute 0.04 0.00 - 0.10 x10*3/uL   Prepare RBC: 1 Units   Result Value Ref Range    PRODUCT CODE U2885H43     Unit Number [id number]     Unit ABO A     Unit RH POS     XM INTEP COMP     Dispense Status TR     Blood Expiration Date 8/17/2024 11:59:00 PM EDT     PRODUCT BLOOD TYPE 6200     UNIT VOLUME 350    Type And Screen   Result Value Ref Range    ABO TYPE A     Rh TYPE POS     ANTIBODY SCREEN NEG    CBC and Auto Differential   Result Value Ref Range    WBC 10.2 4.4 - 11.3 x10*3/uL    nRBC 0.0 0.0 - 0.0 /100 WBCs    RBC 2.93 (L) 4.00 - 5.20 x10*6/uL    Hemoglobin 9.2 (L) 12.0 - 16.0 g/dL    Hematocrit 29.7 (L) 36.0 - 46.0 %     (H) 80 - 100 fL    MCH 31.4 26.0 - 34.0 pg    MCHC 31.0 (L) 32.0 - 36.0 g/dL    RDW 15.8 (H) 11.5 - 14.5 %    Platelets 261 150 - 450 x10*3/uL    Neutrophils % 66.6 40.0 - 80.0 %    Immature Granulocytes %, Automated 1.3 (H) 0.0 - 0.9 %    Lymphocytes % 14.5 13.0 - 44.0 %    Monocytes % 10.7 2.0 - 10.0 %    Eosinophils % 6.4 0.0 - 6.0 %    Basophils % 0.5 0.0 - 2.0 %    Neutrophils Absolute 6.78 (H) 1.60 - 5.50 x10*3/uL    Immature Granulocytes Absolute, Automated 0.13 0.00 - 0.50 x10*3/uL    Lymphocytes Absolute 1.47 0.80 - 3.00 x10*3/uL    Monocytes Absolute 1.09 (H) 0.05 - 0.80 x10*3/uL    Eosinophils Absolute 0.65 (H) 0.00 - 0.40 x10*3/uL    Basophils Absolute 0.05 0.00 - 0.10 x10*3/uL   Magnesium   Result Value Ref Range    Magnesium 1.88 1.60 - 2.40 mg/dL   Renal Function Panel   Result Value Ref Range    Glucose 123 (H) 74 - 99 mg/dL    Sodium 142 136 - 145 mmol/L    Potassium 3.3 (L) 3.5 - 5.3 mmol/L    Chloride 111 (H) 98 - 107 mmol/L    Bicarbonate 18 (L) 21 - 32 mmol/L    Anion Gap 16 10 - 20 mmol/L    Urea Nitrogen 48 (H) 6 - 23 mg/dL    Creatinine 2.13 (H) 0.50 - 1.05 mg/dL    eGFR 22 (L) >60 mL/min/1.73m*2    Calcium 8.1 (L) 8.6 - 10.6 mg/dL    Phosphorus 4.2 2.5 - 4.9 mg/dL    Albumin  "2.7 (L) 3.4 - 5.0 g/dL            BNP   Date/Time Value Ref Range Status   06/04/2024 01:37 PM 72 0 - 99 pg/mL Final     Recent/Relevant Imaging:  - extensive outpatient imaging for \"failure to thrive\" workup  - no imagining on current admission    Other Recent/Relevant Studies:  - 8/1 - wound culture MSSA positive at 72 hrs    Assessment and Plan   Assessment   Xin Fajardo \"Prisca\" is a 83 y.o. female who is hospital day 4 with PMH of ongoing extensive workup for 7 months of \"failure to thrive,\" remote colon + lung cancer, complex abdominal surgical hx, hemorrhoids, papillary thyroid cancer s/p thyroidectomy, chronic diastolic heart failure, mitral regurgitation (moderate per TTE 2022, HTN, HLD, stage IIIb CKD, osteoporosis, macular degeneration,  presenting s/p right cheek Mohs surgery (7/23) now with surgical site infection and severe constipation.     Wound culture obtained in dermatology clinic 8/1. Will plan to follow-up results for abx decision making. Pending results, will treat with vanc/zosyn to cover for MRSA and possible pseudomonas given recent surgery.     Given complex abdominal surgical hx, currently suspect constipation is 2/2 to chronic colonic dilation, colonic adhesions, and possible gastroparesis i/s/o acute infection. Given increased abdominal pain from yesterday, will plan dulcolax suppository in addition to Miralax/senna. Will also give preparation H and Tucks for hemorrhoid pain. Will continue to monitor and escalate as required. Given benign abdominal exam, not currently worried about obstruction or need for urgent surgical intervention.     Updates 8/6:  - continue to monitor facial wound; still has dehiscence and drainage but no pain - derm will set up a follow-up appointment  - Daughter to set-up PT home care with support from [facility] - has been emailed PT home care orders  - start prednisone taper for gout (40mg 8/6, 30mg 8/7, 20mg 8/8, 10mg 8/9)  - received blood transfusion yesterday " "with improvement in Hgb (7.2 -> 9.3)  - plan to discharge tomorrow (8/7) on 2 weeks Keflex + time on IV (8/2-8/16)    Problem-Based Plan   #surgical site infection  :: Mohs surgery on 7/23/24  :: purulence noted and doxycyline 100mg BID started on 7/30 for 7 days; pt stopped after 1.5 days  :: 8/1 wound culture MSSA at 72 hrs  Plan  - continue IV cefazolin and discharge on Keflex (total course: 8/2-8/16)  - monitor erythema margins  - derm to schedule follow-up appointment    #non-anion gap metabolic acidosis  #anemia  #stage IIIb CKD  #reported ~7 months increased fatigue, \"failure to thrive\"  :: likely 2/2 CKD w/ possible contribution from renal tubular acidosis likely 2/2 antibiotics vs other etiology  :: team told by daughter that 7 months ago, bicarb was normal  :: Bicarb - 11 (6/4) -> 11 (6/24) -> 8 (8/2) -> 9 (8/3) -> 14 (8/3 PM) -> 23 (8/4) -> 22 (8/5) -> 18 (8/6)  :: lactate - 0.9 (8/2)  :: Hgb: 10.1 (6/7) -> 8.6 (8/2) -> 8.2 (8/3)  :: 8/3 - folate: 20.5; B12: 376  :: 8/3 - transferrin: 152 (low); TIBC: 216 (low); iron: 42 (normal: ) ; ferritin: 292 (high)  :: 8/3 - retic absolute: 0.065; retic %: 2.4; retic index 1.02 (hypoproliferation <2)  :: vBG (8/3) - CO2: 25; pH: 7.19  :: s/p 500mL LR bolus 8/2  :: 8/3 - no evidence of dyspnea or altered mental status on AM exam  :: 8/5 PM - received 1 unit of blood (Hgb: 7.2-9.3)  Plan  - continue to monitor bicarb and hemoglobin  - start bicarb PO i/s/o decreased bicarb    #constipation (resolved)  #hemorrhoids  #abdominal discomfort and loss of appetitie  :: Differential: chronic colonic dialtion vs colonic adhesions vs gastroparesis vs drug interaction from doxy  Plan  - restart Miralax  - continue to monitor  - continue Mirtazapine 15mg nightly  - continue Famotidine 20mg daily  - continue Mylanta     #left great toe gout flare  - has PMH of gout flares w/ erythema and swelling of left great toe  Plan  - prednisone taper (40mg 8/6, 30mg 8/7, 20mg 8/8, 10mg " 8/9)    Chronic Conditions  #HTN: continue home carvedilol 6.25 mg BID; continue home losartan 50mg daily  #hypothyroidism: continue home armour thyroid 90mg daily  #chronic diastolic heart failure: hold home torsemide 20mg daily  #right shoulder pain from prior injury: Lidocaine patch PRN (12 hr on, 12 hr off)     F: Replete PRN  E: Replete PRN  N: Regular Diet  DVT Ppx: Heparin SubQ, SCDs  GI Ppx: N/A  Access/Lines: Midline  Abx: Cefazolin  O2: None     Pain regimen: Tylenol 975mg PRN q4; for R shoulder - lidocaine patch; For hemorrhoids - Tucks QID PRN and Preparation H QID PRN  Nausea regimen: Zofran 4mg PRN q8  Bowel regimen: Miralax  Discharge plan: pending PT set-up at home; one more day of IV antibiotics; check for response to steroids     Code status: Full Code  Emergency contact: Michelle Casarez (daughter) [phone]    Jenny Hauser MSY4  Internal Medicine  [place] pager: [phone]

## 2024-08-06 NOTE — CONSULTS
Wound Care Consult     Visit Date: 8/6/2024      Patient Name: Prisca Fajardo         MRN: [id number]           YOB: 1940     Reason for Consult: assess sacrum and Left cheek wound              Pertinent Labs:   Albumin   Date Value Ref Range Status   08/06/2024 2.7 (L) 3.4 - 5.0 g/dL Final       Wound Assessment:  Wound 08/02/24 Face Right (Active)   Wound Image   08/06/24 1344   Wound Length (cm) 3.2 cm 08/06/24 1344   Wound Width (cm) 1 cm 08/06/24 1344   Wound Surface Area (cm^2) 3.2 cm^2 08/06/24 1344   Wound Depth (cm) 0.5 cm 08/06/24 1344   Wound Volume (cm^3) 1.6 cm^3 08/06/24 1344   Wound Healing % 87 08/06/24 1344   Drainage Description Purulent;Tan 08/06/24 1344   Drainage Amount Small 08/06/24 1344   Dressing Dry dressing 08/06/24 1344   Dressing Changed New 08/06/24 1344   Dressing Status Clean 08/06/24 1344       Wound 08/03/24 Pressure Injury Sacrum Mid (Active)   Wound Image   08/06/24 1328   Site Assessment Red;Sloughing;Yellow 08/06/24 1328   Marita-Wound Assessment Dark edges 08/06/24 1328   Non-staged Wound Description Partial thickness 08/06/24 1328   Pressure Injury Stage 3 08/06/24 1328   Wound Length (cm) 1.4 cm 08/06/24 1328   Wound Width (cm) 0.3 cm 08/06/24 1328   Wound Surface Area (cm^2) 0.42 cm^2 08/06/24 1328   Wound Depth (cm) 0.3 cm 08/06/24 1328   Wound Volume (cm^3) 0.126 cm^3 08/06/24 1328   Drainage Description Serous 08/06/24 1328   Drainage Amount Scant 08/06/24 1328   Dressing Hydrofiber;Silicone border dressing 08/06/24 1328   Dressing Changed New 08/06/24 1328   Dressing Status Clean 08/06/24 1328       Wound Team Summary Assessment:     Recommendations:   Cleanse wound with Vashe   Apply Aquacel AG  Cover with Meplex sacral dressing   Apply EHOB waffle mattress to bed surface  Provide EHOB waffle seat cushion      Recommendations for facial wound:   Cleanse wound with Vashe   Cover with 2x2 dressing using minimal paper tape.  Remove with [place] Adhesive  remover   DO NOT put Mepilex over wound      Wound Team Plan: Please review recommendations.      Mayelin MICHELE   8/6/2024  6:18 PM

## 2024-08-07 ENCOUNTER — APPOINTMENT (OUTPATIENT)
Dept: PAIN MEDICINE | Facility: CLINIC | Age: 84
End: 2024-08-07
Payer: MEDICARE

## 2024-08-07 ENCOUNTER — DOCUMENTATION (OUTPATIENT)
Dept: HOME HEALTH SERVICES | Facility: HOME HEALTH | Age: 84
End: 2024-08-07

## 2024-08-07 VITALS
TEMPERATURE: 97.5 F | SYSTOLIC BLOOD PRESSURE: 142 MMHG | DIASTOLIC BLOOD PRESSURE: 60 MMHG | BODY MASS INDEX: 19.94 KG/M2 | HEART RATE: 70 BPM | RESPIRATION RATE: 18 BRPM | HEIGHT: 58 IN | OXYGEN SATURATION: 99 % | WEIGHT: 95 LBS

## 2024-08-07 PROBLEM — L03.211 FACIAL CELLULITIS: Status: RESOLVED | Noted: 2024-08-03 | Resolved: 2024-08-07

## 2024-08-07 PROBLEM — L03.90 CELLULITIS: Status: RESOLVED | Noted: 2024-08-02 | Resolved: 2024-08-07

## 2024-08-07 PROBLEM — T81.49XA SURGICAL WOUND INFECTION: Status: RESOLVED | Noted: 2024-08-03 | Resolved: 2024-08-07

## 2024-08-07 LAB
ACID FAST STN SPEC: NORMAL
ALBUMIN SERPL BCP-MCNC: 2.8 G/DL (ref 3.4–5)
ANION GAP BLDV CALCULATED.4IONS-SCNC: 14 MMOL/L (ref 10–25)
ANION GAP SERPL CALC-SCNC: 12 MMOL/L (ref 10–20)
BASE EXCESS BLDV CALC-SCNC: -8.3 MMOL/L (ref -2–3)
BASOPHILS # BLD AUTO: 0.03 X10*3/UL (ref 0–0.1)
BASOPHILS NFR BLD AUTO: 0.2 %
BODY TEMPERATURE: 37 DEGREES CELSIUS
BUN SERPL-MCNC: 44 MG/DL (ref 6–23)
CA-I BLDV-SCNC: 1.21 MMOL/L (ref 1.1–1.33)
CALCIUM SERPL-MCNC: 8.2 MG/DL (ref 8.6–10.6)
CHLORIDE BLDV-SCNC: 114 MMOL/L (ref 98–107)
CHLORIDE SERPL-SCNC: 115 MMOL/L (ref 98–107)
CO2 SERPL-SCNC: 22 MMOL/L (ref 21–32)
CREAT SERPL-MCNC: 1.8 MG/DL (ref 0.5–1.05)
EGFRCR SERPLBLD CKD-EPI 2021: 27 ML/MIN/1.73M*2
EOSINOPHIL # BLD AUTO: 0.01 X10*3/UL (ref 0–0.4)
EOSINOPHIL NFR BLD AUTO: 0.1 %
ERYTHROCYTE [DISTWIDTH] IN BLOOD BY AUTOMATED COUNT: 15.5 % (ref 11.5–14.5)
GLUCOSE BLDV-MCNC: 116 MG/DL (ref 74–99)
GLUCOSE SERPL-MCNC: 103 MG/DL (ref 74–99)
HCO3 BLDV-SCNC: 17 MMOL/L (ref 22–26)
HCT VFR BLD AUTO: 30.4 % (ref 36–46)
HCT VFR BLD EST: 25 % (ref 36–46)
HGB BLD-MCNC: 9.5 G/DL (ref 12–16)
HGB BLDV-MCNC: 8.3 G/DL (ref 12–16)
IMM GRANULOCYTES # BLD AUTO: 0.14 X10*3/UL (ref 0–0.5)
IMM GRANULOCYTES NFR BLD AUTO: 1.1 % (ref 0–0.9)
INHALED O2 CONCENTRATION: 21 %
LACTATE BLDV-SCNC: 2.2 MMOL/L (ref 0.4–2)
LYMPHOCYTES # BLD AUTO: 0.99 X10*3/UL (ref 0.8–3)
LYMPHOCYTES NFR BLD AUTO: 7.9 %
MAGNESIUM SERPL-MCNC: 2.66 MG/DL (ref 1.6–2.4)
MCH RBC QN AUTO: 31.6 PG (ref 26–34)
MCHC RBC AUTO-ENTMCNC: 31.3 G/DL (ref 32–36)
MCV RBC AUTO: 101 FL (ref 80–100)
MONOCYTES # BLD AUTO: 1.01 X10*3/UL (ref 0.05–0.8)
MONOCYTES NFR BLD AUTO: 8.1 %
MYCOBACTERIUM SPEC CULT: NORMAL
NEUTROPHILS # BLD AUTO: 10.28 X10*3/UL (ref 1.6–5.5)
NEUTROPHILS NFR BLD AUTO: 82.6 %
NRBC BLD-RTO: 0 /100 WBCS (ref 0–0)
OXYHGB MFR BLDV: 88.1 % (ref 45–75)
PCO2 BLDV: 33 MM HG (ref 41–51)
PH BLDV: 7.32 PH (ref 7.33–7.43)
PHOSPHATE SERPL-MCNC: 3.4 MG/DL (ref 2.5–4.9)
PLATELET # BLD AUTO: 288 X10*3/UL (ref 150–450)
PO2 BLDV: 58 MM HG (ref 35–45)
POTASSIUM BLDV-SCNC: 4.1 MMOL/L (ref 3.5–5.3)
POTASSIUM SERPL-SCNC: 4 MMOL/L (ref 3.5–5.3)
RBC # BLD AUTO: 3.01 X10*6/UL (ref 4–5.2)
SAO2 % BLDV: 90 % (ref 45–75)
SODIUM BLDV-SCNC: 141 MMOL/L (ref 136–145)
SODIUM SERPL-SCNC: 145 MMOL/L (ref 136–145)
WBC # BLD AUTO: 12.5 X10*3/UL (ref 4.4–11.3)

## 2024-08-07 PROCEDURE — 2500000004 HC RX 250 GENERAL PHARMACY W/ HCPCS (ALT 636 FOR OP/ED)

## 2024-08-07 PROCEDURE — 99221 1ST HOSP IP/OBS SF/LOW 40: CPT | Performed by: INTERNAL MEDICINE

## 2024-08-07 PROCEDURE — 2500000001 HC RX 250 WO HCPCS SELF ADMINISTERED DRUGS (ALT 637 FOR MEDICARE OP)

## 2024-08-07 PROCEDURE — 80069 RENAL FUNCTION PANEL: CPT

## 2024-08-07 PROCEDURE — 2500000004 HC RX 250 GENERAL PHARMACY W/ HCPCS (ALT 636 FOR OP/ED): Mod: JZ

## 2024-08-07 PROCEDURE — 84132 ASSAY OF SERUM POTASSIUM: CPT

## 2024-08-07 PROCEDURE — 83735 ASSAY OF MAGNESIUM: CPT

## 2024-08-07 PROCEDURE — 85025 COMPLETE CBC W/AUTO DIFF WBC: CPT

## 2024-08-07 PROCEDURE — 99233 SBSQ HOSP IP/OBS HIGH 50: CPT | Performed by: STUDENT IN AN ORGANIZED HEALTH CARE EDUCATION/TRAINING PROGRAM

## 2024-08-07 RX ORDER — LIDOCAINE HYDROCHLORIDE 10 MG/ML
5 INJECTION, SOLUTION EPIDURAL; INFILTRATION; INTRACAUDAL; PERINEURAL ONCE
Status: CANCELLED | OUTPATIENT
Start: 2024-08-07 | End: 2024-08-07

## 2024-08-07 RX ORDER — TORSEMIDE 20 MG/1
20 TABLET ORAL
Start: 2024-08-07

## 2024-08-07 RX ADMIN — Medication 2000 UNITS: at 06:05

## 2024-08-07 RX ADMIN — LEVOTHYROXINE, LIOTHYRONINE 90 MG: 38; 9 TABLET ORAL at 06:05

## 2024-08-07 RX ADMIN — FAMOTIDINE 20 MG: 20 TABLET ORAL at 09:13

## 2024-08-07 RX ADMIN — LOSARTAN POTASSIUM 50 MG: 25 TABLET, FILM COATED ORAL at 09:13

## 2024-08-07 RX ADMIN — CARVEDILOL 6.25 MG: 12.5 TABLET, FILM COATED ORAL at 09:11

## 2024-08-07 RX ADMIN — SODIUM BICARBONATE 650 MG: 650 TABLET ORAL at 09:29

## 2024-08-07 RX ADMIN — PREDNISONE 30 MG: 5 TABLET ORAL at 09:29

## 2024-08-07 RX ADMIN — CEFAZOLIN SODIUM 1 G: 1 INJECTION, SOLUTION INTRAVENOUS at 09:11

## 2024-08-07 ASSESSMENT — COGNITIVE AND FUNCTIONAL STATUS - GENERAL
TOILETING: A LITTLE
MOVING TO AND FROM BED TO CHAIR: A LITTLE
CLIMB 3 TO 5 STEPS WITH RAILING: A LOT
WALKING IN HOSPITAL ROOM: A LITTLE
MOVING FROM LYING ON BACK TO SITTING ON SIDE OF FLAT BED WITH BEDRAILS: A LITTLE
STANDING UP FROM CHAIR USING ARMS: A LITTLE
MOBILITY SCORE: 17
DAILY ACTIVITIY SCORE: 23
TURNING FROM BACK TO SIDE WHILE IN FLAT BAD: A LITTLE

## 2024-08-07 ASSESSMENT — PAIN SCALES - GENERAL: PAINLEVEL_OUTOF10: 2

## 2024-08-07 NOTE — DISCHARGE SUMMARY
Discharge Diagnosis  Surgical wound infection    Issues Requiring Follow-Up  Right cheek surgical site infection   Cephalexin 500mg TID through end of day 8/15  Outpatient follow-up with dermatology  Left great toe gout flare  Complete course of prednisone 20mg 8/8 and 10mg 8/9  Non-anion metabolic gap acidosis w/ low bicarb, anemia, Stage IIIb CKD  Outpatient follow-up with nephrology to be coordinated with PCP  Outpatient RFP and CBC to be followed by PCP and/or nephrology  Bilateral lower extremity edema  Torsemide was held in the acute setting 2/2 kidney injury  Follow-up with PCP on kidney function labs and edema to determine appropriate dosing regimen    Discharge Meds     Your medication list        START taking these medications        Instructions Last Dose Given Next Dose Due   cephalexin 500 mg capsule  Commonly known as: Keflex  Notes to patient: antibiotic      Take 1 capsule (500 mg) by mouth 3 times a day for 8 days. Do not fill before August 7, 2024.       famotidine 20 mg tablet  Commonly known as: Pepcid      Take 1 tablet (20 mg) by mouth once daily. Do not fill before August 7, 2024.       polyethylene glycol 17 gram/dose powder  Commonly known as: Glycolax, Miralax  Notes to patient: To induce bowel movements       Take 17 g by mouth once daily.       predniSONE 10 mg tablet  Commonly known as: Deltasone  Start taking on: August 8, 2024  Notes to patient: Steroid       Take 2 tablets (20 mg) by mouth once daily for 1 day, THEN 1 tablet (10 mg) once daily for 1 day. Do not fill before August 8, 2024.       sodium bicarbonate 650 mg tablet      Take 1 tablet (650 mg) by mouth 2 times a day.              CHANGE how you take these medications        Instructions Last Dose Given Next Dose Due   mirtazapine 15 mg tablet  Commonly known as: Remeron  What changed:   medication strength  how much to take      Take 1 tablet (15 mg) by mouth once daily at bedtime.       torsemide 20 mg tablet  Commonly  known as: Demadex  What changed:   when to take this  reasons to take this      Take 1 tablet (20 mg) by mouth every other day if needed (For increased leg swelling).              CONTINUE taking these medications        Instructions Last Dose Given Next Dose Due   Jesenia Thyroid 90 mg tablet  Generic drug: thyroid (pork)           Bariatric Multivitamins 45 mg iron- 800 mcg-120 mcg capsule  Generic drug: multivitamin-min-iron-FA-vit K           carvedilol 6.25 mg tablet  Commonly known as: Coreg           cholecalciferol 50 mcg (2,000 unit) capsule  Commonly known as: Vitamin D-3           losartan 50 mg tablet  Commonly known as: Cozaar           Prolia 60 mg/mL syringe  Generic drug: denosumab                  STOP taking these medications      doxycycline 100 mg tablet  Commonly known as: Adoxa        ibuprofen 200 mg tablet        mupirocin 2 % ointment  Commonly known as: Bactroban        traMADoL-acetaminophen 37.5-325 mg tablet  Commonly known as: Ultracet                  Where to Get Your Medications        These medications were sent to [facility] [place] [address]      Phone: [phone]   mirtazapine 15 mg tablet       These medications were sent to [facility] [address]  [address] [address]      Phone: [phone]   famotidine 20 mg tablet  polyethylene glycol 17 gram/dose powder       These medications were sent to [facility]  [id number] [place] [address], [address] [id number]      Hours: 8AM to 6PM Mon-Fri, 8AM to 4PM Sat, 9AM to 1PM Sun Phone: [phone]   cephalexin 500 mg capsule  predniSONE 10 mg tablet  sodium bicarbonate 650 mg tablet       Information about where to get these medications is not yet available    Ask your nurse or doctor about these medications  torsemide 20 mg tablet         Test Results Pending At Discharge  Pending  "Labs       Order Current Status    Type And Screen Collected (08/03/24 5035)            Hospital Course  Xin Fajardo \"Prisca\" is a 83 y.o. female with PMH of ongoing extensive workup for 7 months of \"failure to thrive,\" remote colon + lung cancer, complex abdominal surgical hx, hemorrhoids, papillary thyroid cancer s/p thyroidectomy, chronic diastolic heart failure, mitral regurgitation (moderate per TTE 2022, HTN, HLD, stage IIIb CKD, osteoporosis, macular degeneration,  presenting to the hospital s/p right cheek Mohs surgery (7/23) with surgical site infection and severe constipation.     Patient was admitted directly to the inpatient floor from her dermatology clinic on 8/2. Upon admission, she was started on vanc/zosyn, which was later narrowed to vanc/cefazolin and then further narrowed to cefazolin after wound cultures resulted. She was discharged on a two week course of Keflex since the start time of IV medications (8/2-8/16).    Initial lab work revealed a non-anion gap metabolic acidosis (bicarb: 8 drop from prior 6/24: 11) and anemia (Hgb: 8.2 drop from prior 6/7: 10.1). Lactate was normal at 0.9 reducing concern for lactic acidosis. Additionally, pt was afebrile, vitals were within normal limits, and pt A&Ox4 with no dyspnea or SOB. On 8/3, due to difficulty getting and maintaining peripheral access, midline access was obtained and patient was started on sodium bicarb 150mEq at 100mL/hr. Additionally, anemia workup revealed a picture predominantly consistent with anemia of chronic disease (transferrin: 152 (low); TIBC: 216 (low); iron: 42 (normal: ); ferritin: 292 (high); retic absolute: 0.065; retic %: 2.4; retic index 1.02 (hypoproliferation <2)). On 8/5, pt's Hgb was noted to be 7.2 and she was given a transfusion of one unit of blood after which her Hgb recovered to 9.3. Additionally, her bicarb was noted to begin to trend down after the bicarb drip was stopped (23->18) and so she was " started on PO bicarb.    Patient was initially started on senna and Miralax for constipation. On 8/3, when condition failed to improve, this was escalated to dulcolax suppository, which resolved the constipation. Patient continued to endorse stomach pain and early satiety. She was started on Mirtazapine 15mg nightly (higher than prior home dose), Famotidine 20mg daily, and Mylanta. This helped ameliorate her stomach pain.     On the evening of 8/5 into the morning of 8/6, patient began to experience left great toe swelling, erythema, and pain consistent with past episodes of gout flares. The pt was started on a prednisone taper (40mg on 8/6, 30mg on 8/7, 20mg on 8/8, 10mg on 8/9), which resolved symptoms.    To do:  Right cheek surgical site infection   Cephalexin 500mg TID through end of day 8/15  Outpatient follow-up with dermatology  Left great toe gout flare  Complete course of prednisone 20mg 8/8 and 10mg 8/9  Non-anion metabolic gap acidosis w/ low bicarb, anemia, Stage IIIb CKD  Outpatient follow-up with nephrology to be coordinated with PCP  Outpatient RFP and CBC to be followed by PCP and/or nephrology  Bilateral lower extremity edema  Torsemide was held in the acute setting 2/2 kidney injury  Follow-up with PCP on kidney function labs and edema to determine appropriate dosing regimen    Pertinent Physical Exam At Time of Discharge  Physical Exam  Vitals and nursing note reviewed.   Constitutional:       General: She is not in acute distress.  HENT:      Head: Normocephalic.      Comments: Right cheek wound appreciated on exam; area mildly tender to palpation; endorses numbness to touch surrounding wound and right perioral area.      Nose: Nose normal. No congestion or rhinorrhea.      Mouth/Throat:      Mouth: Mucous membranes are moist.      Pharynx: No oropharyngeal exudate or posterior oropharyngeal erythema.   Eyes:      Extraocular Movements: Extraocular movements intact.      Conjunctiva/sclera:  Conjunctivae normal.   Cardiovascular:      Rate and Rhythm: Normal rate and regular rhythm.      Pulses: Normal pulses.      Heart sounds: Normal heart sounds.   Pulmonary:      Effort: Pulmonary effort is normal. No respiratory distress.      Breath sounds: Normal breath sounds.   Abdominal:      General: Abdomen is flat. There is no distension.      Palpations: Abdomen is soft. There is no mass.      Tenderness: There is no abdominal tenderness. There is no guarding.      Hernia: No hernia is present.   Musculoskeletal:         General: Normal range of motion.      Cervical back: Normal range of motion. No rigidity.   Skin:     General: Skin is warm and dry.   Neurological:      Mental Status: She is alert and oriented to person, place, and time.      Comments: Right perioral weakness noted with reduced smile. Remainder CN 2-12 intact to exam.   Psychiatric:         Mood and Affect: Mood normal.         Behavior: Behavior normal.         Outpatient Follow-Up  Future Appointments   Date Time Provider Department Center   8/13/2024 11:30 AM Margarito Elizabeth MD [email or web address]   8/20/2024  1:00 PM Zacarias Humphries MD [id number]   8/30/2024  1:00 PM Idris Wright MD [id number]       Patient seen and discussed with attending physician Dr. Young.    Jenny Hauser, MS-4  Internal Medicine  [place] Pager: [phone]

## 2024-08-07 NOTE — CARE PLAN
The patient's goals for the shift include      The clinical goals for the shift include pt will be safe and free from falls by end of shift.      Problem: Skin  Goal: Decreased wound size/increased tissue granulation at next dressing change  Outcome: Progressing  Goal: Participates in plan/prevention/treatment measures  Outcome: Progressing  Goal: Prevent/manage excess moisture  Outcome: Progressing  Goal: Prevent/minimize sheer/friction injuries  Outcome: Progressing  Goal: Promote/optimize nutrition  Outcome: Progressing  Goal: Promote skin healing  Outcome: Progressing     Problem: Infection related to problem list condition  Goal: Infection will resolve through treatment  Outcome: Progressing     Problem: Pain  Goal: Takes deep breaths with improved pain control throughout the shift  Outcome: Progressing  Goal: Walks with improved pain control throughout the shift  Outcome: Progressing  Goal: Performs ADL's with improved pain control throughout shift  Outcome: Progressing     Problem: Pain - Adult  Goal: Verbalizes/displays adequate comfort level or baseline comfort level  Outcome: Progressing     Problem: Safety - Adult  Goal: Free from fall injury  Outcome: Progressing     Problem: Discharge Planning  Goal: Discharge to home or other facility with appropriate resources  Outcome: Progressing

## 2024-08-07 NOTE — PROGRESS NOTES
DERMATOLOGY CONSULT PROGRESS NOTE  Name: Xin Fajardo  MRN: [id number]  : 1940    Subjective   - Patient's right cheek pain around her wound infection improving   - Patient concerned regarding asymmetric smile   - Patient's right foot pain improving after starting treatment for gout, now able to walk around      Objective    Vitals:    24 0300 24 0900 24 0911 24 1323   BP: 110/58 147/67  142/60   BP Location: Left arm      Patient Position: Lying      Pulse: 75  86 70   Resp: 16   18   Temp: 36.1 °C (97 °F)   36.4 °C (97.5 °F)   TempSrc: Temporal   Temporal   SpO2: 97%   99%   Weight:       Height:          Exam    GEN: no acute distress  NEURO: moving all extremities   EYES: conjunctiva and eyelids normal. No conjunctival injection or erosions appreciated. Able to keep both eyelids closed again resistance   ENT:   - Lips: normal  NECK: normal and symmetric.   CV: no varicosities, warmth or tenderness of extremities  EXTREMITIES: no distal digital clubbing, cyanosis, petechiae   SKIN: A full body skin exam including scalp, face, eyes, ears, neck, trunk, bilateral upper & lower extremities, toenails and fingernails were examined with the following findings:  - Involving the right malar and mandibular cheek there is dehiscence of the suture line with open ulceration. There appears to be granulation tissue at the base with no purulent fluid. Surrounding the suture line there is a patch of erythema, with borders decreased in size as compared to prior exam and markings.     NEURO: Diminished function of right upper lip elevation. Intact function of orbicularis oculi.     Medications:  Scheduled Meds: carvedilol, 6.25 mg, oral, BID  ceFAZolin, 1 g, intravenous, q12h  cholecalciferol, 2,000 Units, oral, Daily  famotidine, 20 mg, oral, Daily  heparin (porcine), 5,000 Units, subcutaneous, q8h  lidocaine, 5 mL, infiltration, Once  lidocaine, 5 mL, infiltration, Once  losartan, 50 mg, oral,  Daily  mirtazapine, 15 mg, oral, Nightly  polyethylene glycol, 17 g, oral, Daily  [START ON 8/8/2024] predniSONE, 20 mg, oral, Daily   Followed by  [START ON 8/9/2024] predniSONE, 10 mg, oral, Daily  sodium bicarbonate, 650 mg, oral, BID  thyroid (pork), 90 mg, oral, Daily  [Held by provider] torsemide, 20 mg, oral, Daily       Continuous Infusions:     PRN Meds: PRN medications: acetaminophen **OR** [DISCONTINUED] acetaminophen **OR** [DISCONTINUED] acetaminophen, alum-mag hydroxide-simeth, lidocaine, melatonin, ondansetron **OR** ondansetron, phenyleph-min oil-petrolatum, witch hazel     Results:  Results for orders placed or performed during the hospital encounter of 08/02/24 (from the past 24 hour(s))   CBC and Auto Differential   Result Value Ref Range    WBC 12.5 (H) 4.4 - 11.3 x10*3/uL    nRBC 0.0 0.0 - 0.0 /100 WBCs    RBC 3.01 (L) 4.00 - 5.20 x10*6/uL    Hemoglobin 9.5 (L) 12.0 - 16.0 g/dL    Hematocrit 30.4 (L) 36.0 - 46.0 %     (H) 80 - 100 fL    MCH 31.6 26.0 - 34.0 pg    MCHC 31.3 (L) 32.0 - 36.0 g/dL    RDW 15.5 (H) 11.5 - 14.5 %    Platelets 288 150 - 450 x10*3/uL    Neutrophils % 82.6 40.0 - 80.0 %    Immature Granulocytes %, Automated 1.1 (H) 0.0 - 0.9 %    Lymphocytes % 7.9 13.0 - 44.0 %    Monocytes % 8.1 2.0 - 10.0 %    Eosinophils % 0.1 0.0 - 6.0 %    Basophils % 0.2 0.0 - 2.0 %    Neutrophils Absolute 10.28 (H) 1.60 - 5.50 x10*3/uL    Immature Granulocytes Absolute, Automated 0.14 0.00 - 0.50 x10*3/uL    Lymphocytes Absolute 0.99 0.80 - 3.00 x10*3/uL    Monocytes Absolute 1.01 (H) 0.05 - 0.80 x10*3/uL    Eosinophils Absolute 0.01 0.00 - 0.40 x10*3/uL    Basophils Absolute 0.03 0.00 - 0.10 x10*3/uL   Magnesium   Result Value Ref Range    Magnesium 2.66 (H) 1.60 - 2.40 mg/dL   Renal Function Panel   Result Value Ref Range    Glucose 103 (H) 74 - 99 mg/dL    Sodium 145 136 - 145 mmol/L    Potassium 4.0 3.5 - 5.3 mmol/L    Chloride 115 (H) 98 - 107 mmol/L    Bicarbonate 22 21 - 32 mmol/L     Anion Gap 12 10 - 20 mmol/L    Urea Nitrogen 44 (H) 6 - 23 mg/dL    Creatinine 1.80 (H) 0.50 - 1.05 mg/dL    eGFR 27 (L) >60 mL/min/1.73m*2    Calcium 8.2 (L) 8.6 - 10.6 mg/dL    Phosphorus 3.4 2.5 - 4.9 mg/dL    Albumin 2.8 (L) 3.4 - 5.0 g/dL   Blood Gas Venous Full Panel   Result Value Ref Range    POCT pH, Venous 7.32 (L) 7.33 - 7.43 pH    POCT pCO2, Venous 33 (L) 41 - 51 mm Hg    POCT pO2, Venous 58 (H) 35 - 45 mm Hg    POCT SO2, Venous 90 (H) 45 - 75 %    POCT Oxy Hemoglobin, Venous 88.1 (H) 45.0 - 75.0 %    POCT Hematocrit Calculated, Venous 25.0 (L) 36.0 - 46.0 %    POCT Sodium, Venous 141 136 - 145 mmol/L    POCT Potassium, Venous 4.1 3.5 - 5.3 mmol/L    POCT Chloride, Venous 114 (H) 98 - 107 mmol/L    POCT Ionized Calicum, Venous 1.21 1.10 - 1.33 mmol/L    POCT Glucose, Venous 116 (H) 74 - 99 mg/dL    POCT Lactate, Venous 2.2 (H) 0.4 - 2.0 mmol/L    POCT Base Excess, Venous -8.3 (L) -2.0 - 3.0 mmol/L    POCT HCO3 Calculated, Venous 17.0 (L) 22.0 - 26.0 mmol/L    POCT Hemoglobin, Venous 8.3 (L) 12.0 - 16.0 g/dL    POCT Anion Gap, Venous 14.0 10.0 - 25.0 mmol/L    Patient Temperature 37.0 degrees Celsius    FiO2 21 %        Assessment/Plan   Xin Fajardo is a 83 y.o. female with a past medical history of BCC s/p Mohs procedure (7/23) on day 5 of admission presenting with a surgical wound infection the right cheek and was admitted for infection and constipation management. Dermatology was consulted for f/u of wound infection.    Differential diagnoses:   - Surgical site infection  - Facial asymmetry secondary to diminished function of facial nerve in setting of inflammation/edema vs less likely facial nerve injury    Impression:  Based on examination today in comparison to prior days, the infection continues to improve and there is ongoing wound healing. There is decreased erythema around the surgical site, and no purulent drainage was observed. She continues to respond well on her current antibiotic  regimen.     Patient was also concerned about facial asymmetry, specifically decreased right sided movement when she smiles. She is able to keep both eyelids closed against resistance. After review of photos following Mohs procedure, the final defect only reach the subcutaneous fat. Therefore the surgery did not extend to a depth that would increase risk for facial nerve injury. In this anatomic location, the facial nerve lies deep to the SMAS which was not breached based on photograph of final Mohs defect.  At this time, we favor that the asymmetry could be related to decreased nerve function in setting of inflammation and edema near the surgical site. If this is the case, we would expect improvement in motor function over the next few months as the wound continues to heal and edema resolves. Patient will continue to have observation while outpatient for improvement of motor function. Finally, discussions regarding possible revision and scar management can only be considered once the entire wound infection has cleared and wound has closed.      Recommendations:  - Continue to apply generous amount of Vaseline and bandage to the right cheek wound in order to assist with healing and decrease risk of infection  - Continue with antibiotics per primary team   - Will arrange for outpatient dermatology follow up    The patient was seen and discussed with attending physician Dr. Mixon. The assessment and plan was communicated to the care team.    Thank you for the consultation and for the opportunity to contribute to the care of this patient.    Meliton Langley, MS4     Umer Mckinnon MD  PGY3, Dermatology  Epic chat (preferred)  Team pager [phone]     I, or a resident under my supervision, was present with the medical student who participated in the documentation of this note.  I have personally seen and examined the patient and performed the medical decision-making components. I have reviewed the medical student documentation  and/or resident documentation and verified the findings in the note as written with additions or exceptions as stated in the body of the note.    Davis Mixon MD

## 2024-08-07 NOTE — CONSULTS
NEPHROLOGY NEW CONSULT NOTE   Xin Fajardo   83 y.o.    @WT@  N/Room: [id number]/5514-A    Reason for consult: Acidemia    HPI:  Xin Fajardo is a 83 y.o. female with a PMHx of CKD IIIb, Distant malignancies, HFpEF, MR, HTN, Hypothyroid. She underwent a Mohs procedure/surgery of the right cheek on  then was readmitted on 8/3 for infection of the wound. She has had acidemia for many days now and has since been started on oral bicarbonate for that. Oral bicarbonate caused significant improvement. We were consulted for recommendations before discharge of the patient.     Past Medical History:   Diagnosis Date    Essential (primary) hypertension     HTN (hypertension), benign    Personal history of other diseases of the circulatory system     History of congestive heart failure    Personal history of other diseases of the circulatory system     History of coronary artery disease    Personal history of other endocrine, nutritional and metabolic disease     History of hypothyroidism    Personal history of other malignant neoplasm of bronchus and lung     History of malignant neoplasm of lung    Personal history of other malignant neoplasm of large intestine     History of malignant neoplasm of colon      Past Surgical History:   Procedure Laterality Date    OTHER SURGICAL HISTORY  2021    Thyroid surgery    OTHER SURGICAL HISTORY  2021    Colon surgery    OTHER SURGICAL HISTORY  2021    Lung lobectomy    OTHER SURGICAL HISTORY  2021    Hysterectomy    US GUIDED ABSCESS DRAIN  2013    US GUIDED ABSCESS DRAIN 2013 [facility] [place] [facility]    US GUIDED PERCUTANEOUS PERITONEAL OR RETROPERITONEAL FLUID COLLECTION DRAINAGE  2013    US GUIDED PERCUTANEOUS PERITONEAL OR RETROPERITONEAL FLUID COLLECTION DRAINAGE 2013 [facility] [place] [facility]      Family History   Problem Relation Name Age of Onset    Heart disease Mother      Heart attack Father  56         from MI     Coronary artery disease Father      Breast cancer Sister             Allergies   Allergen Reactions    Amlodipine Swelling        Facility-Administered Medications Prior to Admission   Medication Dose Route Frequency Provider Last Rate Last Admin    perflutren lipid microspheres (Definity) injection 2 mL of dilution  2 mL of dilution intravenous Once in imaging Idris Wright MD         Medications Prior to Admission   Medication Sig Dispense Refill Last Dose    losartan (Cozaar) 50 mg tablet Take 1 tablet (50 mg) by mouth once daily.   8/2/2024    torsemide (Demadex) 20 mg tablet Take 1 tablet (20 mg) by mouth once daily.   8/2/2024    carvedilol (Coreg) 6.25 mg tablet Take by mouth every 12 hours.       cholecalciferol (Vitamin D-3) 50 mcg (2,000 unit) capsule Take by mouth.       denosumab (Prolia) 60 mg/mL syringe Inject 1 mL (60 mg) under the skin every 6 months.       doxycycline (Adoxa) 100 mg tablet Take 1 pill twice daily.  Take with a meals and swallow with full glass of water.  Do not lie down for at least 30 minutes after 14 tablet 0     ibuprofen 200 mg tablet Take 2 tablets (400 mg) by mouth 2 times a day.       mirtazapine (Remeron) 7.5 mg tablet Take 1 tablet (7.5 mg) by mouth once daily at bedtime. 30 tablet 0     multivitamin-min-iron-FA-vit K (Bariatric Multivitamins) 45 mg iron- 800 mcg-120 mcg capsule Take by mouth.       mupirocin (Bactroban) 2 % ointment Apply to surface of wound during dressing changes 30 g 1     thyroid, pork, ([place] Thyroid) 90 mg tablet Take 1 tablet (90 mg) by mouth once daily in the morning. Take before meals.       traMADoL-acetaminophen (Ultracet) 37.5-325 mg tablet Take 1 tablet by mouth every 8 hours if needed for severe pain (7 - 10) for up to 21 doses. Take with acetaminophen 500 mg 21 tablet 0         Meds:   carvedilol, 6.25 mg, BID  ceFAZolin, 1 g, q12h  cholecalciferol, 2,000 Units, Daily  famotidine, 20 mg, Daily  heparin (porcine), 5,000 Units,  q8h  lidocaine, 5 mL, Once  lidocaine, 5 mL, Once  losartan, 50 mg, Daily  mirtazapine, 15 mg, Nightly  polyethylene glycol, 17 g, Daily  [START ON 8/8/2024] predniSONE, 20 mg, Daily   Followed by  [START ON 8/9/2024] predniSONE, 10 mg, Daily  sodium bicarbonate, 650 mg, BID  thyroid (pork), 90 mg, Daily  [Held by provider] torsemide, 20 mg, Daily         acetaminophen, 975 mg, q8h PRN  alum-mag hydroxide-simeth, 10 mL, 4x daily PRN  lidocaine, 1 patch, Daily PRN  melatonin, 5 mg, Nightly PRN  ondansetron, 4 mg, q8h PRN   Or  ondansetron, 4 mg, q8h PRN  phenyleph-min oil-petrolatum, , 4x daily PRN  witch hazel, 1 each, 4x daily PRN        Vitals:    08/07/24 1323   BP: 142/60   Pulse: 70   Resp: 18   Temp: 36.4 °C (97.5 °F)   SpO2: 99%        08/05 1900 - 08/07 0659  In: 1390 [P.O.:890; I.V.:50]  Out: -    Weight change:      General appearance: AAOx3. No distress  Eyes: non-icteric  Skin: no apparent rash  Heart: normal s1s2 regular  Lungs: CTA bilat.  no wheezing/crackles  Extremities: +1-2 edema bilat      ASSESSMENT:  Xin Fajardo is a 83 y.o. female with a PMHx of CKD IIIb, Distant malignancies, HFpEF, MR, HTN, Hypothyroid. She underwent a Mohs procedure/surgery of the right cheek on 7/23 then was readmitted on 8/3 for infection of the wound. She has had acidemia for many days now and has since been started on oral bicarbonate for that. Oral bicarbonate caused significant improvement. We were consulted for recommendations before discharge of the patient.     Anion gap is not elevated so (NAGMA). Patient has CKD IIIb with likely chronic acidemia. The most likely cause would be her CKD.      Recommendations:  - No indication for dialysis  - Keep on Oral Bicarbonate tablets  - Nephrology follow up (Please book an appointment with a  nephrologist next available)    Michi Miramontes MD  Nephrology Fellow  24 hour Renal Pager - [phone]    Discussed with attending nephrologist     I saw and evaluated the  patient. I personally obtained the key and critical portions of the history and physical exam or was physically present for key and critical portions performed by the resident/fellow. I reviewed the resident/fellow's documentation and discussed the patient with the resident/fellow. I agree with the resident/fellow's medical decision making as documented in the note.     Linh De Oliveira MD

## 2024-08-08 LAB
ATRIAL RATE: 77 BPM
P AXIS: 66 DEGREES
P OFFSET: 191 MS
P ONSET: 140 MS
PR INTERVAL: 156 MS
Q ONSET: 218 MS
QRS COUNT: 13 BEATS
QRS DURATION: 92 MS
QT INTERVAL: 388 MS
QTC CALCULATION(BAZETT): 439 MS
QTC FREDERICIA: 421 MS
R AXIS: -1 DEGREES
T AXIS: 42 DEGREES
T OFFSET: 412 MS
VENTRICULAR RATE: 77 BPM

## 2024-08-09 ENCOUNTER — PATIENT OUTREACH (OUTPATIENT)
Dept: CARE COORDINATION | Facility: CLINIC | Age: 84
End: 2024-08-09
Payer: MEDICARE

## 2024-08-09 ENCOUNTER — HOME CARE VISIT (OUTPATIENT)
Dept: HOME HEALTH SERVICES | Facility: HOME HEALTH | Age: 84
End: 2024-08-09
Payer: MEDICARE

## 2024-08-09 VITALS
SYSTOLIC BLOOD PRESSURE: 110 MMHG | DIASTOLIC BLOOD PRESSURE: 60 MMHG | RESPIRATION RATE: 18 BRPM | TEMPERATURE: 97.6 F | OXYGEN SATURATION: 99 % | HEART RATE: 68 BPM

## 2024-08-09 VITALS — RESPIRATION RATE: 20 BRPM

## 2024-08-09 PROCEDURE — G0299 HHS/HOSPICE OF RN EA 15 MIN: HCPCS | Mod: HHH

## 2024-08-09 PROCEDURE — 169592 NO-PAY CLAIM PROCEDURE

## 2024-08-09 PROCEDURE — G0151 HHCP-SERV OF PT,EA 15 MIN: HCPCS | Mod: HHH

## 2024-08-09 SDOH — HEALTH STABILITY: PHYSICAL HEALTH: PHYSICAL EXERCISE: SITTING

## 2024-08-09 SDOH — HEALTH STABILITY: PHYSICAL HEALTH: EXERCISE ACTIVITIES SETS: 1

## 2024-08-09 SDOH — HEALTH STABILITY: PHYSICAL HEALTH: RESISTANCE: LEVEL 2 THERABAND

## 2024-08-09 SDOH — HEALTH STABILITY: PHYSICAL HEALTH

## 2024-08-09 SDOH — HEALTH STABILITY: PHYSICAL HEALTH: EXERCISE ACTIVITY: ANKLE PUMPS, HIP FLEX/ABD/ADD, LAQ

## 2024-08-09 SDOH — HEALTH STABILITY: PHYSICAL HEALTH: EXERCISE ACTIVITY: HIP ABD

## 2024-08-09 SDOH — HEALTH STABILITY: PHYSICAL HEALTH: PHYSICAL EXERCISE: 10

## 2024-08-09 SDOH — HEALTH STABILITY: PHYSICAL HEALTH: EXERCISE TYPE: WHEP

## 2024-08-09 ASSESSMENT — GAIT ASSESSMENTS
STEP CONTINUITY: 1 - STEPS APPEAR CONTINUOUS
WALKING STANCE: 0 - HEELS APART
BALANCE AND GAIT SCORE: 19
TRUNK SCORE: 1
GAIT SCORE: 8
INITIATION OF GAIT IMMEDIATELY AFTER GO: 1 - NO HESITANCY
PATH SCORE: 2
STEP SYMMETRY: 0 - RIGHT AND LEFT STEP LENGTH NOT EQUAL
PATH: 2 - STRAIGHT WITHOUT WALKING AID
TRUNK: 1 - NO SWAY BUT FLEXION OF KNEES OR BACK OR SPREADS ARMS WHILE WALKING

## 2024-08-09 ASSESSMENT — BALANCE ASSESSMENTS
ARISING SCORE: 1
SITTING BALANCE: 1 - STEADY, SAFE
ATTEMPTS TO ARISE: 2 - ABLE TO RISE, ONE ATTEMPT
NUDGED SCORE: 1
NUDGED: 1 - STAGGERS, GRABS, CATCHES SELF
SITTING DOWN: 1 - USES ARMS OR NOT SMOOTH MOTION
TURNING 360 DEGREES STEPS: 1 - CONTINUOUS STEPS
EYES CLOSED AT MAXIMUM POSITION NUDGED: 0 - UNSTEADY
IMMEDIATE STANDING BALANCE FIRST 5 SECONDS: 2 - STEADY WITHOUT WALKER OR OTHER SUPPORT
ARISES: 1 - ABLE, USES ARMS TO HELP
BALANCE SCORE: 11
STANDING BALANCE: 1 - STEADY BUT WIDE STANCE AND USES CANE OR OTHER SUPPORT

## 2024-08-09 ASSESSMENT — ENCOUNTER SYMPTOMS
LOWER EXTREMITY EDEMA: 1
HIGHEST PAIN SEVERITY IN PAST 24 HOURS: 5/10
PAIN: 1
SUBJECTIVE PAIN PROGRESSION: UNCHANGED
PAIN SEVERITY GOAL: 0/10
OCCASIONAL FEELINGS OF UNSTEADINESS: 1
APPETITE LEVEL: FAIR
LOWEST PAIN SEVERITY IN PAST 24 HOURS: 0/10
PAIN SEVERITY GOAL: 0/10
PERSON REPORTING PAIN: PATIENT
PAIN LOCATION: BACK
LOWEST PAIN SEVERITY IN PAST 24 HOURS: 0/10
HIGHEST PAIN SEVERITY IN PAST 24 HOURS: 5/10

## 2024-08-09 ASSESSMENT — ACTIVITIES OF DAILY LIVING (ADL)
AMBULATION ASSISTANCE ON FLAT SURFACES: 1
OASIS_M1830: 02
AMBULATION_DISTANCE/DURATION_TOLERATED: 30 FEET X2
ENTERING_EXITING_HOME: NEEDS ASSISTANCE

## 2024-08-09 ASSESSMENT — PAIN SCALES - PAIN ASSESSMENT IN ADVANCED DEMENTIA (PAINAD): BREATHING: 0

## 2024-08-09 NOTE — PROGRESS NOTES
Discharge Facility:[facility] [place]   Discharge Diagnosis:  Cellulitis-Facial  Metabolic acidosis  Symptomatic anemia  Failure to thrive in adult  Gastroesophageal reflux disease, unspecified whether esophagitis present    Admission Date:8/2/2024  Discharge Date: 8/7/2024    PCP Appointment Date:8/14/2024  Specialist Appointment Date:   8/20/2024 Cindy  8/30/2024 Cardio/Adriana  Nephro TBD   Hospital Encounter and Summary Linked: Yes/No  See discharge assessment below for further details  Engagement  Call Start Time: 1105 (8/9/2024 11:16 AM)    Medications  Medications reviewed with patient/caregiver?: Yes (8/9/2024 11:16 AM)  Is the patient having any side effects they believe may be caused by any medication additions or changes?: No (8/9/2024 11:16 AM)  Does the patient have all medications ordered at discharge?: Yes (8/9/2024 11:16 AM)  Care Management Interventions: No intervention needed (8/9/2024 11:16 AM)  Is the patient taking all medications as directed (includes completed medication regime)?: Yes (8/9/2024 11:16 AM)  Care Management Interventions: Provided patient education (Prisca discussed is having loose bm, she was on laxatives while inpatient and she has dc'd at home. We discussed to try yogurt or probiotics to see if will help. She will report any worsening or continuation to provider.) (8/9/2024 11:16 AM)  Medication Comments: -- (Cephalexin 500 mg one tid x 8 days, Pepcid 20 mg one qd, Remeron 15 mg one q hs, Prednisone 10 mg 2 tabs x 1 day, 1 tab x 1 day, Sodium Bicarbonate 650 mg tab bid, Torsemide 20 mg on qod) (8/9/2024 11:16 AM)    Appointments  Does the patient have a primary care provider?: Yes (8/9/2024 11:16 AM)  Care Management Interventions: Verified appointment date/time/provider (8/14/2024) (8/9/2024 11:16 AM)  Has the patient kept scheduled appointments due by today?: Yes (8/9/2024 11:16 AM)    Self Management  What is the home health agency?: -- ([facility],  Nurse coming today to change  dressing) (8/9/2024 11:16 AM)  Has home health visited the patient within 72 hours of discharge?: Yes (8/9/2024 11:16 AM)  What Durable Medical Equipment (DME) was ordered?: -- (N/A) (8/9/2024 11:16 AM)    Patient Teaching  Does the patient have access to their discharge instructions?: Yes (8/9/2024 11:16 AM)  What is the patient's perception of their health status since discharge?: Improving (8/9/2024 11:16 AM)  Is the patient/caregiver able to teach back the hierarchy of who to call/visit for symptoms/problems? PCP, Specialist, Home Health nurse, Urgent Care, ED, 911: Yes (8/9/2024 11:16 AM)    Wrap Up  Wrap Up Additional Comments: -- (Prisca states that her face is improving, Nurse will come today to change the dressing. She has fu with Derm scheduled. She will report any concerns to provider.) (8/9/2024 11:16 AM)

## 2024-08-10 ENCOUNTER — LAB (OUTPATIENT)
Dept: LAB | Facility: CLINIC | Age: 84
End: 2024-08-10
Payer: MEDICARE

## 2024-08-10 DIAGNOSIS — L03.90 CELLULITIS: ICD-10-CM

## 2024-08-10 DIAGNOSIS — N18.32 CHRONIC KIDNEY DISEASE (CKD) STAGE G3B/A2, MODERATELY DECREASED GLOMERULAR FILTRATION RATE (GFR) BETWEEN 30-44 ML/MIN/1.73 SQUARE METER AND ALBUMINURIA CREATININE RATIO BETWEEN 30-299 MG/G (C* (MULTI): ICD-10-CM

## 2024-08-10 DIAGNOSIS — Z85.038 HISTORY OF MALIGNANT NEOPLASM OF COLON: ICD-10-CM

## 2024-08-10 DIAGNOSIS — R91.8 LUNG INFILTRATE: ICD-10-CM

## 2024-08-10 DIAGNOSIS — Z01.818 PRE-OP TESTING: ICD-10-CM

## 2024-08-10 LAB
BASOPHILS # BLD AUTO: 0.06 X10*3/UL (ref 0–0.1)
BASOPHILS NFR BLD AUTO: 0.5 %
EOSINOPHIL # BLD AUTO: 0.25 X10*3/UL (ref 0–0.4)
EOSINOPHIL NFR BLD AUTO: 2.2 %
ERYTHROCYTE [DISTWIDTH] IN BLOOD BY AUTOMATED COUNT: 14.6 % (ref 11.5–14.5)
HCT VFR BLD AUTO: 34.9 % (ref 36–46)
HGB BLD-MCNC: 10.2 G/DL (ref 12–16)
IMM GRANULOCYTES # BLD AUTO: 0.2 X10*3/UL (ref 0–0.5)
IMM GRANULOCYTES NFR BLD AUTO: 1.8 % (ref 0–0.9)
LYMPHOCYTES # BLD AUTO: 2.77 X10*3/UL (ref 0.8–3)
LYMPHOCYTES NFR BLD AUTO: 24.3 %
MCH RBC QN AUTO: 31.7 PG (ref 26–34)
MCHC RBC AUTO-ENTMCNC: 29.2 G/DL (ref 32–36)
MCV RBC AUTO: 108 FL (ref 80–100)
MONOCYTES # BLD AUTO: 0.98 X10*3/UL (ref 0.05–0.8)
MONOCYTES NFR BLD AUTO: 8.6 %
NEUTROPHILS # BLD AUTO: 7.15 X10*3/UL (ref 1.6–5.5)
NEUTROPHILS NFR BLD AUTO: 62.6 %
NRBC BLD-RTO: 0 /100 WBCS (ref 0–0)
PLATELET # BLD AUTO: 355 X10*3/UL (ref 150–450)
RBC # BLD AUTO: 3.22 X10*6/UL (ref 4–5.2)
WBC # BLD AUTO: 11.4 X10*3/UL (ref 4.4–11.3)

## 2024-08-10 PROCEDURE — 80048 BASIC METABOLIC PNL TOTAL CA: CPT | Performed by: INTERNAL MEDICINE

## 2024-08-10 PROCEDURE — 85025 COMPLETE CBC W/AUTO DIFF WBC: CPT

## 2024-08-10 PROCEDURE — 36415 COLL VENOUS BLD VENIPUNCTURE: CPT

## 2024-08-10 PROCEDURE — 83735 ASSAY OF MAGNESIUM: CPT | Performed by: INTERNAL MEDICINE

## 2024-08-11 LAB
ANION GAP SERPL CALC-SCNC: 17 MMOL/L (ref 10–20)
BUN SERPL-MCNC: 45 MG/DL (ref 6–23)
CALCIUM SERPL-MCNC: 8.7 MG/DL (ref 8.6–10.6)
CHLORIDE SERPL-SCNC: 115 MMOL/L (ref 98–107)
CO2 SERPL-SCNC: 19 MMOL/L (ref 21–32)
CREAT SERPL-MCNC: 1.7 MG/DL (ref 0.5–1.05)
EGFRCR SERPLBLD CKD-EPI 2021: 29 ML/MIN/1.73M*2
GLUCOSE SERPL-MCNC: 104 MG/DL (ref 74–99)
MAGNESIUM SERPL-MCNC: 2.04 MG/DL (ref 1.6–2.4)
POTASSIUM SERPL-SCNC: 4.2 MMOL/L (ref 3.5–5.3)
SODIUM SERPL-SCNC: 147 MMOL/L (ref 136–145)

## 2024-08-12 ENCOUNTER — HOME CARE VISIT (OUTPATIENT)
Dept: HOME HEALTH SERVICES | Facility: HOME HEALTH | Age: 84
End: 2024-08-12
Payer: MEDICARE

## 2024-08-12 VITALS — DIASTOLIC BLOOD PRESSURE: 60 MMHG | OXYGEN SATURATION: 98 % | HEART RATE: 87 BPM | SYSTOLIC BLOOD PRESSURE: 128 MMHG

## 2024-08-12 PROCEDURE — G0299 HHS/HOSPICE OF RN EA 15 MIN: HCPCS | Mod: HHH

## 2024-08-12 PROCEDURE — G0152 HHCP-SERV OF OT,EA 15 MIN: HCPCS | Mod: HHH

## 2024-08-12 ASSESSMENT — ENCOUNTER SYMPTOMS
LOWER EXTREMITY EDEMA: 1
PAIN SEVERITY GOAL: 0/10
MUSCLE WEAKNESS: 1
PAIN LOCATION: COCCYX
PAIN LOCATION - EXACERBATING FACTORS: SITTING
PERSON REPORTING PAIN: PATIENT
PAIN LOCATION - RELIEVING FACTORS: MOVEMENT/STANDING
LOWEST PAIN SEVERITY IN PAST 24 HOURS: 0/10
HIGHEST PAIN SEVERITY IN PAST 24 HOURS: 0/10
PAIN: 1
LOWEST PAIN SEVERITY IN PAST 24 HOURS: 0/10
HIGHEST PAIN SEVERITY IN PAST 24 HOURS: 7/10
PAIN LOCATION - PAIN SEVERITY: 5/10
APPETITE LEVEL: FAIR
SUBJECTIVE PAIN PROGRESSION: GRADUALLY IMPROVING
PAIN LOCATION - PAIN FREQUENCY: FREQUENT

## 2024-08-12 ASSESSMENT — ACTIVITIES OF DAILY LIVING (ADL)
BATHING ASSESSED: 1
PREPARING MEALS: NEEDS ASSISTANCE
DRESSING_UB_CURRENT_FUNCTION: SUPERVISION
TOILETING: INDEPENDENT
BATHING_CURRENT_FUNCTION: SUPERVISION
DRESSING_LB_CURRENT_FUNCTION: SUPERVISION
TOILETING: 1

## 2024-08-12 ASSESSMENT — PAIN SCALES - PAIN ASSESSMENT IN ADVANCED DEMENTIA (PAINAD): BREATHING: 1

## 2024-08-13 ENCOUNTER — HOME CARE VISIT (OUTPATIENT)
Dept: HOME HEALTH SERVICES | Facility: HOME HEALTH | Age: 84
End: 2024-08-13
Payer: MEDICARE

## 2024-08-13 VITALS — RESPIRATION RATE: 20 BRPM

## 2024-08-13 PROCEDURE — G0151 HHCP-SERV OF PT,EA 15 MIN: HCPCS | Mod: HHH

## 2024-08-13 SDOH — HEALTH STABILITY: PHYSICAL HEALTH: EXERCISE ACTIVITIES SETS: 1

## 2024-08-13 SDOH — HEALTH STABILITY: PHYSICAL HEALTH: PHYSICAL EXERCISE: SUPINE

## 2024-08-13 SDOH — HEALTH STABILITY: PHYSICAL HEALTH: PHYSICAL EXERCISE: 10

## 2024-08-13 SDOH — HEALTH STABILITY: PHYSICAL HEALTH: PHYSICAL EXERCISE: STANDING

## 2024-08-13 SDOH — HEALTH STABILITY: PHYSICAL HEALTH: EXERCISE ACTIVITY: ANKLE PUMPS, QUAD AND GLUT SETS, HEEL SLIDES AND HIP ABD

## 2024-08-13 SDOH — HEALTH STABILITY: PHYSICAL HEALTH: EXERCISE ACTIVITY: STANDING POSTURE

## 2024-08-13 SDOH — HEALTH STABILITY: PHYSICAL HEALTH: EXERCISE TYPE: WHEP

## 2024-08-13 ASSESSMENT — ACTIVITIES OF DAILY LIVING (ADL): AMBULATION ASSISTANCE ON FLAT SURFACES: 1

## 2024-08-13 ASSESSMENT — ENCOUNTER SYMPTOMS
LOWEST PAIN SEVERITY IN PAST 24 HOURS: 0/10
PAIN LOCATION: FACE
PAIN: 1
HIGHEST PAIN SEVERITY IN PAST 24 HOURS: 5/10
PAIN SEVERITY GOAL: 0/10
PERSON REPORTING PAIN: PATIENT

## 2024-08-14 ENCOUNTER — LAB (OUTPATIENT)
Dept: LAB | Facility: LAB | Age: 84
End: 2024-08-14
Payer: MEDICARE

## 2024-08-14 ENCOUNTER — HOME CARE VISIT (OUTPATIENT)
Dept: HOME HEALTH SERVICES | Facility: HOME HEALTH | Age: 84
End: 2024-08-14
Payer: MEDICARE

## 2024-08-14 ENCOUNTER — APPOINTMENT (OUTPATIENT)
Dept: PRIMARY CARE | Facility: CLINIC | Age: 84
End: 2024-08-14
Payer: MEDICARE

## 2024-08-14 VITALS
OXYGEN SATURATION: 98 % | HEART RATE: 74 BPM | DIASTOLIC BLOOD PRESSURE: 64 MMHG | BODY MASS INDEX: 22.55 KG/M2 | WEIGHT: 107.9 LBS | SYSTOLIC BLOOD PRESSURE: 135 MMHG | TEMPERATURE: 98.4 F | RESPIRATION RATE: 14 BRPM

## 2024-08-14 VITALS
DIASTOLIC BLOOD PRESSURE: 70 MMHG | SYSTOLIC BLOOD PRESSURE: 118 MMHG | OXYGEN SATURATION: 99 % | HEART RATE: 68 BPM | RESPIRATION RATE: 18 BRPM | TEMPERATURE: 98.6 F

## 2024-08-14 DIAGNOSIS — L03.211 CELLULITIS, FACE: ICD-10-CM

## 2024-08-14 DIAGNOSIS — D63.8 ANEMIA, CHRONIC DISEASE: Primary | ICD-10-CM

## 2024-08-14 DIAGNOSIS — E87.8 LOW BICARBONATE LEVEL: ICD-10-CM

## 2024-08-14 DIAGNOSIS — D63.8 ANEMIA, CHRONIC DISEASE: ICD-10-CM

## 2024-08-14 DIAGNOSIS — E87.20 METABOLIC ACIDOSIS: ICD-10-CM

## 2024-08-14 DIAGNOSIS — M25.511 CHRONIC RIGHT SHOULDER PAIN: ICD-10-CM

## 2024-08-14 DIAGNOSIS — N18.32 STAGE 3B CHRONIC KIDNEY DISEASE (MULTI): ICD-10-CM

## 2024-08-14 DIAGNOSIS — N18.32 CHRONIC KIDNEY DISEASE (CKD) STAGE G3B/A2, MODERATELY DECREASED GLOMERULAR FILTRATION RATE (GFR) BETWEEN 30-44 ML/MIN/1.73 SQUARE METER AND ALBUMINURIA CREATININE RATIO BETWEEN 30-299 MG/G (C* (MULTI): ICD-10-CM

## 2024-08-14 DIAGNOSIS — G89.29 CHRONIC RIGHT SHOULDER PAIN: ICD-10-CM

## 2024-08-14 DIAGNOSIS — M1A.9XX0 CHRONIC GOUT INVOLVING TOE OF RIGHT FOOT WITHOUT TOPHUS, UNSPECIFIED CAUSE: ICD-10-CM

## 2024-08-14 DIAGNOSIS — L03.90 CELLULITIS: ICD-10-CM

## 2024-08-14 DIAGNOSIS — Z74.09 MOBILITY IMPAIRED: ICD-10-CM

## 2024-08-14 LAB
ACID FAST STN SPEC: NORMAL
MYCOBACTERIUM SPEC CULT: NORMAL

## 2024-08-14 PROCEDURE — 3075F SYST BP GE 130 - 139MM HG: CPT | Performed by: FAMILY MEDICINE

## 2024-08-14 PROCEDURE — 1159F MED LIST DOCD IN RCRD: CPT | Performed by: FAMILY MEDICINE

## 2024-08-14 PROCEDURE — 85025 COMPLETE CBC W/AUTO DIFF WBC: CPT

## 2024-08-14 PROCEDURE — 99496 TRANSJ CARE MGMT HIGH F2F 7D: CPT | Performed by: FAMILY MEDICINE

## 2024-08-14 PROCEDURE — 1160F RVW MEDS BY RX/DR IN RCRD: CPT | Performed by: FAMILY MEDICINE

## 2024-08-14 PROCEDURE — G0299 HHS/HOSPICE OF RN EA 15 MIN: HCPCS | Mod: HHH

## 2024-08-14 PROCEDURE — 36415 COLL VENOUS BLD VENIPUNCTURE: CPT

## 2024-08-14 PROCEDURE — 3078F DIAST BP <80 MM HG: CPT | Performed by: FAMILY MEDICINE

## 2024-08-14 PROCEDURE — 80069 RENAL FUNCTION PANEL: CPT

## 2024-08-14 PROCEDURE — 1111F DSCHRG MED/CURRENT MED MERGE: CPT | Performed by: FAMILY MEDICINE

## 2024-08-14 PROCEDURE — 1157F ADVNC CARE PLAN IN RCRD: CPT | Performed by: FAMILY MEDICINE

## 2024-08-14 ASSESSMENT — ENCOUNTER SYMPTOMS
HIGHEST PAIN SEVERITY IN PAST 24 HOURS: 5/10
LOWEST PAIN SEVERITY IN PAST 24 HOURS: 0/10
PAIN SEVERITY GOAL: 0/10
MUSCLE WEAKNESS: 1
APPETITE LEVEL: FAIR
LOWER EXTREMITY EDEMA: 1

## 2024-08-14 ASSESSMENT — PAIN SCALES - PAIN ASSESSMENT IN ADVANCED DEMENTIA (PAINAD): BREATHING: 0

## 2024-08-14 NOTE — PROGRESS NOTES
Subjective   Patient ID: Prisca Fajardo is a 83 y.o. female who presents for Hospital Follow-up (08/02/2024/Pt requesting RX for handicap placard).    HPI   Prisca was seen today for a hospital follow-up.  She was admitted within  from August 2 to August 7, for failure to thrive, right facial cellulitis (status post Mohs procedure, failed outpatient treatment), chronic kidney disease, low bicarbonate, anemia.  All hospital records reviewed in their entirety, medication list reconciled.  While hospitalized, she was placed on IV antibiotics, subsequently discharged on Keflex, to be finished August 15.  Initial GFR on admission was 28, creatinine 1.76, BUN 55.  Bicarb was 8, chloride 119.  She was hydrated with IV fluids, given IV bicarbonate, subsequently discharged on twice daily oral bicarb.  Bicarb at discharge was 19, chloride 115, creatinine 1.7, GFR 29.  Outpatient nephrology follow-up recommended.  White blood cell count was mildly elevated on admission, 11.9.  H&H were 8.6/28.7.  After that her hemoglobin dipped to 7.2 (perhaps partially dilutional) she was transfused 1 unit of packed red blood cells, hemoglobin increased to 9.2 the next day.  Baseline hemoglobin is in the 9 range.  Indices suggested anemia of chronic disease.  Outpatient follow-up recommended.  Prisca had been scheduled for an EGD before cellulitis developed.  She has a history of colon/lung cancer, and this procedure was part of her failure to thrive workup.  Review of Systems  The full review of systems is negative with the exception of what is noted in HPI    Objective   /64 (BP Location: Left arm, Patient Position: Sitting, BP Cuff Size: Adult)   Pulse 74   Temp 36.9 °C (98.4 °F)   Resp 14   Wt 48.9 kg (107 lb 14.4 oz)   SpO2 98%   BMI 22.55 kg/m²     Physical Exam  Constitutional/General appearance: alert, oriented, well-appearing, in no distress  Head and face exam is normal  No scleral icterus or conjunctival erythema  present  Hearing is grossly normal  Respiratory effort is normal, no dyspnea noted  Cortical function is normal  Mood, affect, are pleasant, appropriate, and interactive.  Insight is normal  Lungs are clear bilaterally  Cardiac exam reveals a regular rate and rhythm, trace murmur noted  No lower extremity edema present  Right cheek surgical wound is dressed.  Assessment/Plan     Today we discussed multiple issues:  Facial cellulitis--- finish antibiotics, keep dermatology follow-up    Gout--- finish prednisone, monitor for recurrence, focus on good hydration, at least 64 ounces of water daily.    Anemia--- recheck blood counts, nephrology will weigh in regarding treatment of chronic disease anemia and CKD.  EGD that was previously scheduled should still be pursued when well enough, was scheduled prior to recent illness.     Low bicarb--bicarbonate prescription refilled today, nephrology will pursue additional workup.  Check labs, adjust accordingly if needed.    Lower extremity swelling, likely largely due to third spacing, perhaps increased by chronic kidney disease and anemia, was hydrated throughout recent admission.  Continue torsemide every other day for now, regimen may change based on today's lab results.  Defer to nephrology thereafter.    Right shoulder pain, likely frozen shoulder, continue home therapy, x-ray order placed.  Can use ice or Tylenol for pain if needed, must avoid anti-inflammatories because of CKD.    Disability placard prescription given, can take to the [facility] to complete application     Will arrange follow-up based on upcoming lab results.    Total time spent with patient, reviewing records, and completing charting was over 60 minutes, over half of it spent counseling and/or coordinating care  **Portions of this medical record have been created using voice recognition software and may have minor errors which are inherent in voice recognition systems. It has not been fully edited for  typographical or grammatical errors**

## 2024-08-14 NOTE — PATIENT INSTRUCTIONS
Facial cellulitis--- finish antibiotics, keep dermatology follow-up    Gout--- finish prednisone    Anemia--- recheck blood counts, nephrology referral for future management since kidney related     Low bicarb--low bicarb, prescription refilled today, nephrology will pursue additional workup.    Lower extremity swelling, continue torsemide every other day for now, regimen may change based on today's lab results    Right shoulder pain, likely frozen shoulder, continue home therapy, x-ray order provided

## 2024-08-15 DIAGNOSIS — D63.8 ANEMIA, CHRONIC DISEASE: Primary | ICD-10-CM

## 2024-08-15 DIAGNOSIS — N18.32 CHRONIC KIDNEY DISEASE (CKD) STAGE G3B/A2, MODERATELY DECREASED GLOMERULAR FILTRATION RATE (GFR) BETWEEN 30-44 ML/MIN/1.73 SQUARE METER AND ALBUMINURIA CREATININE RATIO BETWEEN 30-299 MG/G (C* (MULTI): Primary | ICD-10-CM

## 2024-08-15 DIAGNOSIS — N18.32 STAGE 3B CHRONIC KIDNEY DISEASE (MULTI): ICD-10-CM

## 2024-08-15 DIAGNOSIS — I50.32 CHRONIC DIASTOLIC HEART FAILURE (MULTI): ICD-10-CM

## 2024-08-15 DIAGNOSIS — E87.8 LOW BICARBONATE LEVEL: ICD-10-CM

## 2024-08-15 LAB
ALBUMIN SERPL BCP-MCNC: 2.6 G/DL (ref 3.4–5)
ANION GAP SERPL CALC-SCNC: 14 MMOL/L (ref 10–20)
BASOPHILS # BLD AUTO: 0.02 X10*3/UL (ref 0–0.1)
BASOPHILS NFR BLD AUTO: 0.2 %
BUN SERPL-MCNC: 41 MG/DL (ref 6–23)
CALCIUM SERPL-MCNC: 8.3 MG/DL (ref 8.6–10.6)
CHLORIDE SERPL-SCNC: 116 MMOL/L (ref 98–107)
CO2 SERPL-SCNC: 21 MMOL/L (ref 21–32)
CREAT SERPL-MCNC: 1.45 MG/DL (ref 0.5–1.05)
EGFRCR SERPLBLD CKD-EPI 2021: 35 ML/MIN/1.73M*2
EOSINOPHIL # BLD AUTO: 0.63 X10*3/UL (ref 0–0.4)
EOSINOPHIL NFR BLD AUTO: 7.2 %
ERYTHROCYTE [DISTWIDTH] IN BLOOD BY AUTOMATED COUNT: 13.9 % (ref 11.5–14.5)
GLUCOSE SERPL-MCNC: 102 MG/DL (ref 74–99)
HCT VFR BLD AUTO: 28.9 % (ref 36–46)
HGB BLD-MCNC: 8.6 G/DL (ref 12–16)
IMM GRANULOCYTES # BLD AUTO: 0.09 X10*3/UL (ref 0–0.5)
IMM GRANULOCYTES NFR BLD AUTO: 1 % (ref 0–0.9)
LYMPHOCYTES # BLD AUTO: 1.16 X10*3/UL (ref 0.8–3)
LYMPHOCYTES NFR BLD AUTO: 13.3 %
MCH RBC QN AUTO: 31.7 PG (ref 26–34)
MCHC RBC AUTO-ENTMCNC: 29.8 G/DL (ref 32–36)
MCV RBC AUTO: 107 FL (ref 80–100)
MONOCYTES # BLD AUTO: 1 X10*3/UL (ref 0.05–0.8)
MONOCYTES NFR BLD AUTO: 11.5 %
NEUTROPHILS # BLD AUTO: 5.83 X10*3/UL (ref 1.6–5.5)
NEUTROPHILS NFR BLD AUTO: 66.8 %
NRBC BLD-RTO: 0 /100 WBCS (ref 0–0)
PHOSPHATE SERPL-MCNC: 4 MG/DL (ref 2.5–4.9)
PLATELET # BLD AUTO: 275 X10*3/UL (ref 150–450)
POTASSIUM SERPL-SCNC: 4.7 MMOL/L (ref 3.5–5.3)
RBC # BLD AUTO: 2.71 X10*6/UL (ref 4–5.2)
SODIUM SERPL-SCNC: 146 MMOL/L (ref 136–145)
WBC # BLD AUTO: 8.7 X10*3/UL (ref 4.4–11.3)

## 2024-08-15 RX ORDER — SODIUM BICARBONATE 650 MG/1
650 TABLET ORAL 2 TIMES DAILY
Qty: 60 TABLET | Refills: 2 | Status: SHIPPED | OUTPATIENT
Start: 2024-08-15 | End: 2024-08-15

## 2024-08-15 RX ORDER — SODIUM BICARBONATE 650 MG/1
650 TABLET ORAL 3 TIMES DAILY
Qty: 90 TABLET | Refills: 2 | Status: SHIPPED | OUTPATIENT
Start: 2024-08-15 | End: 2024-08-25 | Stop reason: DRUGHIGH

## 2024-08-16 ENCOUNTER — HOME CARE VISIT (OUTPATIENT)
Dept: HOME HEALTH SERVICES | Facility: HOME HEALTH | Age: 84
End: 2024-08-16
Payer: MEDICARE

## 2024-08-16 VITALS
OXYGEN SATURATION: 99 % | HEART RATE: 68 BPM | DIASTOLIC BLOOD PRESSURE: 58 MMHG | RESPIRATION RATE: 18 BRPM | TEMPERATURE: 98 F | SYSTOLIC BLOOD PRESSURE: 112 MMHG

## 2024-08-16 PROCEDURE — G0299 HHS/HOSPICE OF RN EA 15 MIN: HCPCS | Mod: HHH

## 2024-08-16 ASSESSMENT — ENCOUNTER SYMPTOMS
SUBJECTIVE PAIN PROGRESSION: UNCHANGED
FATIGUES EASILY: 1
PAIN: 1
PAIN LOCATION - PAIN QUALITY: BURNING
LOWEST PAIN SEVERITY IN PAST 24 HOURS: 2/10
PAIN LOCATION - PAIN SEVERITY: 3/10
PAIN SEVERITY GOAL: 0/10
HIGHEST PAIN SEVERITY IN PAST 24 HOURS: 6/10
LAST BOWEL MOVEMENT: 67067
PAIN LOCATION - PAIN FREQUENCY: FREQUENT
FATIGUE: 1
PERSON REPORTING PAIN: PATIENT
APPETITE LEVEL: GOOD
LOWER EXTREMITY EDEMA: 1
MUSCLE WEAKNESS: 1
CHANGE IN APPETITE: UNCHANGED
PAIN LOCATION: FACE

## 2024-08-19 ENCOUNTER — HOME CARE VISIT (OUTPATIENT)
Dept: HOME HEALTH SERVICES | Facility: HOME HEALTH | Age: 84
End: 2024-08-19
Payer: MEDICARE

## 2024-08-19 VITALS
TEMPERATURE: 98.2 F | DIASTOLIC BLOOD PRESSURE: 60 MMHG | HEART RATE: 72 BPM | RESPIRATION RATE: 18 BRPM | SYSTOLIC BLOOD PRESSURE: 110 MMHG

## 2024-08-19 PROCEDURE — G0299 HHS/HOSPICE OF RN EA 15 MIN: HCPCS | Mod: HHH

## 2024-08-19 ASSESSMENT — ENCOUNTER SYMPTOMS
MUSCLE WEAKNESS: 1
LOWEST PAIN SEVERITY IN PAST 24 HOURS: 0/10
LOWER EXTREMITY EDEMA: 1
PAIN SEVERITY GOAL: 0/10
HIGHEST PAIN SEVERITY IN PAST 24 HOURS: 0/10
APPETITE LEVEL: FAIR

## 2024-08-19 ASSESSMENT — PAIN SCALES - PAIN ASSESSMENT IN ADVANCED DEMENTIA (PAINAD): BREATHING: 0

## 2024-08-20 ENCOUNTER — APPOINTMENT (OUTPATIENT)
Dept: DERMATOLOGY | Facility: CLINIC | Age: 84
End: 2024-08-20
Payer: MEDICARE

## 2024-08-21 ENCOUNTER — HOME CARE VISIT (OUTPATIENT)
Dept: HOME HEALTH SERVICES | Facility: HOME HEALTH | Age: 84
End: 2024-08-21
Payer: MEDICARE

## 2024-08-21 ENCOUNTER — HOSPITAL ENCOUNTER (OUTPATIENT)
Dept: RADIOLOGY | Facility: CLINIC | Age: 84
Discharge: HOME | End: 2024-08-21
Payer: MEDICARE

## 2024-08-21 ENCOUNTER — LAB (OUTPATIENT)
Dept: LAB | Facility: LAB | Age: 84
End: 2024-08-21
Payer: MEDICARE

## 2024-08-21 VITALS
DIASTOLIC BLOOD PRESSURE: 60 MMHG | OXYGEN SATURATION: 99 % | SYSTOLIC BLOOD PRESSURE: 120 MMHG | HEART RATE: 77 BPM | RESPIRATION RATE: 18 BRPM | TEMPERATURE: 97.8 F

## 2024-08-21 DIAGNOSIS — M25.511 CHRONIC RIGHT SHOULDER PAIN: ICD-10-CM

## 2024-08-21 DIAGNOSIS — E87.8 LOW BICARBONATE LEVEL: ICD-10-CM

## 2024-08-21 DIAGNOSIS — G89.29 CHRONIC RIGHT SHOULDER PAIN: ICD-10-CM

## 2024-08-21 DIAGNOSIS — N18.32 STAGE 3B CHRONIC KIDNEY DISEASE (MULTI): ICD-10-CM

## 2024-08-21 DIAGNOSIS — D63.8 ANEMIA, CHRONIC DISEASE: ICD-10-CM

## 2024-08-21 LAB
ACID FAST STN SPEC: NORMAL
MYCOBACTERIUM SPEC CULT: NORMAL

## 2024-08-21 PROCEDURE — G0157 HHC PT ASSISTANT EA 15: HCPCS | Mod: CQ,HHH

## 2024-08-21 PROCEDURE — 36415 COLL VENOUS BLD VENIPUNCTURE: CPT

## 2024-08-21 PROCEDURE — 80048 BASIC METABOLIC PNL TOTAL CA: CPT

## 2024-08-21 PROCEDURE — G0299 HHS/HOSPICE OF RN EA 15 MIN: HCPCS | Mod: HHH

## 2024-08-21 PROCEDURE — 73030 X-RAY EXAM OF SHOULDER: CPT | Mod: RT

## 2024-08-21 PROCEDURE — 85025 COMPLETE CBC W/AUTO DIFF WBC: CPT

## 2024-08-21 ASSESSMENT — ENCOUNTER SYMPTOMS
PAIN SEVERITY GOAL: 0/10
PAIN SEVERITY GOAL: 0/10
LOWEST PAIN SEVERITY IN PAST 24 HOURS: 0/10
MUSCLE WEAKNESS: 1
PAIN LOCATION: RIGHT SHOULDER
HIGHEST PAIN SEVERITY IN PAST 24 HOURS: 0/10
PAIN: 1
APPETITE LEVEL: FAIR
LOWER EXTREMITY EDEMA: 1
LOWEST PAIN SEVERITY IN PAST 24 HOURS: 5/10
HIGHEST PAIN SEVERITY IN PAST 24 HOURS: 6/10
PERSON REPORTING PAIN: PATIENT

## 2024-08-21 ASSESSMENT — PAIN SCALES - PAIN ASSESSMENT IN ADVANCED DEMENTIA (PAINAD): BREATHING: 0

## 2024-08-22 LAB
ANION GAP SERPL CALC-SCNC: 16 MMOL/L (ref 10–20)
BASOPHILS # BLD AUTO: 0.05 X10*3/UL (ref 0–0.1)
BASOPHILS NFR BLD AUTO: 0.7 %
BUN SERPL-MCNC: 65 MG/DL (ref 6–23)
CALCIUM SERPL-MCNC: 8.5 MG/DL (ref 8.6–10.6)
CHLORIDE SERPL-SCNC: 119 MMOL/L (ref 98–107)
CO2 SERPL-SCNC: 17 MMOL/L (ref 21–32)
CREAT SERPL-MCNC: 1.94 MG/DL (ref 0.5–1.05)
EGFRCR SERPLBLD CKD-EPI 2021: 25 ML/MIN/1.73M*2
EOSINOPHIL # BLD AUTO: 0.47 X10*3/UL (ref 0–0.4)
EOSINOPHIL NFR BLD AUTO: 6.3 %
ERYTHROCYTE [DISTWIDTH] IN BLOOD BY AUTOMATED COUNT: 13.8 % (ref 11.5–14.5)
GLUCOSE SERPL-MCNC: 95 MG/DL (ref 74–99)
HCT VFR BLD AUTO: 29 % (ref 36–46)
HGB BLD-MCNC: 8.4 G/DL (ref 12–16)
IMM GRANULOCYTES # BLD AUTO: 0.07 X10*3/UL (ref 0–0.5)
IMM GRANULOCYTES NFR BLD AUTO: 0.9 % (ref 0–0.9)
LYMPHOCYTES # BLD AUTO: 1.53 X10*3/UL (ref 0.8–3)
LYMPHOCYTES NFR BLD AUTO: 20.5 %
MCH RBC QN AUTO: 31.8 PG (ref 26–34)
MCHC RBC AUTO-ENTMCNC: 29 G/DL (ref 32–36)
MCV RBC AUTO: 110 FL (ref 80–100)
MONOCYTES # BLD AUTO: 0.76 X10*3/UL (ref 0.05–0.8)
MONOCYTES NFR BLD AUTO: 10.2 %
NEUTROPHILS # BLD AUTO: 4.59 X10*3/UL (ref 1.6–5.5)
NEUTROPHILS NFR BLD AUTO: 61.4 %
NRBC BLD-RTO: 0 /100 WBCS (ref 0–0)
PLATELET # BLD AUTO: 328 X10*3/UL (ref 150–450)
POTASSIUM SERPL-SCNC: 4.7 MMOL/L (ref 3.5–5.3)
RBC # BLD AUTO: 2.64 X10*6/UL (ref 4–5.2)
SODIUM SERPL-SCNC: 147 MMOL/L (ref 136–145)
WBC # BLD AUTO: 7.5 X10*3/UL (ref 4.4–11.3)

## 2024-08-23 ENCOUNTER — PATIENT OUTREACH (OUTPATIENT)
Dept: PRIMARY CARE | Facility: CLINIC | Age: 84
End: 2024-08-23
Payer: MEDICARE

## 2024-08-23 ENCOUNTER — HOME CARE VISIT (OUTPATIENT)
Dept: HOME HEALTH SERVICES | Facility: HOME HEALTH | Age: 84
End: 2024-08-23
Payer: MEDICARE

## 2024-08-23 VITALS — OXYGEN SATURATION: 100 % | HEART RATE: 84 BPM

## 2024-08-23 VITALS
OXYGEN SATURATION: 100 % | SYSTOLIC BLOOD PRESSURE: 118 MMHG | DIASTOLIC BLOOD PRESSURE: 58 MMHG | TEMPERATURE: 98.1 F | HEART RATE: 72 BPM

## 2024-08-23 PROCEDURE — G0157 HHC PT ASSISTANT EA 15: HCPCS | Mod: CQ,HHH

## 2024-08-23 PROCEDURE — G0299 HHS/HOSPICE OF RN EA 15 MIN: HCPCS | Mod: HHH

## 2024-08-23 ASSESSMENT — ENCOUNTER SYMPTOMS
PAIN: 1
SUBJECTIVE PAIN PROGRESSION: WAXING AND WANING
PERSON REPORTING PAIN: PATIENT
PAIN SEVERITY GOAL: 0/10
PAIN LOCATION: RIGHT SHOULDER
HIGHEST PAIN SEVERITY IN PAST 24 HOURS: 2/10
LOWEST PAIN SEVERITY IN PAST 24 HOURS: 1/10
HIGHEST PAIN SEVERITY IN PAST 24 HOURS: 0/10
MUSCLE WEAKNESS: 1
LOWEST PAIN SEVERITY IN PAST 24 HOURS: 0/10
LOWER EXTREMITY EDEMA: 1
APPETITE LEVEL: FAIR
PAIN SEVERITY GOAL: 0/10

## 2024-08-23 ASSESSMENT — PAIN SCALES - PAIN ASSESSMENT IN ADVANCED DEMENTIA (PAINAD): BREATHING: 1

## 2024-08-23 NOTE — PROGRESS NOTES
Unable to reach patient for call back after patient's follow up appointment with PCP.   MILLAM with call back number for patient to call if needed   If no voicemail available call attempts x 2 were made to contact the patient to assist with any questions or concerns patient may have.

## 2024-08-25 DIAGNOSIS — E87.20 METABOLIC ACIDOSIS: ICD-10-CM

## 2024-08-25 DIAGNOSIS — N18.32 STAGE 3B CHRONIC KIDNEY DISEASE (MULTI): Primary | ICD-10-CM

## 2024-08-25 DIAGNOSIS — E87.8 LOW BICARBONATE LEVEL: ICD-10-CM

## 2024-08-25 DIAGNOSIS — D63.8 ANEMIA, CHRONIC DISEASE: ICD-10-CM

## 2024-08-25 RX ORDER — SODIUM BICARBONATE 650 MG/1
TABLET ORAL
Qty: 120 TABLET | Refills: 1 | Status: SHIPPED | OUTPATIENT
Start: 2024-08-25

## 2024-08-26 ENCOUNTER — HOME CARE VISIT (OUTPATIENT)
Dept: HOME HEALTH SERVICES | Facility: HOME HEALTH | Age: 84
End: 2024-08-26
Payer: MEDICARE

## 2024-08-26 VITALS
SYSTOLIC BLOOD PRESSURE: 120 MMHG | TEMPERATURE: 98.4 F | RESPIRATION RATE: 18 BRPM | DIASTOLIC BLOOD PRESSURE: 60 MMHG | HEART RATE: 71 BPM | OXYGEN SATURATION: 99 %

## 2024-08-26 PROCEDURE — G0299 HHS/HOSPICE OF RN EA 15 MIN: HCPCS | Mod: HHH

## 2024-08-26 ASSESSMENT — ENCOUNTER SYMPTOMS
LOWER EXTREMITY EDEMA: 1
LOWEST PAIN SEVERITY IN PAST 24 HOURS: 0/10
MUSCLE WEAKNESS: 1
HIGHEST PAIN SEVERITY IN PAST 24 HOURS: 0/10
APPETITE LEVEL: GOOD
PAIN SEVERITY GOAL: 0/10

## 2024-08-26 ASSESSMENT — PAIN SCALES - PAIN ASSESSMENT IN ADVANCED DEMENTIA (PAINAD): BREATHING: 0

## 2024-08-28 ENCOUNTER — HOME CARE VISIT (OUTPATIENT)
Dept: HOME HEALTH SERVICES | Facility: HOME HEALTH | Age: 84
End: 2024-08-28
Payer: MEDICARE

## 2024-08-28 VITALS
OXYGEN SATURATION: 98 % | SYSTOLIC BLOOD PRESSURE: 104 MMHG | DIASTOLIC BLOOD PRESSURE: 60 MMHG | RESPIRATION RATE: 18 BRPM | TEMPERATURE: 98.6 F | HEART RATE: 67 BPM

## 2024-08-28 LAB
ACID FAST STN SPEC: NORMAL
MYCOBACTERIUM SPEC CULT: NORMAL

## 2024-08-28 PROCEDURE — G0157 HHC PT ASSISTANT EA 15: HCPCS | Mod: CQ,HHH

## 2024-08-28 PROCEDURE — G0299 HHS/HOSPICE OF RN EA 15 MIN: HCPCS | Mod: HHH

## 2024-08-28 ASSESSMENT — ENCOUNTER SYMPTOMS
LOWEST PAIN SEVERITY IN PAST 24 HOURS: 0/10
LOWEST PAIN SEVERITY IN PAST 24 HOURS: 3/10
LOWER EXTREMITY EDEMA: 1
HIGHEST PAIN SEVERITY IN PAST 24 HOURS: 5/10
PERSON REPORTING PAIN: PATIENT
HIGHEST PAIN SEVERITY IN PAST 24 HOURS: 0/10
PAIN: 1
PAIN LOCATION: RIGHT SHOULDER
SUBJECTIVE PAIN PROGRESSION: WAXING AND WANING
MUSCLE WEAKNESS: 1
PAIN SEVERITY GOAL: 0/10
APPETITE LEVEL: GOOD
PAIN SEVERITY GOAL: 0/10

## 2024-08-28 ASSESSMENT — PAIN SCALES - PAIN ASSESSMENT IN ADVANCED DEMENTIA (PAINAD): BREATHING: 0

## 2024-08-30 ENCOUNTER — APPOINTMENT (OUTPATIENT)
Dept: CARDIOLOGY | Facility: CLINIC | Age: 84
End: 2024-08-30
Payer: MEDICARE

## 2024-08-30 ENCOUNTER — HOME CARE VISIT (OUTPATIENT)
Dept: HOME HEALTH SERVICES | Facility: HOME HEALTH | Age: 84
End: 2024-08-30
Payer: MEDICARE

## 2024-08-30 ENCOUNTER — LAB (OUTPATIENT)
Dept: LAB | Facility: LAB | Age: 84
End: 2024-08-30
Payer: MEDICARE

## 2024-08-30 VITALS
DIASTOLIC BLOOD PRESSURE: 62 MMHG | TEMPERATURE: 98.3 F | OXYGEN SATURATION: 98 % | HEART RATE: 71 BPM | RESPIRATION RATE: 18 BRPM | SYSTOLIC BLOOD PRESSURE: 114 MMHG

## 2024-08-30 VITALS
WEIGHT: 112 LBS | DIASTOLIC BLOOD PRESSURE: 62 MMHG | HEART RATE: 73 BPM | RESPIRATION RATE: 16 BRPM | OXYGEN SATURATION: 97 % | BODY MASS INDEX: 23.41 KG/M2 | SYSTOLIC BLOOD PRESSURE: 155 MMHG

## 2024-08-30 DIAGNOSIS — I10 ESSENTIAL HYPERTENSION: ICD-10-CM

## 2024-08-30 DIAGNOSIS — N18.32 CHRONIC KIDNEY DISEASE (CKD) STAGE G3B/A2, MODERATELY DECREASED GLOMERULAR FILTRATION RATE (GFR) BETWEEN 30-44 ML/MIN/1.73 SQUARE METER AND ALBUMINURIA CREATININE RATIO BETWEEN 30-299 MG/G (C* (MULTI): ICD-10-CM

## 2024-08-30 DIAGNOSIS — E87.20 METABOLIC ACIDOSIS: ICD-10-CM

## 2024-08-30 DIAGNOSIS — I50.32 CHRONIC DIASTOLIC HEART FAILURE (MULTI): ICD-10-CM

## 2024-08-30 DIAGNOSIS — E87.8 LOW BICARBONATE LEVEL: ICD-10-CM

## 2024-08-30 DIAGNOSIS — I34.0 NONRHEUMATIC MITRAL VALVE REGURGITATION: ICD-10-CM

## 2024-08-30 DIAGNOSIS — R06.09 DYSPNEA ON EXERTION: ICD-10-CM

## 2024-08-30 DIAGNOSIS — N18.32 STAGE 3B CHRONIC KIDNEY DISEASE (MULTI): ICD-10-CM

## 2024-08-30 DIAGNOSIS — D63.8 ANEMIA, CHRONIC DISEASE: ICD-10-CM

## 2024-08-30 PROCEDURE — 99213 OFFICE O/P EST LOW 20 MIN: CPT | Performed by: STUDENT IN AN ORGANIZED HEALTH CARE EDUCATION/TRAINING PROGRAM

## 2024-08-30 PROCEDURE — 80053 COMPREHEN METABOLIC PANEL: CPT

## 2024-08-30 PROCEDURE — G0299 HHS/HOSPICE OF RN EA 15 MIN: HCPCS | Mod: HHH

## 2024-08-30 PROCEDURE — 1159F MED LIST DOCD IN RCRD: CPT | Performed by: STUDENT IN AN ORGANIZED HEALTH CARE EDUCATION/TRAINING PROGRAM

## 2024-08-30 PROCEDURE — 3077F SYST BP >= 140 MM HG: CPT | Performed by: STUDENT IN AN ORGANIZED HEALTH CARE EDUCATION/TRAINING PROGRAM

## 2024-08-30 PROCEDURE — 1157F ADVNC CARE PLAN IN RCRD: CPT | Performed by: STUDENT IN AN ORGANIZED HEALTH CARE EDUCATION/TRAINING PROGRAM

## 2024-08-30 PROCEDURE — 85027 COMPLETE CBC AUTOMATED: CPT

## 2024-08-30 PROCEDURE — 36415 COLL VENOUS BLD VENIPUNCTURE: CPT

## 2024-08-30 PROCEDURE — 1160F RVW MEDS BY RX/DR IN RCRD: CPT | Performed by: STUDENT IN AN ORGANIZED HEALTH CARE EDUCATION/TRAINING PROGRAM

## 2024-08-30 PROCEDURE — 3078F DIAST BP <80 MM HG: CPT | Performed by: STUDENT IN AN ORGANIZED HEALTH CARE EDUCATION/TRAINING PROGRAM

## 2024-08-30 PROCEDURE — 1111F DSCHRG MED/CURRENT MED MERGE: CPT | Performed by: STUDENT IN AN ORGANIZED HEALTH CARE EDUCATION/TRAINING PROGRAM

## 2024-08-30 RX ORDER — TORSEMIDE 20 MG/1
20 TABLET ORAL DAILY
Qty: 90 TABLET | Refills: 1 | Status: SHIPPED | OUTPATIENT
Start: 2024-08-30 | End: 2025-02-26

## 2024-08-30 RX ORDER — CARVEDILOL 6.25 MG/1
6.25 TABLET ORAL EVERY 12 HOURS
Qty: 180 TABLET | Refills: 1 | Status: SHIPPED | OUTPATIENT
Start: 2024-08-30 | End: 2025-02-26

## 2024-08-30 RX ORDER — LOSARTAN POTASSIUM 50 MG/1
50 TABLET ORAL DAILY
Qty: 90 TABLET | Refills: 1 | Status: SHIPPED | OUTPATIENT
Start: 2024-08-30 | End: 2025-02-26

## 2024-08-30 ASSESSMENT — ENCOUNTER SYMPTOMS
DYSPNEA ON EXERTION: 1
MUSCLE WEAKNESS: 1
SYNCOPE: 0
ENDOCRINE NEGATIVE: 1
LOWEST PAIN SEVERITY IN PAST 24 HOURS: 0/10
SHORTNESS OF BREATH: 1
GASTROINTESTINAL NEGATIVE: 1
ALLERGIC/IMMUNOLOGIC NEGATIVE: 1
HEMATOLOGIC/LYMPHATIC NEGATIVE: 1
PSYCHIATRIC NEGATIVE: 1
APPETITE LEVEL: GOOD
EYES NEGATIVE: 1
HIGHEST PAIN SEVERITY IN PAST 24 HOURS: 0/10
NEUROLOGICAL NEGATIVE: 1
LOWER EXTREMITY EDEMA: 1
PAIN SEVERITY GOAL: 0/10
ORTHOPNEA: 0
PND: 0
MUSCULOSKELETAL NEGATIVE: 1
NEAR-SYNCOPE: 0
PALPITATIONS: 0
CONSTITUTIONAL NEGATIVE: 1

## 2024-08-30 ASSESSMENT — PAIN SCALES - PAIN ASSESSMENT IN ADVANCED DEMENTIA (PAINAD): BREATHING: 0

## 2024-08-30 NOTE — PATIENT INSTRUCTIONS
Thank you for your visit today. Please contact our office (via MyChart or phone) with any additional questions.     [facility]    Denisse, RN/Clinic Nurse for:    Dr. Adriana Blackmon    [address], [address]  [place] [address]    Phone: [phone] Press Option 5 then Option 3 to speak with the Clinic Nurse (Denisse)    _____    To Reach:    Billing Questions -    [phone]  Scheduling / Rescheduling -  Option 1  Refills / Medication Requests -  Option 3  General Office / [place] -  Option 4  Results -     Option 6  Medical Records -    Option 7  Repeat Options -    Option 9

## 2024-08-31 LAB
ALBUMIN SERPL BCP-MCNC: 3 G/DL (ref 3.4–5)
ALP SERPL-CCNC: 59 U/L (ref 33–136)
ALT SERPL W P-5'-P-CCNC: 8 U/L (ref 7–45)
ANION GAP SERPL CALC-SCNC: 16 MMOL/L (ref 10–20)
AST SERPL W P-5'-P-CCNC: 16 U/L (ref 9–39)
BILIRUB SERPL-MCNC: 0.4 MG/DL (ref 0–1.2)
BUN SERPL-MCNC: 29 MG/DL (ref 6–23)
CALCIUM SERPL-MCNC: 8.8 MG/DL (ref 8.6–10.6)
CHLORIDE SERPL-SCNC: 114 MMOL/L (ref 98–107)
CO2 SERPL-SCNC: 22 MMOL/L (ref 21–32)
CREAT SERPL-MCNC: 1.35 MG/DL (ref 0.5–1.05)
EGFRCR SERPLBLD CKD-EPI 2021: 39 ML/MIN/1.73M*2
ERYTHROCYTE [DISTWIDTH] IN BLOOD BY AUTOMATED COUNT: 14.1 % (ref 11.5–14.5)
GLUCOSE SERPL-MCNC: 81 MG/DL (ref 74–99)
HCT VFR BLD AUTO: 27.6 % (ref 36–46)
HGB BLD-MCNC: 8.2 G/DL (ref 12–16)
MCH RBC QN AUTO: 31.3 PG (ref 26–34)
MCHC RBC AUTO-ENTMCNC: 29.7 G/DL (ref 32–36)
MCV RBC AUTO: 105 FL (ref 80–100)
NRBC BLD-RTO: 0 /100 WBCS (ref 0–0)
PLATELET # BLD AUTO: 307 X10*3/UL (ref 150–450)
POTASSIUM SERPL-SCNC: 4.6 MMOL/L (ref 3.5–5.3)
PROT SERPL-MCNC: 5.5 G/DL (ref 6.4–8.2)
RBC # BLD AUTO: 2.62 X10*6/UL (ref 4–5.2)
SODIUM SERPL-SCNC: 147 MMOL/L (ref 136–145)
WBC # BLD AUTO: 7.6 X10*3/UL (ref 4.4–11.3)

## 2024-09-02 ENCOUNTER — HOME CARE VISIT (OUTPATIENT)
Dept: HOME HEALTH SERVICES | Facility: HOME HEALTH | Age: 84
End: 2024-09-02
Payer: MEDICARE

## 2024-09-02 VITALS
HEART RATE: 69 BPM | DIASTOLIC BLOOD PRESSURE: 70 MMHG | TEMPERATURE: 98.2 F | SYSTOLIC BLOOD PRESSURE: 120 MMHG | RESPIRATION RATE: 18 BRPM | OXYGEN SATURATION: 99 %

## 2024-09-02 PROCEDURE — G0299 HHS/HOSPICE OF RN EA 15 MIN: HCPCS | Mod: HHH

## 2024-09-02 ASSESSMENT — ENCOUNTER SYMPTOMS
APPETITE LEVEL: GOOD
PAIN SEVERITY GOAL: 0/10
LOWER EXTREMITY EDEMA: 1
MUSCLE WEAKNESS: 1
LOWEST PAIN SEVERITY IN PAST 24 HOURS: 0/10
HIGHEST PAIN SEVERITY IN PAST 24 HOURS: 0/10

## 2024-09-02 ASSESSMENT — PAIN SCALES - PAIN ASSESSMENT IN ADVANCED DEMENTIA (PAINAD): BREATHING: 0

## 2024-09-03 ENCOUNTER — HOME CARE VISIT (OUTPATIENT)
Dept: HOME HEALTH SERVICES | Facility: HOME HEALTH | Age: 84
End: 2024-09-03
Payer: MEDICARE

## 2024-09-03 PROCEDURE — G0157 HHC PT ASSISTANT EA 15: HCPCS | Mod: CQ,HHH

## 2024-09-03 ASSESSMENT — ENCOUNTER SYMPTOMS
PAIN: 1
LOWEST PAIN SEVERITY IN PAST 24 HOURS: 5/10
HIGHEST PAIN SEVERITY IN PAST 24 HOURS: 5/10
PAIN LOCATION: RIGHT SHOULDER
SUBJECTIVE PAIN PROGRESSION: WAXING AND WANING
PAIN SEVERITY GOAL: 0/10
PERSON REPORTING PAIN: PATIENT

## 2024-09-05 ENCOUNTER — HOME CARE VISIT (OUTPATIENT)
Dept: HOME HEALTH SERVICES | Facility: HOME HEALTH | Age: 84
End: 2024-09-05
Payer: MEDICARE

## 2024-09-05 VITALS — RESPIRATION RATE: 16 BRPM

## 2024-09-05 PROCEDURE — G0151 HHCP-SERV OF PT,EA 15 MIN: HCPCS | Mod: HHH

## 2024-09-05 SDOH — HEALTH STABILITY: PHYSICAL HEALTH: EXERCISE TYPE: WHEP

## 2024-09-05 SDOH — HEALTH STABILITY: PHYSICAL HEALTH: EXERCISE ACTIVITY: SUPINE AND SITTING

## 2024-09-05 SDOH — HEALTH STABILITY: PHYSICAL HEALTH: PHYSICAL EXERCISE: SITTING AND SUPINE

## 2024-09-05 SDOH — HEALTH STABILITY: PHYSICAL HEALTH: EXERCISE ACTIVITIES SETS: 1

## 2024-09-05 SDOH — HEALTH STABILITY: PHYSICAL HEALTH: PHYSICAL EXERCISE: 10

## 2024-09-05 ASSESSMENT — GAIT ASSESSMENTS
STEP CONTINUITY: 1 - STEPS APPEAR CONTINUOUS
TRUNK SCORE: 1
WALKING STANCE: 0 - HEELS APART
PATH SCORE: 2
STEP SYMMETRY: 1 - RIGHT AND LEFT STEP LENGTH APPEAR EQUAL
INITIATION OF GAIT IMMEDIATELY AFTER GO: 1 - NO HESITANCY
TRUNK: 1 - NO SWAY BUT FLEXION OF KNEES OR BACK OR SPREADS ARMS WHILE WALKING
PATH: 2 - STRAIGHT WITHOUT WALKING AID
BALANCE AND GAIT SCORE: 24
GAIT SCORE: 10

## 2024-09-05 ASSESSMENT — BALANCE ASSESSMENTS
NUDGED SCORE: 2
EYES CLOSED AT MAXIMUM POSITION NUDGED: 1 - STEADY
SITTING BALANCE: 1 - STEADY, SAFE
IMMEDIATE STANDING BALANCE FIRST 5 SECONDS: 2 - STEADY WITHOUT WALKER OR OTHER SUPPORT
TURNING 360 DEGREES STEPS: 1 - CONTINUOUS STEPS
ARISES: 2 - ABLE WITHOUT USING ARMS
NUDGED: 2 - STEADY
ARISING SCORE: 2
BALANCE SCORE: 14
ATTEMPTS TO ARISE: 2 - ABLE TO RISE, ONE ATTEMPT
STANDING BALANCE: 1 - STEADY BUT WIDE STANCE AND USES CANE OR OTHER SUPPORT
SITTING DOWN: 1 - USES ARMS OR NOT SMOOTH MOTION

## 2024-09-05 ASSESSMENT — ENCOUNTER SYMPTOMS
PAIN: 1
PAIN LOCATION: BACK
SUBJECTIVE PAIN PROGRESSION: GRADUALLY IMPROVING
PERSON REPORTING PAIN: PATIENT
HIGHEST PAIN SEVERITY IN PAST 24 HOURS: 3/10
PAIN SEVERITY GOAL: 0/10
LOWEST PAIN SEVERITY IN PAST 24 HOURS: 0/10

## 2024-09-05 ASSESSMENT — ACTIVITIES OF DAILY LIVING (ADL)
AMBULATION_DISTANCE/DURATION_TOLERATED: 200 FEET X2
AMBULATION ASSISTANCE ON FLAT SURFACES: 1

## 2024-09-05 NOTE — CASE COMMUNICATION
Patient to be discharged from PT 9.5.24 with goals met and RN still active in home.  Patient instructed in signs and symptoms of infection, when to call MD or 911, fall prevention, safe progression of WHEP, safety with household transfers and ambulation with assistive device.

## 2024-09-06 ENCOUNTER — HOME CARE VISIT (OUTPATIENT)
Dept: HOME HEALTH SERVICES | Facility: HOME HEALTH | Age: 84
End: 2024-09-06
Payer: MEDICARE

## 2024-09-06 VITALS
TEMPERATURE: 98 F | OXYGEN SATURATION: 98 % | HEART RATE: 75 BPM | DIASTOLIC BLOOD PRESSURE: 60 MMHG | SYSTOLIC BLOOD PRESSURE: 135 MMHG | RESPIRATION RATE: 18 BRPM

## 2024-09-06 PROCEDURE — G0299 HHS/HOSPICE OF RN EA 15 MIN: HCPCS | Mod: HHH

## 2024-09-06 ASSESSMENT — ENCOUNTER SYMPTOMS
HIGHEST PAIN SEVERITY IN PAST 24 HOURS: 0/10
APPETITE LEVEL: FAIR
LOWEST PAIN SEVERITY IN PAST 24 HOURS: 0/10
PAIN SEVERITY GOAL: 0/10
LOWER EXTREMITY EDEMA: 1
MUSCLE WEAKNESS: 1

## 2024-09-06 ASSESSMENT — PAIN SCALES - PAIN ASSESSMENT IN ADVANCED DEMENTIA (PAINAD): BREATHING: 0

## 2024-09-09 ENCOUNTER — HOME CARE VISIT (OUTPATIENT)
Dept: HOME HEALTH SERVICES | Facility: HOME HEALTH | Age: 84
End: 2024-09-09
Payer: MEDICARE

## 2024-09-10 NOTE — HOME HEALTH
Arrived to patient home for scheduled visit; no response to knocking on door/ringing door bell.  Called patient, no answer.  Noticed vm had been left on my phone stating patient did not need visit today; as she was taking her  to ER.  Missed visit.

## 2024-09-12 ENCOUNTER — HOME CARE VISIT (OUTPATIENT)
Dept: HOME HEALTH SERVICES | Facility: HOME HEALTH | Age: 84
End: 2024-09-12
Payer: MEDICARE

## 2024-09-12 VITALS
SYSTOLIC BLOOD PRESSURE: 128 MMHG | OXYGEN SATURATION: 96 % | DIASTOLIC BLOOD PRESSURE: 80 MMHG | HEART RATE: 79 BPM | TEMPERATURE: 98.7 F | RESPIRATION RATE: 18 BRPM

## 2024-09-12 PROCEDURE — G0299 HHS/HOSPICE OF RN EA 15 MIN: HCPCS | Mod: HHH

## 2024-09-12 ASSESSMENT — ENCOUNTER SYMPTOMS
MUSCLE WEAKNESS: 1
PAIN SEVERITY GOAL: 0/10
APPETITE LEVEL: GOOD
LOWER EXTREMITY EDEMA: 1
LOWEST PAIN SEVERITY IN PAST 24 HOURS: 0/10
HIGHEST PAIN SEVERITY IN PAST 24 HOURS: 0/10

## 2024-09-12 ASSESSMENT — PAIN SCALES - PAIN ASSESSMENT IN ADVANCED DEMENTIA (PAINAD): BREATHING: 0

## 2024-09-17 ENCOUNTER — HOME CARE VISIT (OUTPATIENT)
Dept: HOME HEALTH SERVICES | Facility: HOME HEALTH | Age: 84
End: 2024-09-17
Payer: MEDICARE

## 2024-09-17 VITALS
OXYGEN SATURATION: 98 % | TEMPERATURE: 98.4 F | SYSTOLIC BLOOD PRESSURE: 110 MMHG | HEART RATE: 73 BPM | DIASTOLIC BLOOD PRESSURE: 70 MMHG | RESPIRATION RATE: 18 BRPM

## 2024-09-17 PROCEDURE — G0299 HHS/HOSPICE OF RN EA 15 MIN: HCPCS | Mod: HHH

## 2024-09-17 ASSESSMENT — ACTIVITIES OF DAILY LIVING (ADL)
OASIS_M1830: 00
HOME_HEALTH_OASIS: 00

## 2024-09-17 ASSESSMENT — ENCOUNTER SYMPTOMS
LOWEST PAIN SEVERITY IN PAST 24 HOURS: 0/10
PAIN SEVERITY GOAL: 0/10
HIGHEST PAIN SEVERITY IN PAST 24 HOURS: 0/10

## 2024-09-17 ASSESSMENT — PAIN SCALES - PAIN ASSESSMENT IN ADVANCED DEMENTIA (PAINAD): BREATHING: 0

## 2024-09-25 ENCOUNTER — OFFICE VISIT (OUTPATIENT)
Dept: ORTHOPEDIC SURGERY | Facility: CLINIC | Age: 84
End: 2024-09-25
Payer: MEDICARE

## 2024-09-25 VITALS — WEIGHT: 102 LBS | HEIGHT: 58 IN | BODY MASS INDEX: 21.41 KG/M2

## 2024-09-25 DIAGNOSIS — M75.101 NONTRAUMATIC ROTATOR CUFF TEAR, RIGHT: Primary | ICD-10-CM

## 2024-09-25 DIAGNOSIS — M19.011 ARTHRITIS OF RIGHT SHOULDER REGION: ICD-10-CM

## 2024-09-25 DIAGNOSIS — M25.511 CHRONIC PAIN IN RIGHT SHOULDER: ICD-10-CM

## 2024-09-25 DIAGNOSIS — G89.29 CHRONIC PAIN IN RIGHT SHOULDER: ICD-10-CM

## 2024-09-25 PROCEDURE — 20610 DRAIN/INJ JOINT/BURSA W/O US: CPT | Performed by: ORTHOPAEDIC SURGERY

## 2024-09-25 PROCEDURE — 1159F MED LIST DOCD IN RCRD: CPT | Performed by: ORTHOPAEDIC SURGERY

## 2024-09-25 PROCEDURE — 1036F TOBACCO NON-USER: CPT | Performed by: ORTHOPAEDIC SURGERY

## 2024-09-25 PROCEDURE — 1157F ADVNC CARE PLAN IN RCRD: CPT | Performed by: ORTHOPAEDIC SURGERY

## 2024-09-25 PROCEDURE — 99203 OFFICE O/P NEW LOW 30 MIN: CPT | Performed by: ORTHOPAEDIC SURGERY

## 2024-09-25 PROCEDURE — 1160F RVW MEDS BY RX/DR IN RCRD: CPT | Performed by: ORTHOPAEDIC SURGERY

## 2024-09-25 RX ORDER — LIDOCAINE HYDROCHLORIDE 10 MG/ML
2 INJECTION, SOLUTION INFILTRATION; PERINEURAL
Status: COMPLETED | OUTPATIENT
Start: 2024-09-25 | End: 2024-09-25

## 2024-09-25 RX ORDER — TRIAMCINOLONE ACETONIDE 40 MG/ML
40 INJECTION, SUSPENSION INTRA-ARTICULAR; INTRAMUSCULAR
Status: COMPLETED | OUTPATIENT
Start: 2024-09-25 | End: 2024-09-25

## 2024-09-25 NOTE — PROGRESS NOTES
"Subjective    Patient ID: Xin Fajardo \"Osiris" is a 84 y.o. female.    Chief Complaint: Pain of the Right Shoulder (Nki/+pain x 3/2024/+lrom +numbness)       This is an 84-year-old female who presents for evaluation of right shoulder pain.  This has been somewhat chronic in nature.  She did receive an injection of cortisone into the right shoulder back in March of this year from a [facility] physician which was of some limited benefit.  She has also tried physical therapy which she feels may have aggravated her condition.  She has loss of motion as well as sensation of loss of strength.  She has been told she suffers from a frozen shoulder as well.    This patient's past medical, social, and family history were reviewed as well as a review of systems including updates on the patient's information encounter sheet  Significant history of skin cancer complicated by an infection after surgery for this on her right side of her face earlier this year    Physical Examination  Constitutional: Patient's height and weight reviewed, appears well kempt, quite slender and somewhat cachectic appearing  Psychiatric: Alert and oriented ×3, appropriate mood and behavior  Pulmonary: Breathing appears nonlabored, no apparent distress  Lymphatic: No appreciable lymphadenopathy to both the upper and lower extremities  Skin: No open lesions, rashes, ulcerations  Neurologic: Gross motor and sensory exam appear intact (except for abnormalities noted in the below muscle skeletal exam)    Musculoskeletal: The right glenohumeral joint is grossly well reduced.  There is significant weakness of external rotation at 3/5, abduction at 3/5 and internal rotation strength is 4/5.  There is palpable superior migration of the humeral head in relationship to the acromion as well.  Limited passive abduction of the right shoulder to 80 degrees and forward flexion to 100 degrees    X-rays performed in August of this year were reviewed " demonstrating the glenohumeral joint is grossly well reduced.  Arthritic changes noted at the acromioclavicular joint.    Assessment: Chronic rotator cuff tear right shoulder    Plan: Extended discussion ensued with the patient guarding treatment options.  There is little I could offer her other than an injection today.  We did discuss surgically based on her symptoms and her physical findings she would be a candidate for reverse shoulder arthroplasty as long as she can be medically cleared for this condition.  The present time she does not wish to consider surgical intervention.  She desired to proceed with the right shoulder intra-articular Kenalog injection performed today and tolerated well.  Follow-up..    Right Shoulder     L Inj/Asp: R glenohumeral (Right) on 9/25/2024 12:59 PM  Indications: pain  Details: 22 G needle, anterior approach  Medications: 40 mg triamcinolone acetonide 40 mg/mL; 2 mL lidocaine 10 mg/mL (1 %)  Outcome: tolerated well, no immediate complications  Patient was prepped and draped in the usual sterile fashion.               Current Outpatient Medications:     carvedilol (Coreg) 6.25 mg tablet, Take 1 tablet (6.25 mg) by mouth every 12 hours., Disp: 180 tablet, Rfl: 1    cholecalciferol (Vitamin D-3) 50 mcg (2,000 unit) capsule, Take 1 capsule (50 mcg) by mouth once daily., Disp: , Rfl:     denosumab (Prolia) 60 mg/mL syringe, Inject 1 mL (60 mg) under the skin every 6 months., Disp: , Rfl:     famotidine (Pepcid) 20 mg tablet, Take 1 tablet (20 mg) by mouth once daily. Do not fill before August 7, 2024., Disp: 30 tablet, Rfl: 2    losartan (Cozaar) 50 mg tablet, Take 1 tablet (50 mg) by mouth once daily., Disp: 90 tablet, Rfl: 1    mirtazapine (Remeron) 15 mg tablet, Take 1 tablet (15 mg) by mouth once daily at bedtime., Disp: 30 tablet, Rfl: 0    multivitamin-min-iron-FA-vit K (Bariatric Multivitamins) 45 mg iron- 800 mcg-120 mcg capsule, Take 1 capsule by mouth once daily., Disp: ,  Rfl:     sodium bicarbonate 650 mg tablet, Take 2 tablets twice daily, Disp: 120 tablet, Rfl: 1    thyroid, pork, ([place] Thyroid) 90 mg tablet, Take 1 tablet (90 mg) by mouth once daily in the morning. Take before meals., Disp: , Rfl:     torsemide (Demadex) 20 mg tablet, Take 1 tablet (20 mg) by mouth once daily., Disp: 90 tablet, Rfl: 1

## 2024-10-04 ENCOUNTER — OFFICE VISIT (OUTPATIENT)
Dept: ORTHOPEDIC SURGERY | Facility: HOSPITAL | Age: 84
End: 2024-10-04
Payer: MEDICARE

## 2024-10-04 DIAGNOSIS — G89.29 CHRONIC PAIN IN RIGHT SHOULDER: Primary | ICD-10-CM

## 2024-10-04 DIAGNOSIS — M25.511 CHRONIC PAIN IN RIGHT SHOULDER: Primary | ICD-10-CM

## 2024-10-04 PROCEDURE — 1159F MED LIST DOCD IN RCRD: CPT | Performed by: ORTHOPAEDIC SURGERY

## 2024-10-04 PROCEDURE — 99213 OFFICE O/P EST LOW 20 MIN: CPT | Performed by: ORTHOPAEDIC SURGERY

## 2024-10-04 PROCEDURE — 1157F ADVNC CARE PLAN IN RCRD: CPT | Performed by: ORTHOPAEDIC SURGERY

## 2024-10-04 NOTE — PROGRESS NOTES
Subjective   Patient ID: Xin Fajardo is a 84 y.o. female    Chief Complaint:   Chief Complaint   Patient presents with    Right Shoulder - Pain        Last Surgery: No surgery found  Date of Last Surgery: No surgery found    HPI  Xin Fajardo is a 84 y.o. right hand dominant female presenting for right shoulder pain.     She has had right shoulder pain for about a year. She takes NSAIDs as needed and has had two cortisone injections. The first done March 2024 worked well for her. Then she had another done 10 days ago that has not helped at all. She explains that they did move the needle around and it was painful. This wakes her up at night occasionally.     Her most recent hospitalization   She was in metabolic acidosis.       Objective   Patient is a well-developed, well-nourished female  in no acute distress.  Breathes with normal chest rises.  Pupils are round and symmetric today.  Awake, alert, and oriented x3.      Examination of the left shoulder today reveals the skin to be intact. There is no sign of any atrophy, lesions, or abrasions. There is no pain to palpation of the bony prominences. Cervical lymphadenopathy examined, and this was negative. Patient had 5 out of 5 wrist flexion, extension, and thumb extension bilaterally. Sensation was intact to light touch to median, ulnar, radial axillary, and musculocutaneous nerves bilaterally. Positive radial pulse bilaterally. Provocative maneuvers on the left side today were negative.  Range of motion of the left shoulder revealed 0-100° of forward elevation, 0-50° of external rotation, and internal rotation was to her buttocks.    Examination of the right shoulder today reveals the skin to be intact. There is no sign of any atrophy, lesions, or abrasions. There is no pain to palpation of the bony prominences. Cervical lymphadenopathy examined, and this was negative. Patient had 5 out of 5 wrist flexion, extension, and thumb extension, bilaterally. Sensation  was intact to light touch to median, ulnar, radial, axillary, and musculocutaneous nerves bilaterally. Positive radial pulse bilaterally. Provocative maneuvers on the right side today were negative.  Range of motion of the right shoulder revealed 0-10° of active forward elevation, passively correctable to 90 ° with pain and significant crepitus. 0-40° of external rotation, and internal rotation was to her buttocks. 4/5 RER    Imaging:    X-rays of the right shoulder taken today personally ordered and interpreted by me show glenohumeral arthritis, superior migration. Retroversion. Evidence of cardiac clips    Assessment/Plan   No diagnosis found.  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       1. Do nothing and continue activity modifications.     2. Consider cortisone injections into the glenohumeral joint. This would give pain relief that is temporary. This would not stop the progression of arthritis. For some patients, this injection can provide no relief at all, relief for 2 weeks, 4 weeks, 3 months or longer. The risk of the injection is fairly minimal but does include a very small chance of infection.     3. Another option is a total shoulder replacement. This will give the patient a more permanent solution to pain relief. It would also improve function. There is no rush to this procedure; it is an elective procedure.      Xin has severe arthritis in their right shoulder. They have failed conservative management for over 6 months with injections, anti inflammatories, therapy and home exercises. They cannot continue living with their shoulders secondary to pain and disability. They have severe erosive, destructive changes within their shoulder joint limitation of motion that do not allow them to get above their shoulder level or reach behind their back. Because of the erosive and  destructive changes in their shoulder joint as seen on CT scan and plain films, in addition to their severe limitation of range of motion we have recommended a reverse shoulder replacement which will help with their pain as well as improve their function.    The risks, benefits and alternatives of a shoulder replacement were discussed at length. The risks of surgery are infection, dislocation, nerve injury, blood loss. The benefits are pain relief and restoration of function. The patient verbalized an understanding of the risks, benefits, alternatives, and the expected outcomes. We gave the patient a handout today on reverse shoulder replacement. They understand that this is a salvage procedure getting a 70-75% of the normal shoulder. The primary reason to perform this procedure is for pain. Function is a secondary gain. The risks of surgery are infection, dislocation, nerve injury, blood loss. The benefits are pain relief and restoration of function. The patient verbalize an understanding of the risks benefits alternatives and the expected outcomes and wishes to proceed with elective surgery.     The rehabilitation after surgery was discussed at length. It would be approximately 2-4 weeks in a sling with activities as tolerated at the waist level. Following this they will start stretching and range of motion exercises. We anticipate they will make a near full recovery around 3 months, but with continued improvement up to a year after surgery. We also discussed the hospital course. Usually patients stay one night but sometimes they are able to leave same day. We will plan on patient leaving same day as they have good family support for immediate post operative care following surgery to assist at home.     Preoperative CT scan will need to be completed for pre-operative planning with [facility] protocol prior to surgery date to be sure that we did not miss anything from a structural standpoint. They are aware that  they need to be medically cleared by the surgical anesthesia team prior to surgery. Patient will be scheduled for their pre-operative visit with Arianna Dsouza. They will be fitted with their post operative sling at this time. All questions were answered to the patient's satisfaction and they are comfortable with the above plan.      We had a long discussion regarding her diagnosis. We talked about her treatment options.   As she has had an injection 10 days ago, she is still 3 months out from surgery. She has had formal therapy that has not helped at all. She would like to proceed with surgery. We discussed this at length, she was provided a hand out and we modeled how the biomechanics of a reverse replacement works. In the meantime, she may continue activities as tolerated as she cannot damage this further with use. We will see her back in 3 months for a repeat clinical exam.   No orders of the defined types were placed in this encounter.        Follow up in 3 months     Scribe Attestation  By signing my name below, IElisabet Scribe   attest that this documentation has been prepared under the direction and in the presence of Pj Arora MD.

## 2024-10-16 ENCOUNTER — PATIENT OUTREACH (OUTPATIENT)
Dept: PRIMARY CARE | Facility: CLINIC | Age: 84
End: 2024-10-16
Payer: MEDICARE

## 2024-11-04 ENCOUNTER — LAB (OUTPATIENT)
Dept: LAB | Facility: LAB | Age: 84
End: 2024-11-04
Payer: MEDICARE

## 2024-11-04 DIAGNOSIS — E87.20 METABOLIC ACIDOSIS: ICD-10-CM

## 2024-11-04 DIAGNOSIS — D63.8 ANEMIA, CHRONIC DISEASE: ICD-10-CM

## 2024-11-04 DIAGNOSIS — N18.32 STAGE 3B CHRONIC KIDNEY DISEASE (MULTI): ICD-10-CM

## 2024-11-04 DIAGNOSIS — I50.32 CHRONIC DIASTOLIC HEART FAILURE: ICD-10-CM

## 2024-11-04 PROCEDURE — 83540 ASSAY OF IRON: CPT

## 2024-11-04 PROCEDURE — 84100 ASSAY OF PHOSPHORUS: CPT

## 2024-11-04 PROCEDURE — 83550 IRON BINDING TEST: CPT

## 2024-11-04 PROCEDURE — 85025 COMPLETE CBC W/AUTO DIFF WBC: CPT

## 2024-11-04 PROCEDURE — 80053 COMPREHEN METABOLIC PANEL: CPT

## 2024-11-04 PROCEDURE — 36415 COLL VENOUS BLD VENIPUNCTURE: CPT

## 2024-11-04 PROCEDURE — 83735 ASSAY OF MAGNESIUM: CPT

## 2024-11-05 LAB
ALBUMIN SERPL BCP-MCNC: 3.2 G/DL (ref 3.4–5)
ALP SERPL-CCNC: 52 U/L (ref 33–136)
ALT SERPL W P-5'-P-CCNC: 10 U/L (ref 7–45)
ANION GAP SERPL CALC-SCNC: 15 MMOL/L (ref 10–20)
AST SERPL W P-5'-P-CCNC: 16 U/L (ref 9–39)
BASOPHILS # BLD AUTO: 0.05 X10*3/UL (ref 0–0.1)
BASOPHILS NFR BLD AUTO: 0.7 %
BILIRUB SERPL-MCNC: 0.3 MG/DL (ref 0–1.2)
BUN SERPL-MCNC: 37 MG/DL (ref 6–23)
CALCIUM SERPL-MCNC: 9 MG/DL (ref 8.6–10.6)
CHLORIDE SERPL-SCNC: 111 MMOL/L (ref 98–107)
CO2 SERPL-SCNC: 27 MMOL/L (ref 21–32)
CREAT SERPL-MCNC: 1.49 MG/DL (ref 0.5–1.05)
EGFRCR SERPLBLD CKD-EPI 2021: 34 ML/MIN/1.73M*2
EOSINOPHIL # BLD AUTO: 0.18 X10*3/UL (ref 0–0.4)
EOSINOPHIL NFR BLD AUTO: 2.5 %
ERYTHROCYTE [DISTWIDTH] IN BLOOD BY AUTOMATED COUNT: 13.2 % (ref 11.5–14.5)
GLUCOSE SERPL-MCNC: 93 MG/DL (ref 74–99)
HCT VFR BLD AUTO: 28.7 % (ref 36–46)
HGB BLD-MCNC: 9 G/DL (ref 12–16)
IMM GRANULOCYTES # BLD AUTO: 0.02 X10*3/UL (ref 0–0.5)
IMM GRANULOCYTES NFR BLD AUTO: 0.3 % (ref 0–0.9)
IRON SATN MFR SERPL: 26 % (ref 25–45)
IRON SERPL-MCNC: 72 UG/DL (ref 35–150)
LYMPHOCYTES # BLD AUTO: 1.93 X10*3/UL (ref 0.8–3)
LYMPHOCYTES NFR BLD AUTO: 26.7 %
MAGNESIUM SERPL-MCNC: 1.29 MG/DL (ref 1.6–2.4)
MCH RBC QN AUTO: 31.8 PG (ref 26–34)
MCHC RBC AUTO-ENTMCNC: 31.4 G/DL (ref 32–36)
MCV RBC AUTO: 101 FL (ref 80–100)
MONOCYTES # BLD AUTO: 0.71 X10*3/UL (ref 0.05–0.8)
MONOCYTES NFR BLD AUTO: 9.8 %
NEUTROPHILS # BLD AUTO: 4.34 X10*3/UL (ref 1.6–5.5)
NEUTROPHILS NFR BLD AUTO: 60 %
NRBC BLD-RTO: 0 /100 WBCS (ref 0–0)
PHOSPHATE SERPL-MCNC: 5.3 MG/DL (ref 2.5–4.9)
PLATELET # BLD AUTO: 282 X10*3/UL (ref 150–450)
POTASSIUM SERPL-SCNC: 3.7 MMOL/L (ref 3.5–5.3)
PROT SERPL-MCNC: 5.5 G/DL (ref 6.4–8.2)
RBC # BLD AUTO: 2.83 X10*6/UL (ref 4–5.2)
SODIUM SERPL-SCNC: 149 MMOL/L (ref 136–145)
TIBC SERPL-MCNC: 280 UG/DL (ref 240–445)
UIBC SERPL-MCNC: 208 UG/DL (ref 110–370)
WBC # BLD AUTO: 7.2 X10*3/UL (ref 4.4–11.3)

## 2024-11-06 ENCOUNTER — PATIENT OUTREACH (OUTPATIENT)
Dept: PRIMARY CARE | Facility: CLINIC | Age: 84
End: 2024-11-06
Payer: MEDICARE

## 2024-11-07 ENCOUNTER — APPOINTMENT (OUTPATIENT)
Dept: PRIMARY CARE | Facility: CLINIC | Age: 84
End: 2024-11-07
Payer: MEDICARE

## 2024-11-07 VITALS
WEIGHT: 104.8 LBS | BODY MASS INDEX: 21.9 KG/M2 | OXYGEN SATURATION: 98 % | DIASTOLIC BLOOD PRESSURE: 76 MMHG | HEART RATE: 65 BPM | SYSTOLIC BLOOD PRESSURE: 142 MMHG | TEMPERATURE: 97.5 F

## 2024-11-07 DIAGNOSIS — D63.8 ANEMIA, CHRONIC DISEASE: Primary | ICD-10-CM

## 2024-11-07 DIAGNOSIS — N18.32 CHRONIC KIDNEY DISEASE (CKD) STAGE G3B/A2, MODERATELY DECREASED GLOMERULAR FILTRATION RATE (GFR) BETWEEN 30-44 ML/MIN/1.73 SQUARE METER AND ALBUMINURIA CREATININE RATIO BETWEEN 30-299 MG/G (C* (MULTI): ICD-10-CM

## 2024-11-07 DIAGNOSIS — G89.29 CHRONIC RIGHT SHOULDER PAIN: ICD-10-CM

## 2024-11-07 DIAGNOSIS — I10 ESSENTIAL HYPERTENSION: ICD-10-CM

## 2024-11-07 DIAGNOSIS — M25.511 CHRONIC RIGHT SHOULDER PAIN: ICD-10-CM

## 2024-11-07 PROCEDURE — 99215 OFFICE O/P EST HI 40 MIN: CPT | Performed by: FAMILY MEDICINE

## 2024-11-07 PROCEDURE — 3078F DIAST BP <80 MM HG: CPT | Performed by: FAMILY MEDICINE

## 2024-11-07 PROCEDURE — 1159F MED LIST DOCD IN RCRD: CPT | Performed by: FAMILY MEDICINE

## 2024-11-07 PROCEDURE — 1036F TOBACCO NON-USER: CPT | Performed by: FAMILY MEDICINE

## 2024-11-07 PROCEDURE — 1160F RVW MEDS BY RX/DR IN RCRD: CPT | Performed by: FAMILY MEDICINE

## 2024-11-07 PROCEDURE — 1123F ACP DISCUSS/DSCN MKR DOCD: CPT | Performed by: FAMILY MEDICINE

## 2024-11-07 PROCEDURE — 3077F SYST BP >= 140 MM HG: CPT | Performed by: FAMILY MEDICINE

## 2024-11-07 PROCEDURE — 1157F ADVNC CARE PLAN IN RCRD: CPT | Performed by: FAMILY MEDICINE

## 2024-11-07 RX ORDER — DEXTROMETHORPHAN HYDROBROMIDE, GUAIFENESIN 5; 100 MG/5ML; MG/5ML
650 LIQUID ORAL EVERY 8 HOURS PRN
COMMUNITY

## 2024-11-07 NOTE — PROGRESS NOTES
Subjective   Patient ID: Prisca Fajardo is a 84 y.o. female who presents for Shoulder Pain (Prisca is here to discuss having a shoulder replacement surgery. She has already had her flu shot for the season. ).    HPI   Mrs. Fajardo was seen today, daughter Tigist present, for a follow-up and review of her medications and recent labs.  Since her last visit with me, her multiple medical problems have stabilized.  The biggest ongoing, recent issue has been that of her right shoulder, rotator cuff arthropathy and pseudo frozen shoulder.  She has seen Dr. Arora from orthopedics, who reviewed several options, including conservative treatment in the form of home therapy, cortisone injections versus a reverse total shoulder replacement.  She and her family have discussed these options, would like to review today.  Her medical issues from the summer have largely resolved or stabilized, including metabolic acidosis, acute on chronic kidney disease, anemia, facial cellulitis status post Mohs.    Medication(s) are being taken and tolerated as prescribed, without concerns, list reconciled today.  She has been off of medazepam for a couple of months or so.  Labs from November 4 were reviewed, sodium was just a bit high at 149, chloride 111.  Bicarb normal at 27 (had been in the 18 range previously).  Creatinine is 1.49, GFR 34, both are relatively stable.  Albumin improved from 3.0-3.2.  She has been eating and drinking well.  Magnesium was just a bit low at 1.29.  Lastly, hemoglobin was 9, up from 8.26 weeks ago.    Specialty appointments are pending, including cardiology and nephrology.  Mrs. Fajardo has been taking Advil as needed, more days than not.  She has difficulty sleeping, has frequent awakenings because of the pain.  She is functionally limited with multiple ADLs, though she has adapted somewhat with her left upper extremity.    Review of Systems  The full review of systems is negative with the exception of what is noted  in HPI    Objective   /76 (BP Location: Left arm, Patient Position: Sitting, BP Cuff Size: Adult)   Pulse 65   Temp 36.4 °C (97.5 °F) (Temporal)   Wt 47.5 kg (104 lb 12.8 oz)   SpO2 98%   BMI 21.90 kg/m²     Physical Exam  Cardiac exam reveals a regular rate and rhythm,  murmur present.   Lungs are clear bilaterally.    No lower extremity edema present.  Right shoulder exam reveals pain with abduction, overhead or crossover movements.  Range of motion is markedly limited.  Biceps, triceps,  strength, flexion and volar flexion movements are all normal.  Radial pulses normal.  Assessment/Plan     Today we had an extensive discussion regarding her chronic, functionally limiting right shoulder pain, due to rotator cuff tear and pseudoparalysis of the glenohumeral joint.  Dr. Garcia reviewed 3 options, including no treatment other than conservative care, PT, etc., versus cortisone injections versus a reverse total shoulder arthroplasty.  She is currently taking Advil for pain, which needs to be discontinued because of chronic kidney disease.  From an over-the-counter standpoint, Tylenol is really her only option, I recommend trying 650 mg 2 times daily.  LFTs have always been normal.  If this, ice, physical therapy and home stretches improve the pain sufficiently, no further intervention may be needed.  Is hard to predict how much a glenohumeral injection would help, my suspicion is that it would be minimal or short-term.  Shoulder replacement surgery and recovery would be significantly limiting for at least 3 to 4 weeks.  She would likely need and benefit from a skilled nursing rehab stay.  She concurs with this.  We would need cardiology and nephrology to medically clear her.  She would benefit from preoperative blood transfusion, to increase her hemoglobin to a range closer to 10.  Will defer to nephrology whether this or erythropoietin would be helpful.    She will ponder our conversation, try the  Tylenol, continue ice and therapy.  We can review further if needed.     Manesium oxide 200-250 mg daily with food (OTC) for low magnesium    Continue all other medications    Total time spent with patient, reviewing records, and completing charting was 61 minutes, over half of it spent counseling and/or coordinating care  **Portions of this medical record have been created using voice recognition software and may have minor errors which are inherent in voice recognition systems. It has not been fully edited for typographical or grammatical errors**

## 2024-11-25 DIAGNOSIS — E87.8 LOW BICARBONATE LEVEL: ICD-10-CM

## 2024-11-25 DIAGNOSIS — E87.20 METABOLIC ACIDOSIS: ICD-10-CM

## 2024-11-25 RX ORDER — SODIUM BICARBONATE 650 MG/1
TABLET ORAL
Qty: 120 TABLET | Refills: 3 | Status: SHIPPED | OUTPATIENT
Start: 2024-11-25

## 2024-11-26 NOTE — PROGRESS NOTES
" Cardiology Established Outpatient Visit    Reason for visit: chronic diastolic heart failure; mitral regurgitation     HPI: Xin Fajardo \"Osiris" is a 83 y.o.  female who presents today for chronic diastolic heart failure; mitral regurgitation . Past medical history of chronic diastolic heart failure, mitral regurgitation (Moderate per TTE ), primary hypertension, dyslipidemia, hypothyroidism, Stage IIIb CKD, osteoporosis, hx colon + lung cancer, hx thyroid cancer.     Xin presented to cardiology clinic on 2024.  Patient notes worsening dyspnea over the past year.  No recent hospitalizations for acute on chronic heart failure. History of 5-FU for colon cancer in remote past.  Patient with a history of moderate MR per transthoracic echocardiogram . Recommended further evaluation with repeat TTE and checking BNP.     Xin presented to cardiology clinic on 2024 for a follow-up visit.  Dyspnea unchanged. Dyspnea at rest and worse with exertion.  BNP was un-elevated. TTE showed normal LV size and systolic function; trace to mild MR.  Given exertional dyspnea (anginal equivalent), HTN, DLD, and family history of ASHD (father-  of MI age 56) > recommend further evaluation with NM stress.      Prisca presented to cardiology clinic on 2024.  No new symptoms at this time.  Nuclear medicine stress test was low risk for ischemia.  Per patient home blood pressure is well-controlled.  Will continue carvedilol and losartan.    Past Medical History:   - As above    Surgical History:   She has a past surgical history that includes Other surgical history (2021); Other surgical history (2021); Other surgical history (2021); Other surgical history (2021); US guided percutaneous peritoneal or retroperitoneal fluid collection drainage (2013); and US guided abscess drain (2013).    Family History:   Family History   Problem Relation Name Age of Onset    " Answered questions on CMM and submitted back to the plan.   Heart disease Mother      Heart attack Father  56         from MI    Coronary artery disease Father      Breast cancer Sister         Allergies:  Amlodipine     Social History:   - Non smoker (quit in remote past- quit ); no illicit drug use    Prior Cardiovascular Testing (personally reviewed):     NM stress (2024)  1. Normal stress myocardial perfusion imaging in response to  pharmacologic stress.  2. Well-maintained left ventricular function.  77%    ECG 2024)- Normal sinus rhythm; normal ECG     TTE (2024)  1. Left ventricular systolic function is normal with a 65-70% estimated ejection fraction.   2. Spectral Doppler shows an impaired relaxation pattern of left ventricular diastolic filling.   3. RVSP within normal limits.   4. Trace to mild mitral regurgitation    TTE ()- moderate MR     Review of Systems:  Review of Systems   Constitutional: Negative.   HENT: Negative.     Eyes: Negative.    Cardiovascular:  Positive for dyspnea on exertion. Negative for chest pain, near-syncope, orthopnea, palpitations, paroxysmal nocturnal dyspnea and syncope.   Respiratory:  Positive for shortness of breath.    Endocrine: Negative.    Hematologic/Lymphatic: Negative.    Skin: Negative.    Musculoskeletal: Negative.    Gastrointestinal: Negative.    Genitourinary: Negative.    Neurological: Negative.    Psychiatric/Behavioral: Negative.     Allergic/Immunologic: Negative.        Objective     Outpatient Medications:    Current Outpatient Medications:     carvedilol (Coreg) 6.25 mg tablet, Take 1 tablet (6.25 mg) by mouth every 12 hours., Disp: , Rfl:     cholecalciferol (Vitamin D-3) 50 mcg (2,000 unit) capsule, Take 1 capsule (50 mcg) by mouth once daily., Disp: , Rfl:     denosumab (Prolia) 60 mg/mL syringe, Inject 1 mL (60 mg) under the skin every 6 months., Disp: , Rfl:     famotidine (Pepcid) 20 mg tablet, Take 1 tablet (20 mg) by mouth once daily. Do not fill before 2024., Disp: 30  tablet, Rfl: 2    losartan (Cozaar) 50 mg tablet, Take 1 tablet (50 mg) by mouth once daily., Disp: , Rfl:     mirtazapine (Remeron) 15 mg tablet, Take 1 tablet (15 mg) by mouth once daily at bedtime., Disp: 30 tablet, Rfl: 0    multivitamin-min-iron-FA-vit K (Bariatric Multivitamins) 45 mg iron- 800 mcg-120 mcg capsule, Take 1 capsule by mouth once daily., Disp: , Rfl:     sodium bicarbonate 650 mg tablet, Take 2 tablets twice daily, Disp: 120 tablet, Rfl: 1    thyroid, pork, (Raymond Thyroid) 90 mg tablet, Take 1 tablet (90 mg) by mouth once daily in the morning. Take before meals., Disp: , Rfl:      Last Recorded Vitals  /62 (BP Location: Left arm, Patient Position: Sitting)   Pulse 73   Resp 16   Wt 50.8 kg (112 lb)   SpO2 97%   BMI 23.41 kg/m²     Physical Exam:  Physical Exam  Constitutional:       General: She is not in acute distress.  HENT:      Head: Normocephalic.      Mouth/Throat:      Mouth: Mucous membranes are moist.   Eyes:      Extraocular Movements: Extraocular movements intact.      Conjunctiva/sclera: Conjunctivae normal.   Neck:      Vascular: No carotid bruit or JVD.   Cardiovascular:      Rate and Rhythm: Normal rate and regular rhythm.      Pulses: Normal pulses.      Heart sounds: No murmur heard.  Pulmonary:      Effort: Pulmonary effort is normal. No respiratory distress.      Breath sounds: Normal breath sounds.   Abdominal:      General: Bowel sounds are normal. There is no distension.      Palpations: Abdomen is soft.   Musculoskeletal:         General: No swelling.      Right lower le+ Pitting Edema present.      Left lower le+ Pitting Edema present.   Skin:     General: Skin is warm and dry.   Neurological:      General: No focal deficit present.      Mental Status: She is alert.      Cranial Nerves: No cranial nerve deficit.      Motor: No weakness.   Psychiatric:         Mood and Affect: Mood normal.         Behavior: Behavior normal.         Lab Review:    Lab  "Results   Component Value Date    GLUCOSE 95 2024    CALCIUM 8.5 (L) 2024     (H) 2024    K 4.7 2024    CO2 17 (L) 2024     (H) 2024    BUN 65 (H) 2024    CREATININE 1.94 (H) 2024       Lab Results   Component Value Date    BNP 72 2024       Assessment:   83 y.o.  female who presents today for chronic diastolic heart failure; mitral regurgitation . Past medical history of chronic diastolic heart failure, mitral regurgitation (Moderate per TTE ), primary hypertension, dyslipidemia, hypothyroidism, Stage IIIb CKD, osteoporosis, hx colon + lung cancer, hx thyroid cancer.     Xin presented to cardiology clinic on 2024 for a follow-up visit.  Dyspnea unchanged. Dyspnea at rest and worse with exertion.  BNP was un-elevated. TTE showed normal LV size and systolic function; trace to mild MR.  Given exertional dyspnea (anginal equivalent), HTN, DLD, and family history of ASHD (father-  of MI age 56) > recommend further evaluation with NM stress and check a CBC to assess for anemia (non-cardiac cause for dyspnea).  For lower extremity edema we will switch from p.o. furosemide to torsemide 20 mg daily (better bioavailability)    Overall Plan:  1. Chronic dyspnea; worse with exertion; cardiac risk factors as above  -Nuclear medicine stress test is low for ischemia  -Trial of diuretic therapy with torsemide    2. Hx mitral regurgitation  - trace to mild on recent TTE > unlikely to explain patient's dyspnea  - monitor clinically    3. Primary hypertension  - goal BP < 130/90 mmHg  - continue losartan, carvedilol    4. Stage IIIb CKD  - avoid NSAIDs  - renally dose medications    5.  Lower extremity edema  - Switch from furosemide to torsemide 20 mg daily    6. Discussed \"red flag\" symptoms that should prompt immediate medical attention; patient verbalized understanding    Disposition: Return to cardiology clinic in 3 months    Idris CHANCE" MD Adriana

## 2024-12-03 ENCOUNTER — APPOINTMENT (OUTPATIENT)
Dept: ORTHOPEDIC SURGERY | Facility: HOSPITAL | Age: 84
End: 2024-12-03
Payer: MEDICARE

## 2024-12-10 NOTE — PROGRESS NOTES
"Nephrology Outpatient Follow-up Patient Note    Chief Concern:  Follow-up for CKD    History Of Present Illness   Xin Fajardo \"Osiris" is a 84 y.o. female with a history of  CKD IIIb, Distant malignancies, HFpEF, MR, HTN, Hypothyroid. New skin cancer -BCC-, she underwent a Mohs procedure/surgery of the right cheek on 7/23 then was readmitted on 8/3 for infection of the wound.   - Seen during hospital admission on 8/2024 by Dr De Oliveira because of her acidosis, that is chronic  New to us in OP setting   - Baseline Scr ~1.3-1.4 since at least 2020 that I can see   Last labs on 11/2024 with Scr stable at baseline, 1.49, eGFR 27 w/BSA, Na 149, bicarb 27, Hb 9, TSAt 29  UA on 8/2024 with +wbc and rbc, no culture seen, neg protein on dip,  last year  - on bicarbonate, torsemide, losartan, carvedilol  - she seems to need a R shoulder replacement for pain, takes ibuprofen almost every day     Past Medical History  She has a past medical history of Essential (primary) hypertension, Personal history of other diseases of the circulatory system, Personal history of other diseases of the circulatory system, Personal history of other endocrine, nutritional and metabolic disease, Personal history of other malignant neoplasm of bronchus and lung, and Personal history of other malignant neoplasm of large intestine.  Patient Active Problem List   Diagnosis    Chronic kidney disease (CKD) stage G3b/A2, moderately decreased glomerular filtration rate (GFR) between 30-44 mL/min/1.73 square meter and albuminuria creatinine ratio between  mg/g (C* (Multi)    Essential hypertension    Chronic low back pain    History of malignant neoplasm of colon    History of malignant neoplasm of thoracic cavity structure    Lumbar radiculopathy    Mixed hyperlipidemia    Multinodular thyroid    Nonrheumatic mitral valve regurgitation    Osteoporosis    Post-surgical hypothyroidism    Sensory hearing loss, bilateral    Urge incontinence    " "Chronic diastolic heart failure    Shortness of breath    Failure to thrive in adult    Other constipation    Hypothyroidism due to Hashimoto's thyroiditis    Metabolic acidosis    Acute kidney injury superimposed on chronic kidney disease (CMS-HCC)    Severe protein-calorie malnutrition (Multi)    Symptomatic anemia       Surgical History  She has a past surgical history that includes Other surgical history (2021); Other surgical history (2021); Other surgical history (2021); Other surgical history (2021); US guided percutaneous peritoneal or retroperitoneal fluid collection drainage (2013); and US guided abscess drain (2013).     Social History  She reports that she quit smoking about 49 years ago. Her smoking use included cigarettes. She started smoking about 59 years ago. She has a 5 pack-year smoking history. She has never been exposed to tobacco smoke. She has never used smokeless tobacco. She reports current alcohol use of about 1.0 - 2.0 standard drink of alcohol per week. She reports that she does not use drugs.    Family History  Family History   Problem Relation Name Age of Onset    Heart disease Mother      Heart attack Father  56         from MI    Coronary artery disease Father      Breast cancer Sister          Allergies  Amlodipine    Review of Systems  A 12-point review of systems was performed and noted be negative except for that which was mentioned in the history of present illness     Last Recorded Vitals  /60   Pulse 75   Temp 36.1 °C (97 °F) (Temporal)   Ht 1.499 m (4' 11\")   Wt 46.3 kg (102 lb)   BMI 20.60 kg/m²      Physical Exam:  Constitutional:  No acute distress  Respiration:  Breathing comfortably, no stridor  Cardiovascular:  No clubbing/cyanosis/edema in hands  Eyes:  EOM intact, sclera normal  Neuro:  Alert and oriented times 3, Cranial nerves II-XII grossly intact and symmetric bilaterally. No asterixis  Head and Face:  Symmetric " facial features, no masses or lesions, sinuses non-tender to palpation  Neck/Lymph:  No LAD, no thyroid masses, trachea midline, No JVD  Skin:  no visible rash or scratching marks   Psych:  Alert and oriented with appropriate mood and affect      Medications:  Medication Documentation Review Audit       Reviewed by Bucky Arrington MD (Physician) on 24 at 1400      Medication Order Taking? Sig Documenting Provider Last Dose Status   acetaminophen (Tylenol 8 HOUR) 650 mg ER tablet [id number]  Take 1 tablet (650 mg) by mouth every 8 hours if needed (for shoulder and back pain). Historical Provider, MD  Active   carvedilol (Coreg) 6.25 mg tablet [id number] Yes Take 1 tablet (6.25 mg) by mouth every 12 hours. Idris Wright MD  Active   cholecalciferol (Vitamin D-3) 50 mcg (2,000 unit) capsule [id number] Yes Take 1 capsule (50 mcg) by mouth once daily. Historical Provider, MD  Active   denosumab (Prolia) 60 mg/mL syringe [id number] Yes Inject 1 mL (60 mg) under the skin every 6 months. Historical Provider, MD  Active   famotidine (Pepcid) 20 mg tablet [id number]  Take 1 tablet (20 mg) by mouth once daily. Do not fill before 2024. Rosie Stoddard MD   24 2359   losartan (Cozaar) 50 mg tablet [id number] Yes Take 1 tablet (50 mg) by mouth once daily. Idris Wright MD  Active     Discontinued 24 1011   multivitamin-min-iron-FA-vit K (Bariatric Multivitamins) 45 mg iron- 800 mcg-120 mcg capsule [id number] Yes Take 1 capsule by mouth once daily. Historical Provider, MD  Active   sodium bicarbonate 650 mg tablet [id number] Yes Take 2 tablets twice daily Bucky Arrington MD  Active   thyroid, pork, ([place] Thyroid) 90 mg tablet [id number] Yes Take 1 tablet (90 mg) by mouth once daily in the morning. Take before meals. Historical Provider, MD  Active   torsemide (Demadex) 20 mg tablet [id number] Yes Take 1 tablet (20 mg) by mouth once daily. Idris Wright MD  Active                     Relevant Results Recent labs reviewed in the EMR.  Lab Results   Component Value Date    HGB 9.0 (L) 11/04/2024    HGB 8.2 (L) 08/30/2024    HGB 8.4 (L) 08/21/2024     (H) 11/04/2024     (H) 08/30/2024     (H) 08/21/2024     11/04/2024     08/30/2024     08/21/2024       Lab Results   Component Value Date    FERRITIN 292 (H) 08/02/2024    IRON 72 11/04/2024       Lab Results   Component Value Date     (H) 11/04/2024    K 3.7 11/04/2024     (H) 11/04/2024    BUN 37 (H) 11/04/2024    CREATININE 1.49 (H) 11/04/2024       Imaging:  I personally reviewed then and this is my impression:        Assessment/Plan   1. Anemia, chronic disease  - Referral to Nephrology    2. Low bicarbonate level  - Referral to Nephrology    3. Stage 3b chronic kidney disease (Multi) (Primary)  - Stable CKD for at least the last 4 years, mildly albuminuric, on losartan, before at 100 mg every day till last hospital admission, told to increase to 100 mg every day again. Will get new labs urine studies before next visit, if ACR >200 will consider SGLT2i. Told to stop taking NSAID, Ok to use tylenol  - Improved acidosis, keep taking bicarbonate  - Anemia: slowly better, acceptable iron studies, will follow   - Referral to Nephrology  - losartan (Cozaar) 100 mg tablet; Take 1 tablet (100 mg) by mouth once daily.  Dispense: 30 tablet; Refill: 11  - PTH, intact; Future  - Vitamin D 25-Hydroxy,Total (for eval of Vitamin D levels); Future  - Renal function panel; Future  - CBC; Future  - Urinalysis with Reflex Culture and Microscopic; Future  - Albumin-Creatinine Ratio, Urine Random; Future  - Follow Up In Nephrology; Future    4. Essential hypertension  - acceptable control  - losartan (Cozaar) 100 mg tablet; Take 1 tablet (100 mg) by mouth once daily.  Dispense: 30 tablet; Refill: 11    5. Pyuria  - new UA  - Urinalysis with Reflex Culture and Microscopic; Future     - RTO 4 months   Kenny  MD Jim  Nephrology and Hypertension

## 2024-12-11 ENCOUNTER — APPOINTMENT (OUTPATIENT)
Dept: NEPHROLOGY | Facility: CLINIC | Age: 84
End: 2024-12-11
Payer: MEDICARE

## 2024-12-11 VITALS
SYSTOLIC BLOOD PRESSURE: 140 MMHG | HEIGHT: 59 IN | TEMPERATURE: 97 F | BODY MASS INDEX: 20.56 KG/M2 | DIASTOLIC BLOOD PRESSURE: 60 MMHG | HEART RATE: 75 BPM | WEIGHT: 102 LBS

## 2024-12-11 DIAGNOSIS — E87.8 LOW BICARBONATE LEVEL: ICD-10-CM

## 2024-12-11 DIAGNOSIS — R82.81 PYURIA: ICD-10-CM

## 2024-12-11 DIAGNOSIS — I10 ESSENTIAL HYPERTENSION: ICD-10-CM

## 2024-12-11 DIAGNOSIS — D63.8 ANEMIA, CHRONIC DISEASE: ICD-10-CM

## 2024-12-11 DIAGNOSIS — N18.32 STAGE 3B CHRONIC KIDNEY DISEASE (MULTI): Primary | ICD-10-CM

## 2024-12-11 PROCEDURE — 3077F SYST BP >= 140 MM HG: CPT | Performed by: INTERNAL MEDICINE

## 2024-12-11 PROCEDURE — 1123F ACP DISCUSS/DSCN MKR DOCD: CPT | Performed by: INTERNAL MEDICINE

## 2024-12-11 PROCEDURE — 1159F MED LIST DOCD IN RCRD: CPT | Performed by: INTERNAL MEDICINE

## 2024-12-11 PROCEDURE — 3078F DIAST BP <80 MM HG: CPT | Performed by: INTERNAL MEDICINE

## 2024-12-11 PROCEDURE — 99214 OFFICE O/P EST MOD 30 MIN: CPT | Performed by: INTERNAL MEDICINE

## 2024-12-11 PROCEDURE — 1157F ADVNC CARE PLAN IN RCRD: CPT | Performed by: INTERNAL MEDICINE

## 2024-12-11 RX ORDER — LOSARTAN POTASSIUM 100 MG/1
100 TABLET ORAL DAILY
Qty: 30 TABLET | Refills: 11 | Status: SHIPPED | OUTPATIENT
Start: 2024-12-11 | End: 2025-12-11

## 2024-12-20 DIAGNOSIS — I50.32 CHRONIC DIASTOLIC HEART FAILURE: ICD-10-CM

## 2024-12-20 RX ORDER — CARVEDILOL 6.25 MG/1
12.5 TABLET ORAL EVERY 12 HOURS
Qty: 360 TABLET | Refills: 1 | Status: SHIPPED | OUTPATIENT
Start: 2024-12-20 | End: 2025-06-18

## 2025-01-02 ENCOUNTER — APPOINTMENT (OUTPATIENT)
Dept: ORTHOPEDIC SURGERY | Facility: HOSPITAL | Age: 85
End: 2025-01-02
Payer: MEDICARE

## 2025-01-07 ENCOUNTER — APPOINTMENT (OUTPATIENT)
Dept: ORTHOPEDIC SURGERY | Facility: HOSPITAL | Age: 85
End: 2025-01-07
Payer: MEDICARE

## 2025-03-04 DIAGNOSIS — I50.32 CHRONIC DIASTOLIC HEART FAILURE: ICD-10-CM

## 2025-03-04 RX ORDER — CARVEDILOL 6.25 MG/1
12.5 TABLET ORAL EVERY 12 HOURS
Qty: 360 TABLET | Refills: 3 | Status: SHIPPED | OUTPATIENT
Start: 2025-03-04 | End: 2026-02-27

## 2025-03-17 DIAGNOSIS — Z00.00 HEALTH MAINTENANCE EXAMINATION: ICD-10-CM

## 2025-03-27 LAB
ALBUMIN SERPL-MCNC: 3.1 G/DL (ref 3.6–5.1)
ALBUMIN/GLOB SERPL: 1.6 (CALC) (ref 1–2.5)
ALP SERPL-CCNC: 46 U/L (ref 37–153)
ALT SERPL-CCNC: 14 U/L (ref 6–29)
AST SERPL-CCNC: 23 U/L (ref 10–35)
BILIRUB DIRECT SERPL-MCNC: 0.1 MG/DL
BILIRUB INDIRECT SERPL-MCNC: 0.2 MG/DL (CALC) (ref 0.2–1.2)
BILIRUB SERPL-MCNC: 0.3 MG/DL (ref 0.2–1.2)
GLOBULIN SER CALC-MCNC: 1.9 G/DL (CALC) (ref 1.9–3.7)
PROT SERPL-MCNC: 5 G/DL (ref 6.1–8.1)

## 2025-03-28 LAB
25(OH)D3+25(OH)D2 SERPL-MCNC: 32 NG/ML (ref 30–100)
ALBUMIN SERPL-MCNC: 3.1 G/DL (ref 3.6–5.1)
ALBUMIN/CREAT UR: 3338 MG/G CREAT
BUN SERPL-MCNC: 35 MG/DL (ref 7–25)
BUN/CREAT SERPL: 25 (CALC) (ref 6–22)
CALCIUM SERPL-MCNC: 8.3 MG/DL (ref 8.6–10.4)
CHLORIDE SERPL-SCNC: 118 MMOL/L (ref 98–110)
CO2 SERPL-SCNC: 18 MMOL/L (ref 20–32)
CREAT SERPL-MCNC: 1.42 MG/DL (ref 0.6–0.95)
CREAT UR-MCNC: 99 MG/DL (ref 20–275)
EGFRCR SERPLBLD CKD-EPI 2021: 36 ML/MIN/1.73M2
ERYTHROCYTE [DISTWIDTH] IN BLOOD BY AUTOMATED COUNT: 12.9 % (ref 11–15)
GLUCOSE SERPL-MCNC: 72 MG/DL (ref 65–99)
HCT VFR BLD AUTO: 26.7 % (ref 35–45)
HGB BLD-MCNC: 8.6 G/DL (ref 11.7–15.5)
MCH RBC QN AUTO: 32.6 PG (ref 27–33)
MCHC RBC AUTO-ENTMCNC: 32.2 G/DL (ref 32–36)
MCV RBC AUTO: 101.1 FL (ref 80–100)
MICROALBUMIN UR-MCNC: 330.5 MG/DL
PHOSPHATE SERPL-MCNC: 3.8 MG/DL (ref 2.1–4.3)
PLATELET # BLD AUTO: 255 THOUSAND/UL (ref 140–400)
PMV BLD REES-ECKER: 10.9 FL (ref 7.5–12.5)
POTASSIUM SERPL-SCNC: 4.2 MMOL/L (ref 3.5–5.3)
PTH-INTACT SERPL-MCNC: 91 PG/ML (ref 16–77)
RBC # BLD AUTO: 2.64 MILLION/UL (ref 3.8–5.1)
SODIUM SERPL-SCNC: 143 MMOL/L (ref 135–146)
WBC # BLD AUTO: 5.5 THOUSAND/UL (ref 3.8–10.8)

## 2025-04-01 RX ORDER — GABAPENTIN 100 MG/1
CAPSULE ORAL
Qty: 60 CAPSULE | Refills: 0 | Status: SHIPPED | OUTPATIENT
Start: 2025-04-01

## 2025-04-01 RX ORDER — VALACYCLOVIR HYDROCHLORIDE 1 G/1
TABLET, FILM COATED ORAL
Qty: 21 TABLET | Refills: 0 | Status: SHIPPED | OUTPATIENT
Start: 2025-04-01

## 2025-04-02 ENCOUNTER — OFFICE VISIT (OUTPATIENT)
Facility: CLINIC | Age: 85
End: 2025-04-02
Payer: MEDICARE

## 2025-04-02 ENCOUNTER — APPOINTMENT (OUTPATIENT)
Facility: CLINIC | Age: 85
End: 2025-04-02
Payer: MEDICARE

## 2025-04-02 VITALS
BODY MASS INDEX: 22.65 KG/M2 | TEMPERATURE: 99.2 F | OXYGEN SATURATION: 98 % | SYSTOLIC BLOOD PRESSURE: 147 MMHG | DIASTOLIC BLOOD PRESSURE: 74 MMHG | HEIGHT: 57 IN | WEIGHT: 105 LBS | HEART RATE: 81 BPM

## 2025-04-02 DIAGNOSIS — Z79.899 MEDICATION MANAGEMENT: Primary | ICD-10-CM

## 2025-04-02 DIAGNOSIS — E87.8 LOW BICARBONATE LEVEL: ICD-10-CM

## 2025-04-02 DIAGNOSIS — I50.32 CHRONIC DIASTOLIC HEART FAILURE: ICD-10-CM

## 2025-04-02 DIAGNOSIS — I10 ESSENTIAL HYPERTENSION: ICD-10-CM

## 2025-04-02 DIAGNOSIS — R13.12 OROPHARYNGEAL DYSPHAGIA: Primary | ICD-10-CM

## 2025-04-02 DIAGNOSIS — B02.9 HERPES ZOSTER WITHOUT COMPLICATION: ICD-10-CM

## 2025-04-02 DIAGNOSIS — N18.32 STAGE 3B CHRONIC KIDNEY DISEASE (MULTI): ICD-10-CM

## 2025-04-02 DIAGNOSIS — E87.20 METABOLIC ACIDOSIS: ICD-10-CM

## 2025-04-02 DIAGNOSIS — D63.8 ANEMIA, CHRONIC DISEASE: ICD-10-CM

## 2025-04-02 PROCEDURE — 1123F ACP DISCUSS/DSCN MKR DOCD: CPT | Performed by: FAMILY MEDICINE

## 2025-04-02 PROCEDURE — 1157F ADVNC CARE PLAN IN RCRD: CPT | Performed by: FAMILY MEDICINE

## 2025-04-02 PROCEDURE — 3078F DIAST BP <80 MM HG: CPT | Performed by: FAMILY MEDICINE

## 2025-04-02 PROCEDURE — 99215 OFFICE O/P EST HI 40 MIN: CPT | Performed by: FAMILY MEDICINE

## 2025-04-02 PROCEDURE — 3075F SYST BP GE 130 - 139MM HG: CPT | Performed by: FAMILY MEDICINE

## 2025-04-02 RX ORDER — MUPIROCIN 20 MG/G
OINTMENT TOPICAL
COMMUNITY
Start: 2025-03-20

## 2025-04-02 RX ORDER — CARVEDILOL 6.25 MG/1
TABLET ORAL
Qty: 270 TABLET | Refills: 3 | Status: SHIPPED | OUTPATIENT
Start: 2025-04-02

## 2025-04-02 RX ORDER — LOSARTAN POTASSIUM 100 MG/1
100 TABLET ORAL DAILY
Qty: 90 TABLET | Refills: 3 | Status: SHIPPED | OUTPATIENT
Start: 2025-04-02 | End: 2026-04-02

## 2025-04-02 RX ORDER — SODIUM BICARBONATE 650 MG/1
TABLET ORAL
Qty: 360 TABLET | Refills: 3 | Status: SHIPPED | OUTPATIENT
Start: 2025-04-02

## 2025-04-02 RX ORDER — SODIUM BICARBONATE 650 MG/1
TABLET ORAL
Qty: 120 TABLET | Refills: 3 | Status: SHIPPED | OUTPATIENT
Start: 2025-04-02 | End: 2025-04-02 | Stop reason: SDUPTHER

## 2025-04-02 RX ORDER — TORSEMIDE 20 MG/1
TABLET ORAL
Qty: 90 TABLET | Refills: 1 | Status: SHIPPED | OUTPATIENT
Start: 2025-04-02

## 2025-04-02 NOTE — PROGRESS NOTES
"Subjective   Patient ID: Xin Fajardo \"Prisca\" is a 84 y.o. female who presents for Follow-up.  HPI  Prisca was seen today, daughter Tigist present, to review her medications, specialty care, and recent concerns.    Medication(s) are being taken and tolerated as prescribed, without concerns, list reconciled today.    Her most recent concern, which was discussed yesterday by phone, instead of right sided flank neuropathic type pain, described as pins-and-needles, colicky in nature, not associated with any urinary symptoms.  She otherwise feels well, has no fever, chills, respiratory or gastro symptoms.  Very mild itching seems to be present, as she has found herself scratching the area, though there is no visible rash.  Prisca has never had shingles, has had the Varivax vaccine over 20 years ago, has not had Shingrix.  Given her unilateral neuropathic discomfort, I empirically started her on Valtrex 1000 mg 3 times daily, also prescribed low-dose gabapentin, as over-the-counter Tylenol have not been particularly effective.  Today she states that she has no pain whatsoever, feels well otherwise, has no new symptoms.  The 100 mg gabapentin dose does make her slightly foggy, though not stumble he or dizzy.    She continues losartan, carvedilol, and torsemide for hypertension and chronic diastolic heart failure.  She has a chronic degree of bilateral lower extremity edema, much better in the morning, more progressive as the day progresses.  She usually does not have discomfort from the edema, has not had blistering for quite some time.    Her chronic kidney disease is managed by nephrology, low bicarb treated with sodium bicarbonate, currently 2 tablets in the morning and 1 later in the day.  This was decreased after her last 2 bicarb levels were normal.  Recent CO2 level just checked was 18, chloride 118.  Creatinine level typically in the 1.4 range, has been stable.    She has end-stage right shoulder osteoarthritis, " had entertained the possibility of a right total shoulder replacement.  She is doing sufficiently well from a function standpoint but she is not interested in surgical intervention at this time.  She is right-handed, has gotten good at using her left.      Prisca has been more active since her last visit, she has been playing bridge, walking more, getting out of the house more.    She does note dysphagia symptoms, particularly with dense proteins, required the Heimlich maneuver not long ago.  At times she does have difficulty swallowing liquids as well.  She was slated for an EGD last year to evaluate upper GI symptoms and to rule out an upper GI source of her anemia, which has been attributed to her CKD.    She continues Prolia biannually, though has not taken vitamin D recently, vitamin D level very low normal at 32.    Prisca continues [place] Thyroid, managed elsewhere.  TSH levels have been normal.    She checks blood pressures at home periodically, typically runs high with her wrist cuff, 1 60-1 70 systolic.  In office her systolics are typically in the 140 range.      Review of Systems  The full review of systems is negative with the exception of what is noted in HPI    Allergies   Allergen Reactions    Amlodipine Swelling       Current Outpatient Medications:     acetaminophen (Tylenol 8 HOUR) 650 mg ER tablet, Take 1 tablet (650 mg) by mouth every 8 hours if needed (for shoulder and back pain)., Disp: , Rfl:     carvedilol (Coreg) 6.25 mg tablet, Take 2 tablets in the AM and 1 tablet in the PM, Disp: 270 tablet, Rfl: 3    cholecalciferol (Vitamin D-3) 50 mcg (2,000 unit) capsule, Take 1 capsule (50 mcg) by mouth once daily., Disp: , Rfl:     denosumab (Prolia) 60 mg/mL syringe, Inject 1 mL (60 mg) under the skin every 6 months., Disp: , Rfl:     gabapentin (Neurontin) 100 mg capsule, Take 1 capsule 2-3 times daily for nerve pain, Disp: 60 capsule, Rfl: 0    losartan (Cozaar) 100 mg tablet, Take 1 tablet (100  mg) by mouth once daily., Disp: 90 tablet, Rfl: 3    multivitamin-min-iron-FA-vit K (Bariatric Multivitamins) 45 mg iron- 800 mcg-120 mcg capsule, Take 1 capsule by mouth once daily., Disp: , Rfl:     mupirocin (Bactroban) 2 % ointment, apply one application to the affected area(s) 3 times a day, Disp: , Rfl:     sodium bicarbonate 650 mg tablet, Take 2 tablets twice daily, Disp: 360 tablet, Rfl: 3    thyroid, pork, ([place] Thyroid) 90 mg tablet, Take 1 tablet (90 mg) by mouth once daily in the morning. Take before meals., Disp: , Rfl:     torsemide (Demadex) 20 mg tablet, Take 1 tablet daily as needed/directed for swelling, Disp: 90 tablet, Rfl: 1    valACYclovir (Valtrex) 1 gram tablet, Take 1 tablet 3 times daily for 1 week, Disp: 21 tablet, Rfl: 0  Past Medical History:   Diagnosis Date    Essential (primary) hypertension     HTN (hypertension), benign    Personal history of other diseases of the circulatory system     History of congestive heart failure    Personal history of other diseases of the circulatory system     History of coronary artery disease    Personal history of other endocrine, nutritional and metabolic disease     History of hypothyroidism    Personal history of other malignant neoplasm of bronchus and lung     History of malignant neoplasm of lung    Personal history of other malignant neoplasm of large intestine     History of malignant neoplasm of colon     Social History     Tobacco Use    Smoking status: Former     Current packs/day: 0.00     Average packs/day: 0.5 packs/day for 10.0 years (5.0 ttl pk-yrs)     Types: Cigarettes     Start date:      Quit date:      Years since quittin.2     Passive exposure: Never    Smokeless tobacco: Never   Substance Use Topics    Alcohol use: Yes     Alcohol/week: 1.0 - 2.0 standard drink of alcohol     Types: 1 - 2 Glasses of wine per week     Comment: occationally    Drug use: Never     Office Visit on 2025   Component Date Value  Ref Range Status    PROTEIN, TOTAL 03/27/2025 5.0 (L)  6.1 - 8.1 g/dL Final    ALBUMIN 03/27/2025 3.1 (L)  3.6 - 5.1 g/dL Final    GLOBULIN 03/27/2025 1.9  1.9 - 3.7 g/dL (calc) Final    ALBUMIN/GLOBULIN RATIO 03/27/2025 1.6  1.0 - 2.5 (calc) Final    BILIRUBIN, TOTAL 03/27/2025 0.3  0.2 - 1.2 mg/dL Final    BILIRUBIN, DIRECT 03/27/2025 0.1  < OR = 0.2 mg/dL Final    BILIRUBIN, INDIRECT 03/27/2025 0.2  0.2 - 1.2 mg/dL (calc) Final    ALKALINE PHOSPHATASE 03/27/2025 46  37 - 153 U/L Final    AST 03/27/2025 23  10 - 35 U/L Final    ALT 03/27/2025 14  6 - 29 U/L Final   Orders Only on 03/27/2025   Component Date Value Ref Range Status    GLUCOSE 03/27/2025 72  65 - 99 mg/dL Final    Comment:               Fasting reference interval         UREA NITROGEN (BUN) 03/27/2025 35 (H)  7 - 25 mg/dL Final    CREATININE 03/27/2025 1.42 (H)  0.60 - 0.95 mg/dL Final    EGFR 03/27/2025 36 (L)  > OR = 60 mL/min/1.73m2 Final    BUN/CREATININE RATIO 03/27/2025 25 (H)  6 - 22 (calc) Final    SODIUM 03/27/2025 143  135 - 146 mmol/L Final    POTASSIUM 03/27/2025 4.2  3.5 - 5.3 mmol/L Final    CHLORIDE 03/27/2025 118 (H)  98 - 110 mmol/L Final    CARBON DIOXIDE 03/27/2025 18 (L)  20 - 32 mmol/L Final    CALCIUM 03/27/2025 8.3 (L)  8.6 - 10.4 mg/dL Final    PHOSPHATE (AS PHOSPHORUS) 03/27/2025 3.8  2.1 - 4.3 mg/dL Final    ALBUMIN 03/27/2025 3.1 (L)  3.6 - 5.1 g/dL Final    CREATININE, RANDOM URINE 03/27/2025 99  20 - 275 mg/dL Final    ALBUMIN, URINE 03/27/2025 330.5  See Note: mg/dL Final    Comment: Reference Range:    Reference Range  Not established     Results verified by repeat analysis on dilution.         ALBUMIN/CREATININE RATIO, RANDOM U* 03/27/2025 3,338 (H)  <30 mg/g creat Final    Comment:    The ADA defines abnormalities in albumin  excretion as follows:     Albuminuria Category        Result (mg/g creatinine)     Normal to Mildly increased   <30  Moderately increased            Severely increased           > OR =  300     The ADA recommends that at least two of three  specimens collected within a 3-6 month period be  abnormal before considering a patient to be  within a diagnostic category.      WHITE BLOOD CELL COUNT 03/27/2025 5.5  3.8 - 10.8 Thousand/uL Final    RED BLOOD CELL COUNT 03/27/2025 2.64 (L)  3.80 - 5.10 Million/uL Final    HEMOGLOBIN 03/27/2025 8.6 (L)  11.7 - 15.5 g/dL Final    HEMATOCRIT 03/27/2025 26.7 (L)  35.0 - 45.0 % Final    MCV 03/27/2025 101.1 (H)  80.0 - 100.0 fL Final    MCH 03/27/2025 32.6  27.0 - 33.0 pg Final    MCHC 03/27/2025 32.2  32.0 - 36.0 g/dL Final    Comment: For adults, a slight decrease in the calculated MCHC  value (in the range of 30 to 32 g/dL) is most likely  not clinically significant; however, it should be  interpreted with caution in correlation with other  red cell parameters and the patient's clinical  condition.      RDW 03/27/2025 12.9  11.0 - 15.0 % Final    PLATELET COUNT 03/27/2025 255  140 - 400 Thousand/uL Final    MPV 03/27/2025 10.9  7.5 - 12.5 fL Final    PARATHYROID HORMONE, INTACT 03/27/2025 91 (H)  16 - 77 pg/mL Final    Comment:    Interpretive Guide    Intact PTH           Calcium  ------------------    ----------           -------  Normal Parathyroid    Normal               Normal  Hypoparathyroidism    Low or Low Normal    Low  Hyperparathyroidism     Primary            Normal or High       High     Secondary          High                 Normal or Low     Tertiary           High                 High  Non-Parathyroid     Hypercalcemia      Low or Low Normal    High         VITAMIN D,25-OH,TOTAL,IA 03/27/2025 32  30 - 100 ng/mL Final    Comment: Vitamin D Status         25-OH Vitamin D:     Deficiency:                    <20 ng/mL  Insufficiency:             20 - 29 ng/mL  Optimal:                 > or = 30 ng/mL     For 25-OH Vitamin D testing on patients on   D2-supplementation and patients for whom quantitation   of D2 and D3 fractions is required, the  [employer]()  25-OH VIT D, (D2,D3), LC/MS/MS is recommended: order   code 56612 (patients >2yrs).     See Note 1     Note 1     For additional information, please refer to   http://education.[employer]/faq/UHW913   (This link is being provided for informational/  educational purposes only.)     Lab on 11/04/2024   Component Date Value Ref Range Status    Glucose 11/04/2024 93  74 - 99 mg/dL Final    Sodium 11/04/2024 149 (H)  136 - 145 mmol/L Final    Potassium 11/04/2024 3.7  3.5 - 5.3 mmol/L Final    Chloride 11/04/2024 111 (H)  98 - 107 mmol/L Final    Bicarbonate 11/04/2024 27  21 - 32 mmol/L Final    Anion Gap 11/04/2024 15  10 - 20 mmol/L Final    Urea Nitrogen 11/04/2024 37 (H)  6 - 23 mg/dL Final    Creatinine 11/04/2024 1.49 (H)  0.50 - 1.05 mg/dL Final    eGFR 11/04/2024 34 (L)  >60 mL/min/1.73m*2 Final    Calculations of estimated GFR are performed using the 2021 CKD-EPI Study Refit equation without the race variable for the IDMS-Traceable creatinine methods.  https://jasn.asnjournals.org/content/early/2021/09/22/ASN.8326295778    Calcium 11/04/2024 9.0  8.6 - 10.6 mg/dL Final    Albumin 11/04/2024 3.2 (L)  3.4 - 5.0 g/dL Final    Alkaline Phosphatase 11/04/2024 52  33 - 136 U/L Final    Total Protein 11/04/2024 5.5 (L)  6.4 - 8.2 g/dL Final    AST 11/04/2024 16  9 - 39 U/L Final    Bilirubin, Total 11/04/2024 0.3  0.0 - 1.2 mg/dL Final    ALT 11/04/2024 10  7 - 45 U/L Final    Patients treated with Sulfasalazine may generate falsely decreased results for ALT.    WBC 11/04/2024 7.2  4.4 - 11.3 x10*3/uL Final    nRBC 11/04/2024 0.0  0.0 - 0.0 /100 WBCs Final    RBC 11/04/2024 2.83 (L)  4.00 - 5.20 x10*6/uL Final    Hemoglobin 11/04/2024 9.0 (L)  12.0 - 16.0 g/dL Final    Hematocrit 11/04/2024 28.7 (L)  36.0 - 46.0 % Final    MCV 11/04/2024 101 (H)  80 - 100 fL Final    MCH 11/04/2024 31.8  26.0 - 34.0 pg Final    MCHC 11/04/2024 31.4 (L)  32.0 - 36.0 g/dL Final    RDW 11/04/2024 13.2  11.5 -  "14.5 % Final    Platelets 11/04/2024 282  150 - 450 x10*3/uL Final    Neutrophils % 11/04/2024 60.0  40.0 - 80.0 % Final    Immature Granulocytes %, Automated 11/04/2024 0.3  0.0 - 0.9 % Final    Immature Granulocyte Count (IG) includes promyelocytes, myelocytes and metamyelocytes but does not include bands. Percent differential counts (%) should be interpreted in the context of the absolute cell counts (cells/UL).    Lymphocytes % 11/04/2024 26.7  13.0 - 44.0 % Final    Monocytes % 11/04/2024 9.8  2.0 - 10.0 % Final    Eosinophils % 11/04/2024 2.5  0.0 - 6.0 % Final    Basophils % 11/04/2024 0.7  0.0 - 2.0 % Final    Neutrophils Absolute 11/04/2024 4.34  1.60 - 5.50 x10*3/uL Final    Percent differential counts (%) should be interpreted in the context of the absolute cell counts (cells/uL).    Immature Granulocytes Absolute, Au* 11/04/2024 0.02  0.00 - 0.50 x10*3/uL Final    Lymphocytes Absolute 11/04/2024 1.93  0.80 - 3.00 x10*3/uL Final    Monocytes Absolute 11/04/2024 0.71  0.05 - 0.80 x10*3/uL Final    Eosinophils Absolute 11/04/2024 0.18  0.00 - 0.40 x10*3/uL Final    Basophils Absolute 11/04/2024 0.05  0.00 - 0.10 x10*3/uL Final    Iron 11/04/2024 72  35 - 150 ug/dL Final    UIBC 11/04/2024 208  110 - 370 ug/dL Final    TIBC 11/04/2024 280  240 - 445 ug/dL Final    % Saturation 11/04/2024 26  25 - 45 % Final    Magnesium 11/04/2024 1.29 (L)  1.60 - 2.40 mg/dL Final    Phosphorus 11/04/2024 5.3 (H)  2.5 - 4.9 mg/dL Final    The performance characteristics of phosphorus testing in heparinized plasma have been validated by the individual  laboratory site where testing is performed. Testing on heparinized plasma is not approved by the FDA; however, such approval is not necessary.         Objective   /62 (BP Location: Left arm, Patient Position: Sitting, BP Cuff Size: Adult)   Pulse 81   Temp 37.3 °C (99.2 °F)   Ht 1.448 m (4' 9\")   Wt 47.6 kg (105 lb)   BMI 22.72 kg/m²   Physical " Exam  Constitutional/General appearance: alert, oriented, well-appearing, in no distress  No scleral icterus or conjunctival erythema present  Hearing is grossly normal  Respiratory effort is normal, no dyspnea noted  Cortical function is normal  Mood, affect, are pleasant, appropriate, and interactive.  Insight is normal  Lung exam reveals no wheezing, rhonchi, rales.  Cardiac exam reveals a regular rate and rhythm,  murmur present.   Lungs are clear bilaterally.    2+ bilateral lower extremity edema to the mid tibial region noted    Assessment/Plan   As noted in HPI, after discussion yesterday and today, I believe she is in the prodromal phase of shingles, i.e. just neuropathic pain, no rash at present.  Valtrex, which was started yesterday, should help prevent a full outbreak, reduce the chance of long-term neuropathy which is extremely unlikely in this case.  She can take 100 mg of gabapentin as needed rather than regularly, as it has caused a bit of brain fogginess.    Hypertension--- though home blood pressures are elevated, today's blood pressure is reassuring, continue losartan 100 mg daily and carvedilol totaling 12.5 mg in the morning, 6.25 mg in the evening.     Chronic diastolic heart failure, continue ARB, beta-blocker, torsemide as prescribed.  Keep cardiology follow-up as scheduled, Dr. Wright    Chronic kidney disease, recent labs stable with the exception of his CO2 being low, has a history of bicarbonate deficiency.  After November labs she decreased sodium bicarbonate to 3 tabs total daily instead of 4.  I recommend resuming 2 tablets twice daily, refills updated, keep follow-up with nephrology.      Vitamin D remains low normal, her only vitamin D source from a supplement standpoint is her multivitamin.  I recommend she take an additional 2000 units of over-the-counter vitamin D3 every day.  Calcium level is low, likely due in part to low albumin.  She in general should follow a low protein  diet because of her CKD, though she has previously taken and tolerated one  Ensure high protein drink daily.  I think it is reasonable to resume this.    Chronic, severe right shoulder pain, end-stage arthritis, total shoulder arthroplasty previously discussed.  She is comfortable with conservative treatment, remains functional enough to not warrant surgical intervention.  She can continue Tylenol as needed.    Dysphagia, EGD had been scheduled last year, held off because of other more pressing health issues.  Recommend it now, may need dilation, will help facilitate scheduling.  Previously took famotidine, no other GERD symptoms at present, will hold off on antacid therapy.    Osteoporosis, continue biannual Prolia, specialty managed.  Vitamin D as noted above.  Bone densities every 2 years.    Hypothyroidism, stable on [place] Thyroid, managed elsewhere.  Recommend TSH at least annually.    Follow-up as scheduled    Total time spent with patient, reviewing records, and completing charting was over 90 minutes, over half of it spent counseling and/or coordinating care  **Portions of this medical record have been created using voice recognition software and may have minor errors which are inherent in voice recognition systems. It has not been fully edited for typographical or grammatical errors**

## 2025-04-03 VITALS
BODY MASS INDEX: 22.65 KG/M2 | WEIGHT: 105 LBS | DIASTOLIC BLOOD PRESSURE: 62 MMHG | HEART RATE: 81 BPM | TEMPERATURE: 99.2 F | HEIGHT: 57 IN | SYSTOLIC BLOOD PRESSURE: 138 MMHG

## 2025-04-30 ENCOUNTER — APPOINTMENT (OUTPATIENT)
Dept: NEPHROLOGY | Facility: CLINIC | Age: 85
End: 2025-04-30
Payer: MEDICARE

## 2025-05-02 ENCOUNTER — APPOINTMENT (OUTPATIENT)
Dept: CARDIOLOGY | Facility: CLINIC | Age: 85
End: 2025-05-02
Payer: MEDICARE

## 2025-05-02 VITALS
HEART RATE: 62 BPM | SYSTOLIC BLOOD PRESSURE: 144 MMHG | DIASTOLIC BLOOD PRESSURE: 60 MMHG | OXYGEN SATURATION: 99 % | BODY MASS INDEX: 22.72 KG/M2 | WEIGHT: 105 LBS

## 2025-05-02 DIAGNOSIS — R06.09 DYSPNEA ON EXERTION: ICD-10-CM

## 2025-05-02 DIAGNOSIS — N18.32 CHRONIC KIDNEY DISEASE (CKD) STAGE G3B/A2, MODERATELY DECREASED GLOMERULAR FILTRATION RATE (GFR) BETWEEN 30-44 ML/MIN/1.73 SQUARE METER AND ALBUMINURIA CREATININE RATIO BETWEEN 30-299 MG/G (C* (MULTI): ICD-10-CM

## 2025-05-02 DIAGNOSIS — I50.32 CHRONIC DIASTOLIC HEART FAILURE: Primary | ICD-10-CM

## 2025-05-02 DIAGNOSIS — I10 ESSENTIAL HYPERTENSION: ICD-10-CM

## 2025-05-02 DIAGNOSIS — I34.0 NONRHEUMATIC MITRAL VALVE REGURGITATION: ICD-10-CM

## 2025-05-02 PROCEDURE — 1036F TOBACCO NON-USER: CPT | Performed by: STUDENT IN AN ORGANIZED HEALTH CARE EDUCATION/TRAINING PROGRAM

## 2025-05-02 PROCEDURE — 3078F DIAST BP <80 MM HG: CPT | Performed by: STUDENT IN AN ORGANIZED HEALTH CARE EDUCATION/TRAINING PROGRAM

## 2025-05-02 PROCEDURE — 3077F SYST BP >= 140 MM HG: CPT | Performed by: STUDENT IN AN ORGANIZED HEALTH CARE EDUCATION/TRAINING PROGRAM

## 2025-05-02 PROCEDURE — 1160F RVW MEDS BY RX/DR IN RCRD: CPT | Performed by: STUDENT IN AN ORGANIZED HEALTH CARE EDUCATION/TRAINING PROGRAM

## 2025-05-02 PROCEDURE — 99213 OFFICE O/P EST LOW 20 MIN: CPT | Performed by: STUDENT IN AN ORGANIZED HEALTH CARE EDUCATION/TRAINING PROGRAM

## 2025-05-02 PROCEDURE — 1159F MED LIST DOCD IN RCRD: CPT | Performed by: STUDENT IN AN ORGANIZED HEALTH CARE EDUCATION/TRAINING PROGRAM

## 2025-05-02 ASSESSMENT — ENCOUNTER SYMPTOMS
HEMATOLOGIC/LYMPHATIC NEGATIVE: 1
PSYCHIATRIC NEGATIVE: 1
GASTROINTESTINAL NEGATIVE: 1
PALPITATIONS: 0
NEAR-SYNCOPE: 0
NEUROLOGICAL NEGATIVE: 1
ENDOCRINE NEGATIVE: 1
EYES NEGATIVE: 1
SHORTNESS OF BREATH: 1
ORTHOPNEA: 0
PND: 0
DYSPNEA ON EXERTION: 1
MUSCULOSKELETAL NEGATIVE: 1
CONSTITUTIONAL NEGATIVE: 1
ALLERGIC/IMMUNOLOGIC NEGATIVE: 1
SYNCOPE: 0

## 2025-05-02 NOTE — PROGRESS NOTES
" Cardiology Established Outpatient Visit    Reason for visit: chronic diastolic heart failure; mitral regurgitation     HPI: Xin Fajardo \"Osiris" is a 84 y.o.  female who presents today for chronic diastolic heart failure; mitral regurgitation . Past medical history of chronic diastolic heart failure, mitral regurgitation (Moderate per TTE ), primary hypertension, dyslipidemia, hypothyroidism, Stage IIIb CKD, osteoporosis, hx colon + lung cancer, hx thyroid cancer.     Xin presented to cardiology clinic on 2024.  Patient notes worsening dyspnea over the past year.  No recent hospitalizations for acute on chronic heart failure. History of 5-FU for colon cancer in remote past.  Patient with a history of moderate MR per transthoracic echocardiogram . Recommended further evaluation with repeat TTE and checking BNP.     Xin presented to cardiology clinic on 2024 for a follow-up visit.  Dyspnea unchanged. Dyspnea at rest and worse with exertion.  BNP was un-elevated. TTE showed normal LV size and systolic function; trace to mild MR.  Given exertional dyspnea (anginal equivalent), HTN, DLD, and family history of ASHD (father-  of MI age 56) > recommend further evaluation with NM stress.      Prisca presented to cardiology clinic on 2024.  No new symptoms at this time.  Nuclear medicine stress test was low risk for ischemia.  Per patient home blood pressure is well-controlled.  Will continue carvedilol and losartan.    Prisca presented to cardiology clinic on 2025.  No new cardiac symptoms at this time.  Chronic exertional dyspnea at baseline.  Patient appears euvolemic on exam.  Per patient home blood pressure is well-controlled.  Will continue cardiac management as below and follow-up in 6 to 12 months.    Past Medical History:   - As above    Surgical History:   She has a past surgical history that includes Other surgical history (2021); Other surgical history " (2021); Other surgical history (2021); Other surgical history (2021); US guided percutaneous peritoneal or retroperitoneal fluid collection drainage (2013); and US guided abscess drain (2013).    Family History:   Family History   Problem Relation Name Age of Onset    Heart disease Mother      Heart attack Father  56         from MI    Coronary artery disease Father      Breast cancer Sister         Allergies:  Amlodipine     Social History:   - Non smoker (quit in remote past- quit ); no illicit drug use    Prior Cardiovascular Testing (personally reviewed):     NM stress (2024)  1. Normal stress myocardial perfusion imaging in response to  pharmacologic stress.  2. Well-maintained left ventricular function.  77%    ECG 2024)- Normal sinus rhythm; normal ECG     TTE (2024)  1. Left ventricular systolic function is normal with a 65-70% estimated ejection fraction.   2. Spectral Doppler shows an impaired relaxation pattern of left ventricular diastolic filling.   3. RVSP within normal limits.   4. Trace to mild mitral regurgitation    TTE ()- moderate MR     Review of Systems:  Review of Systems   Constitutional: Negative.   HENT: Negative.     Eyes: Negative.    Cardiovascular:  Positive for dyspnea on exertion. Negative for chest pain, near-syncope, orthopnea, palpitations, paroxysmal nocturnal dyspnea and syncope.   Respiratory:  Positive for shortness of breath.    Endocrine: Negative.    Hematologic/Lymphatic: Negative.    Skin: Negative.    Musculoskeletal: Negative.    Gastrointestinal: Negative.    Genitourinary: Negative.    Neurological: Negative.    Psychiatric/Behavioral: Negative.     Allergic/Immunologic: Negative.        Objective     Outpatient Medications:    Current Outpatient Medications:     acetaminophen (Tylenol 8 HOUR) 650 mg ER tablet, Take 1 tablet (650 mg) by mouth every 8 hours if needed (for shoulder and back pain)., Disp: , Rfl:      carvedilol (Coreg) 6.25 mg tablet, Take 2 tablets in the AM and 1 tablet in the PM, Disp: 270 tablet, Rfl: 3    cholecalciferol (Vitamin D-3) 50 mcg (2,000 unit) capsule, Take 1 capsule (2,000 Units) by mouth once daily., Disp: , Rfl:     denosumab (Prolia) 60 mg/mL syringe, Inject 1 mL (60 mg) under the skin every 6 months., Disp: , Rfl:     gabapentin (Neurontin) 100 mg capsule, Take 1 capsule 2-3 times daily for nerve pain, Disp: 60 capsule, Rfl: 0    losartan (Cozaar) 100 mg tablet, Take 1 tablet (100 mg) by mouth once daily., Disp: 90 tablet, Rfl: 3    multivitamin-min-iron-FA-vit K (Bariatric Multivitamins) 45 mg iron- 800 mcg-120 mcg capsule, Take 1 capsule by mouth once daily., Disp: , Rfl:     mupirocin (Bactroban) 2 % ointment, apply one application to the affected area(s) 3 times a day, Disp: , Rfl:     sodium bicarbonate 650 mg tablet, Take 2 tablets twice daily, Disp: 360 tablet, Rfl: 3    thyroid, pork, ([place] Thyroid) 90 mg tablet, Take 1 tablet (90 mg) by mouth once daily in the morning. Take before meals., Disp: , Rfl:     torsemide (Demadex) 20 mg tablet, Take 1 tablet daily as needed/directed for swelling, Disp: 90 tablet, Rfl: 1    valACYclovir (Valtrex) 1 gram tablet, Take 1 tablet 3 times daily for 1 week, Disp: 21 tablet, Rfl: 0     Last Recorded Vitals  /60   Pulse 62   Wt 47.6 kg (105 lb)   SpO2 99%   BMI 22.72 kg/m²     Physical Exam:  Physical Exam  Constitutional:       General: She is not in acute distress.  HENT:      Head: Normocephalic.      Mouth/Throat:      Mouth: Mucous membranes are moist.   Eyes:      Extraocular Movements: Extraocular movements intact.      Conjunctiva/sclera: Conjunctivae normal.   Neck:      Vascular: No carotid bruit or JVD.   Cardiovascular:      Rate and Rhythm: Normal rate and regular rhythm.      Pulses: Normal pulses.      Heart sounds: No murmur heard.  Pulmonary:      Effort: Pulmonary effort is normal. No respiratory distress.      Breath  sounds: Normal breath sounds.   Abdominal:      General: Bowel sounds are normal. There is no distension.      Palpations: Abdomen is soft.   Musculoskeletal:         General: No swelling.   Skin:     General: Skin is warm and dry.   Neurological:      General: No focal deficit present.      Mental Status: She is alert.      Cranial Nerves: No cranial nerve deficit.      Motor: No weakness.   Psychiatric:         Mood and Affect: Mood normal.         Behavior: Behavior normal.         Lab Review:    Lab Results   Component Value Date    GLUCOSE 72 03/27/2025    CALCIUM 8.3 (L) 03/27/2025     03/27/2025    K 4.2 03/27/2025    CO2 18 (L) 03/27/2025     (H) 03/27/2025    BUN 35 (H) 03/27/2025    CREATININE 1.42 (H) 03/27/2025       Lab Results   Component Value Date    BNP 72 06/04/2024       Assessment:   84 y.o.  female who presents today for chronic diastolic heart failure; mitral regurgitation . Past medical history of chronic diastolic heart failure, mitral regurgitation (Moderate per TTE 2022), primary hypertension, dyslipidemia, hypothyroidism, Stage IIIb CKD, osteoporosis, hx colon + lung cancer, hx thyroid cancer.     Prisca presented to cardiology clinic on 5/2/2025.  No new cardiac symptoms at this time.  Chronic exertional dyspnea at baseline.  Patient appears euvolemic on exam.  Per patient home blood pressure is well-controlled.  Will continue cardiac management as below and follow-up in 6 to 12 months.    Overall Plan:  1. Chronic dyspnea; worse with exertion; cardiac risk factors as above  -Nuclear medicine stress test was low for ischemia  -Symptoms at baseline; will defer additional testing at this time    2. Hx mitral regurgitation  - trace to mild on prior TTE > unlikely to explain patient's dyspnea  - monitor clinically    3. Primary hypertension  - goal BP < 130/90 mmHg  - continue losartan, carvedilol    4. Stage IIIb CKD  - avoid NSAIDs  - renally dose medications    5.   "Lower extremity edema (improved)   - Continue torsemide 20 mg daily    6. Discussed \"red flag\" symptoms that should prompt immediate medical attention; patient verbalized understanding    Disposition: Return to cardiology clinic in 6-12 months    Idris Wright MD        "

## 2025-05-02 NOTE — PATIENT INSTRUCTIONS
Thank you for your visit today. Please contact our office (via MyChart or phone) with any additional questions.     [facility] [place]    Denisse, RN/Clinic Nurse for:    Dr. Adriana Blackmon    [address], [address]  [place] [address]    Phone: [phone] Press Option 5 then Option 3 to speak with the Clinic Nurse (Denisse)    _____    To Reach:    Billing Questions -    [phone]  Scheduling / Rescheduling -  Option 1  Refills / Medication Requests -  Option 3  General Office / [place] -  Option 4  Results -     Option 6  Medical Records -    Option 7  Repeat Options -    Option 9

## 2025-05-07 ENCOUNTER — APPOINTMENT (OUTPATIENT)
Dept: NEPHROLOGY | Facility: CLINIC | Age: 85
End: 2025-05-07
Payer: MEDICARE

## 2025-06-10 DIAGNOSIS — E87.8 LOW BICARBONATE LEVEL: ICD-10-CM

## 2025-06-10 DIAGNOSIS — E87.20 METABOLIC ACIDOSIS: ICD-10-CM

## 2025-06-10 RX ORDER — SODIUM BICARBONATE 650 MG/1
TABLET ORAL
Qty: 360 TABLET | Refills: 3 | Status: SHIPPED | OUTPATIENT
Start: 2025-06-10

## 2025-06-10 NOTE — PROGRESS NOTES
"  [facility]    [address]  [place] [address]    Phone: [phone]  Fax: [phone]    Progress Note        Subjective   Patient ID: Xin Fajardo \"Osiris" is a 84 y.o. female who presents for No chief complaint on file..  HPI    Review of Systems    RX Allergies[1]  Current Medications[2]  Medical History[3]  Social History[4]      Objective   There were no vitals taken for this visit.  Physical Exam    Assessment/Plan         Total time spent with patient, reviewing records, and completing charting was 30 minutes, over half of it spent counseling and/or coordinating care  **Portions of this medical record have been created using voice recognition software and may have minor errors which are inherent in voice recognition systems. It has not been fully edited for typographical or grammatical errors**           [1]   Allergies  Allergen Reactions    Amlodipine Swelling   [2]   Current Outpatient Medications:     acetaminophen (Tylenol 8 HOUR) 650 mg ER tablet, Take 1 tablet (650 mg) by mouth every 8 hours if needed (for shoulder and back pain)., Disp: , Rfl:     carvedilol (Coreg) 6.25 mg tablet, Take 2 tablets in the AM and 1 tablet in the PM, Disp: 270 tablet, Rfl: 3    cholecalciferol (Vitamin D-3) 50 mcg (2,000 unit) capsule, Take 1 capsule (2,000 Units) by mouth once daily., Disp: , Rfl:     denosumab (Prolia) 60 mg/mL syringe, Inject 1 mL (60 mg) under the skin every 6 months., Disp: , Rfl:     gabapentin (Neurontin) 100 mg capsule, Take 1 capsule 2-3 times daily for nerve pain, Disp: 60 capsule, Rfl: 0    losartan (Cozaar) 100 mg tablet, Take 1 tablet (100 mg) by mouth once daily., Disp: 90 tablet, Rfl: 3    multivitamin-min-iron-FA-vit K (Bariatric Multivitamins) 45 mg iron- 800 mcg-120 mcg capsule, Take 1 capsule by mouth once daily., Disp: , Rfl:     mupirocin (Bactroban) 2 % ointment, apply one application to the affected area(s) 3 times a day, " Disp: , Rfl:     sodium bicarbonate 650 mg tablet, Take 2 tablets twice daily, Disp: 360 tablet, Rfl: 3    thyroid, pork, ([place] Thyroid) 90 mg tablet, Take 1 tablet (90 mg) by mouth once daily in the morning. Take before meals., Disp: , Rfl:     torsemide (Demadex) 20 mg tablet, Take 1 tablet daily as needed/directed for swelling, Disp: 90 tablet, Rfl: 1    valACYclovir (Valtrex) 1 gram tablet, Take 1 tablet 3 times daily for 1 week, Disp: 21 tablet, Rfl: 0  [3]   Past Medical History:  Diagnosis Date    Essential (primary) hypertension     HTN (hypertension), benign    Personal history of other diseases of the circulatory system     History of congestive heart failure    Personal history of other diseases of the circulatory system     History of coronary artery disease    Personal history of other endocrine, nutritional and metabolic disease     History of hypothyroidism    Personal history of other malignant neoplasm of bronchus and lung     History of malignant neoplasm of lung    Personal history of other malignant neoplasm of large intestine     History of malignant neoplasm of colon   [4]   Social History  Tobacco Use    Smoking status: Former     Current packs/day: 0.00     Average packs/day: 0.5 packs/day for 10.0 years (5.0 ttl pk-yrs)     Types: Cigarettes     Start date:      Quit date:      Years since quittin.4     Passive exposure: Never    Smokeless tobacco: Never   Substance Use Topics    Alcohol use: Yes     Alcohol/week: 1.0 - 2.0 standard drink of alcohol     Types: 1 - 2 Glasses of wine per week     Comment: occationally    Drug use: Never

## 2025-07-01 ENCOUNTER — APPOINTMENT (OUTPATIENT)
Facility: CLINIC | Age: 85
End: 2025-07-01
Payer: MEDICARE

## 2025-07-01 VITALS
WEIGHT: 102 LBS | SYSTOLIC BLOOD PRESSURE: 144 MMHG | HEART RATE: 73 BPM | TEMPERATURE: 98.9 F | BODY MASS INDEX: 22.07 KG/M2 | DIASTOLIC BLOOD PRESSURE: 56 MMHG

## 2025-07-01 DIAGNOSIS — Z79.899 MEDICATION MANAGEMENT: ICD-10-CM

## 2025-07-01 DIAGNOSIS — N18.32 CHRONIC KIDNEY DISEASE (CKD) STAGE G3B/A2, MODERATELY DECREASED GLOMERULAR FILTRATION RATE (GFR) BETWEEN 30-44 ML/MIN/1.73 SQUARE METER AND ALBUMINURIA CREATININE RATIO BETWEEN 30-299 MG/G (C* (MULTI): ICD-10-CM

## 2025-07-01 DIAGNOSIS — E87.8 LOW BICARBONATE LEVEL: ICD-10-CM

## 2025-07-01 DIAGNOSIS — E89.0 POST-SURGICAL HYPOTHYROIDISM: ICD-10-CM

## 2025-07-01 DIAGNOSIS — D63.8 ANEMIA, CHRONIC DISEASE: ICD-10-CM

## 2025-07-01 DIAGNOSIS — M48.062 SPINAL STENOSIS OF LUMBAR REGION WITH NEUROGENIC CLAUDICATION: ICD-10-CM

## 2025-07-01 DIAGNOSIS — I50.32 CHRONIC DIASTOLIC HEART FAILURE: ICD-10-CM

## 2025-07-01 DIAGNOSIS — I10 ESSENTIAL HYPERTENSION: Primary | ICD-10-CM

## 2025-07-01 DIAGNOSIS — R13.12 OROPHARYNGEAL DYSPHAGIA: Primary | ICD-10-CM

## 2025-07-01 DIAGNOSIS — M81.0 AGE-RELATED OSTEOPOROSIS WITHOUT CURRENT PATHOLOGICAL FRACTURE: ICD-10-CM

## 2025-07-01 PROCEDURE — 99215 OFFICE O/P EST HI 40 MIN: CPT | Performed by: FAMILY MEDICINE

## 2025-07-01 PROCEDURE — 3078F DIAST BP <80 MM HG: CPT | Performed by: FAMILY MEDICINE

## 2025-07-01 PROCEDURE — 1159F MED LIST DOCD IN RCRD: CPT | Performed by: FAMILY MEDICINE

## 2025-07-01 PROCEDURE — 3077F SYST BP >= 140 MM HG: CPT | Performed by: FAMILY MEDICINE

## 2025-07-01 PROCEDURE — 1036F TOBACCO NON-USER: CPT | Performed by: FAMILY MEDICINE

## 2025-07-01 PROCEDURE — 1160F RVW MEDS BY RX/DR IN RCRD: CPT | Performed by: FAMILY MEDICINE

## 2025-07-01 NOTE — PROGRESS NOTES
"  [facility]    [address]  [place] [address]    Phone: [phone]  Fax: [phone]    Progress Note        Subjective   Patient ID: Xin Fajardo \"Osiris" is a 84 y.o. female who presents for No chief complaint on file..  HPI    Review of Systems    RX Allergies[1]  Current Medications[2]  Medical History[3]  Social History[4]      Objective   There were no vitals taken for this visit.  Physical Exam    Assessment/Plan         Total time spent with patient, reviewing records, and completing charting was 30 minutes, over half of it spent counseling and/or coordinating care  **Portions of this medical record have been created using voice recognition software and may have minor errors which are inherent in voice recognition systems. It has not been fully edited for typographical or grammatical errors**           [1]   Allergies  Allergen Reactions    Amlodipine Swelling   [2]   Current Outpatient Medications:     acetaminophen (Tylenol 8 HOUR) 650 mg ER tablet, Take 1 tablet (650 mg) by mouth every 8 hours if needed (for shoulder and back pain)., Disp: , Rfl:     carvedilol (Coreg) 6.25 mg tablet, Take 2 tablets in the AM and 1 tablet in the PM, Disp: 270 tablet, Rfl: 3    cholecalciferol (Vitamin D-3) 50 mcg (2,000 unit) capsule, Take 1 capsule (2,000 Units) by mouth once daily., Disp: , Rfl:     denosumab (Prolia) 60 mg/mL syringe, Inject 1 mL (60 mg) under the skin every 6 months., Disp: , Rfl:     losartan (Cozaar) 100 mg tablet, Take 1 tablet (100 mg) by mouth once daily., Disp: 90 tablet, Rfl: 3    multivitamin-min-iron-FA-vit K (Bariatric Multivitamins) 45 mg iron- 800 mcg-120 mcg capsule, Take 1 capsule by mouth once daily., Disp: , Rfl:     mupirocin (Bactroban) 2 % ointment, apply one application to the affected area(s) 3 times a day, Disp: , Rfl:     sodium bicarbonate 650 mg tablet, Take 2 tablets twice daily, Disp: 360 tablet, Rfl: 3    thyroid, " pork, ([place] Thyroid) 90 mg tablet, Take 1 tablet (90 mg) by mouth once daily in the morning. Take before meals., Disp: , Rfl:     torsemide (Demadex) 20 mg tablet, Take 1 tablet daily as needed/directed for swelling, Disp: 90 tablet, Rfl: 1  [3]   Past Medical History:  Diagnosis Date    Essential (primary) hypertension     HTN (hypertension), benign    Personal history of other diseases of the circulatory system     History of congestive heart failure    Personal history of other diseases of the circulatory system     History of coronary artery disease    Personal history of other endocrine, nutritional and metabolic disease     History of hypothyroidism    Personal history of other malignant neoplasm of bronchus and lung     History of malignant neoplasm of lung    Personal history of other malignant neoplasm of large intestine     History of malignant neoplasm of colon   [4]   Social History  Tobacco Use    Smoking status: Former     Current packs/day: 0.00     Average packs/day: 0.5 packs/day for 10.0 years (5.0 ttl pk-yrs)     Types: Cigarettes     Start date:      Quit date:      Years since quittin.5     Passive exposure: Never    Smokeless tobacco: Never   Substance Use Topics    Alcohol use: Yes     Alcohol/week: 1.0 - 2.0 standard drink of alcohol     Types: 1 - 2 Glasses of wine per week     Comment: occationally    Drug use: Never

## 2025-07-01 NOTE — PROGRESS NOTES
"  [facility]    [address]  [place] [address]    Phone: [phone]  Fax: [phone]    Progress Note        Subjective   Patient ID: Xin Fajardo \"Osiris" is a 84 y.o. female who presents for Follow-up (3 month HTN, Thyroid ).  HPI  Prisca was seen today, daughter Tigist present, for a follow-up and review of her medications, including those for hypertension, hypothyroidism, chronic kidney disease, diastolic heart failure, osteoporosis.    Medication(s) are being taken and tolerated as prescribed, without concerns, list reconciled today.  There are no complaints of chest pain, shortness of breath, lower extremity edema, or exertional concerns.    Prisca's main concern today is that of increasingly severe and functionally limiting low back pain, due to lumbar spinal stenosis.  She takes Tylenol up to 3 times daily, does get some relief.  She has had physical therapy, epidural injections in the past, without much relief being noted.  She has not fallen, does have a cane but has not been using it.    Her right shoulder pain is stable, known end stage arthritis, reverse total shoulder replacement not an option given overall comorbidities.    Large continues to see endocrinology, who manages her osteoporosis (Prolia) and postsurgical hypothyroidism ([place] Thyroid).  Labs have been stable and reassuring.    Her care with Dr. Wright is up-to-date and ongoing.  Prisca's blood pressures tend to run high with initial check in the office, generally are improved after being seated and resting.  She takes torsemide as needed for increases in lower extremity edema, generally 2-3 times per week.  She continues sodium bicarbonate generally 3 tabs per day, occasionally 4.    Lastly, she continues to note dysphagia/difficulty swallowing, even water at times.  Since her last visit she has required the Heimlich maneuver on one occasion when trying to swallow a piece of meat.  " She has no nausea, emesis, melena, hematochezia.    Review of Systems  The full review of systems is negative with the exception of what is noted in HPI    Allergies   Allergen Reactions    Amlodipine Swelling       Current Outpatient Medications:     acetaminophen (Tylenol 8 HOUR) 650 mg ER tablet, Take 1 tablet (650 mg) by mouth every 8 hours if needed (for shoulder and back pain)., Disp: , Rfl:     carvedilol (Coreg) 6.25 mg tablet, Take 2 tablets in the AM and 1 tablet in the PM, Disp: 270 tablet, Rfl: 3    cholecalciferol (Vitamin D-3) 50 mcg (2,000 unit) capsule, Take 1 capsule (2,000 Units) by mouth once daily., Disp: , Rfl:     denosumab (Prolia) 60 mg/mL syringe, Inject 1 mL (60 mg) under the skin every 6 months., Disp: , Rfl:     losartan (Cozaar) 100 mg tablet, Take 1 tablet (100 mg) by mouth once daily., Disp: 90 tablet, Rfl: 3    multivitamin-min-iron-FA-vit K (Bariatric Multivitamins) 45 mg iron- 800 mcg-120 mcg capsule, Take 1 capsule by mouth once daily., Disp: , Rfl:     mupirocin (Bactroban) 2 % ointment, apply one application to the affected area(s) 3 times a day, Disp: , Rfl:     sodium bicarbonate 650 mg tablet, Take 2 tablets twice daily, Disp: 360 tablet, Rfl: 3    thyroid, pork, ([place] Thyroid) 90 mg tablet, Take 1 tablet (90 mg) by mouth once daily in the morning. Take before meals., Disp: , Rfl:     torsemide (Demadex) 20 mg tablet, Take 1 tablet daily as needed/directed for swelling, Disp: 90 tablet, Rfl: 1  Past Medical History:   Diagnosis Date    Essential (primary) hypertension     HTN (hypertension), benign    Personal history of other diseases of the circulatory system     History of congestive heart failure    Personal history of other diseases of the circulatory system     History of coronary artery disease    Personal history of other endocrine, nutritional and metabolic disease     History of hypothyroidism    Personal history of other malignant neoplasm of bronchus and lung      History of malignant neoplasm of lung    Personal history of other malignant neoplasm of large intestine     History of malignant neoplasm of colon     Social History     Tobacco Use    Smoking status: Former     Current packs/day: 0.00     Average packs/day: 0.5 packs/day for 10.0 years (5.0 ttl pk-yrs)     Types: Cigarettes     Start date:      Quit date:      Years since quittin.5     Passive exposure: Never    Smokeless tobacco: Never   Substance Use Topics    Alcohol use: Yes     Alcohol/week: 1.0 - 2.0 standard drink of alcohol     Types: 1 - 2 Glasses of wine per week     Comment: occationally    Drug use: Never         Objective   /56 (BP Location: Left arm, Patient Position: Sitting, BP Cuff Size: Adult)   Pulse 73   Temp 37.2 °C (98.9 °F)   Wt 46.3 kg (102 lb)   BMI 22.07 kg/m²   Physical Exam  Cardiac exam reveals a regular rate and rhythm,  murmur present, heard best at right upper sternal border.  Lungs are clear bilaterally.    1-2+ lower extremity edema noted on the left, 1+ on the right.  She is alert, oriented, very pleasant, with good insight, in no acute distress.  Right shoulder range of motion is significantly reduced in all directions.  Left lower extremity strength in all muscle groups is 5 out of 5, 4+ out of 5 on the right.    Assessment/Plan   Hypertension, much improved on second BP check, continue losartan and carvedilol.  Keep up cardiology care per Dr. Wright.  Nephrology and endocrinology care are up-to-date and ongoing.    For her lumbar spinal stenosis, which has become increasingly bothersome and functionally limiting, I recommend continuing Tylenol, and cautiously trying low-dose gabapentin.  She tolerated 100 mg twice daily well when she took it for shingles three months ago.  I recommended trying 100 mg twice daily, discussed side effects of dizziness, drowsiness, lower extremity edema.  The dose can be adjusted if needed depending on efficacy and side  effects.  Fall precautions emphasized  Recommend trying a cane    Dysphagia, recommend GI referral for an EGD and possible dilation we will facilitate.    Check labs today as ordered, including CMP, CBC, iron/TIBC.    Follow-up in roughly 3 months, sooner if needed.      Total time spent with patient, reviewing records, and completing charting was 93 minutes, over half of it spent counseling and/or coordinating care  **Portions of this medical record have been created using voice recognition software and may have minor errors which are inherent in voice recognition systems. It has not been fully edited for typographical or grammatical errors**

## 2025-07-02 LAB
ALBUMIN SERPL-MCNC: 3.5 G/DL (ref 3.6–5.1)
ALP SERPL-CCNC: 40 U/L (ref 37–153)
ALT SERPL-CCNC: 11 U/L (ref 6–29)
ANION GAP SERPL CALCULATED.4IONS-SCNC: 7 MMOL/L (CALC) (ref 7–17)
AST SERPL-CCNC: 22 U/L (ref 10–35)
BILIRUB SERPL-MCNC: 0.3 MG/DL (ref 0.2–1.2)
BUN SERPL-MCNC: 40 MG/DL (ref 7–25)
CALCIUM SERPL-MCNC: 7.9 MG/DL (ref 8.6–10.4)
CHLORIDE SERPL-SCNC: 121 MMOL/L (ref 98–110)
CO2 SERPL-SCNC: 17 MMOL/L (ref 20–32)
CREAT SERPL-MCNC: 1.62 MG/DL (ref 0.6–0.95)
EGFRCR SERPLBLD CKD-EPI 2021: 31 ML/MIN/1.73M2
ERYTHROCYTE [DISTWIDTH] IN BLOOD BY AUTOMATED COUNT: 11.8 % (ref 11–15)
GLUCOSE SERPL-MCNC: 82 MG/DL (ref 65–139)
HCT VFR BLD AUTO: 30.6 % (ref 35–45)
HGB BLD-MCNC: 9.8 G/DL (ref 11.7–15.5)
IRON SATN MFR SERPL: 32 % (CALC) (ref 16–45)
IRON SERPL-MCNC: 92 MCG/DL (ref 45–160)
MCH RBC QN AUTO: 32.9 PG (ref 27–33)
MCHC RBC AUTO-ENTMCNC: 32 G/DL (ref 32–36)
MCV RBC AUTO: 102.7 FL (ref 80–100)
PLATELET # BLD AUTO: 232 THOUSAND/UL (ref 140–400)
PMV BLD REES-ECKER: 11.4 FL (ref 7.5–12.5)
POTASSIUM SERPL-SCNC: 4.8 MMOL/L (ref 3.5–5.3)
PROT SERPL-MCNC: 5.9 G/DL (ref 6.1–8.1)
RBC # BLD AUTO: 2.98 MILLION/UL (ref 3.8–5.1)
SODIUM SERPL-SCNC: 145 MMOL/L (ref 135–146)
TIBC SERPL-MCNC: 290 MCG/DL (CALC) (ref 250–450)
WBC # BLD AUTO: 4.9 THOUSAND/UL (ref 3.8–10.8)

## 2025-07-02 RX ORDER — GABAPENTIN 100 MG/1
CAPSULE ORAL
Qty: 60 CAPSULE | Refills: 0 | Status: SHIPPED | OUTPATIENT
Start: 2025-07-02

## 2025-08-30 DIAGNOSIS — M48.061 SPINAL STENOSIS OF LUMBAR REGION, UNSPECIFIED WHETHER NEUROGENIC CLAUDICATION PRESENT: Primary | ICD-10-CM

## 2025-08-30 RX ORDER — GABAPENTIN 300 MG/1
300 CAPSULE ORAL 2 TIMES DAILY
Qty: 60 CAPSULE | Refills: 2 | Status: SHIPPED | OUTPATIENT
Start: 2025-08-30 | End: 2026-02-26

## 2025-09-29 ENCOUNTER — APPOINTMENT (OUTPATIENT)
Facility: CLINIC | Age: 85
End: 2025-09-29
Payer: MEDICARE

## 2025-10-31 ENCOUNTER — APPOINTMENT (OUTPATIENT)
Dept: CARDIOLOGY | Facility: CLINIC | Age: 85
End: 2025-10-31
Payer: MEDICARE